# Patient Record
Sex: FEMALE | Race: WHITE | NOT HISPANIC OR LATINO | Employment: OTHER | URBAN - METROPOLITAN AREA
[De-identification: names, ages, dates, MRNs, and addresses within clinical notes are randomized per-mention and may not be internally consistent; named-entity substitution may affect disease eponyms.]

---

## 2018-01-19 ENCOUNTER — OFFICE VISIT CONVERTED (OUTPATIENT)
Dept: SURGERY | Facility: CLINIC | Age: 68
End: 2018-01-19
Attending: UROLOGY

## 2018-02-23 ENCOUNTER — OFFICE VISIT CONVERTED (OUTPATIENT)
Dept: SURGERY | Facility: CLINIC | Age: 68
End: 2018-02-23
Attending: UROLOGY

## 2018-02-23 ENCOUNTER — CONVERSION ENCOUNTER (OUTPATIENT)
Dept: SURGERY | Facility: CLINIC | Age: 68
End: 2018-02-23

## 2018-06-07 ENCOUNTER — CONVERSION ENCOUNTER (OUTPATIENT)
Dept: GASTROENTEROLOGY | Facility: CLINIC | Age: 68
End: 2018-06-07

## 2018-06-07 ENCOUNTER — OFFICE VISIT CONVERTED (OUTPATIENT)
Dept: GASTROENTEROLOGY | Facility: CLINIC | Age: 68
End: 2018-06-07
Attending: NURSE PRACTITIONER

## 2018-08-06 ENCOUNTER — OFFICE VISIT CONVERTED (OUTPATIENT)
Dept: OTHER | Facility: HOSPITAL | Age: 68
End: 2018-08-06
Attending: INTERNAL MEDICINE

## 2018-10-08 ENCOUNTER — OFFICE VISIT CONVERTED (OUTPATIENT)
Dept: GASTROENTEROLOGY | Facility: CLINIC | Age: 68
End: 2018-10-08
Attending: NURSE PRACTITIONER

## 2018-12-11 ENCOUNTER — OFFICE VISIT CONVERTED (OUTPATIENT)
Dept: GASTROENTEROLOGY | Facility: CLINIC | Age: 68
End: 2018-12-11
Attending: NURSE PRACTITIONER

## 2019-03-26 ENCOUNTER — CONVERSION ENCOUNTER (OUTPATIENT)
Dept: SURGERY | Facility: CLINIC | Age: 69
End: 2019-03-26

## 2019-03-26 ENCOUNTER — HOSPITAL ENCOUNTER (OUTPATIENT)
Dept: SURGERY | Facility: CLINIC | Age: 69
Discharge: HOME | End: 2019-03-26

## 2019-03-26 ENCOUNTER — OFFICE VISIT CONVERTED (OUTPATIENT)
Dept: SURGERY | Facility: CLINIC | Age: 69
End: 2019-03-26
Attending: UROLOGY

## 2019-04-05 LAB
COLOR STONE: NORMAL
COMPN STONE: NORMAL
CONV CA OXALATE DIHYDRATE: 20 %
CONV CA OXALATE MONOHYDRATE: 60 %
CONV CALCIUM PHOSPHATE: 20 %
CONV CALCULI COMMENT: NORMAL
CONV CALCULI DISCLAIMER: NORMAL
CONV CALCULI NOTE: NORMAL
NIDUS STONE QL: NORMAL
SIZE STONE: NORMAL MM
SURFACE CRYSTALS: NORMAL
WT STONE: 23 MG

## 2019-04-08 ENCOUNTER — HOSPITAL ENCOUNTER (OUTPATIENT)
Dept: LAB | Facility: HOSPITAL | Age: 69
Discharge: HOME | End: 2019-04-08
Attending: PHYSICIAN ASSISTANT

## 2019-04-08 LAB
ALBUMIN SERPL-MCNC: 4 G/DL (ref 3.5–5)
ALBUMIN/GLOB SERPL: 1.3 {RATIO} (ref 1.4–2.6)
ALP SERPL-CCNC: 105 U/L (ref 43–160)
ALT SERPL-CCNC: 16 U/L (ref 10–40)
ANION GAP SERPL CALC-SCNC: 15 MMOL/L (ref 8–19)
AST SERPL-CCNC: 21 U/L (ref 15–50)
BASOPHILS # BLD AUTO: 0.03 10*3/UL (ref 0–0.2)
BASOPHILS NFR BLD AUTO: 0.6 % (ref 0–3)
BILIRUB SERPL-MCNC: 0.39 MG/DL (ref 0.2–1.3)
BUN SERPL-MCNC: 7 MG/DL (ref 5–25)
BUN/CREAT SERPL: 8 {RATIO} (ref 6–20)
CALCIUM SERPL-MCNC: 9.4 MG/DL (ref 8.7–10.4)
CHLORIDE SERPL-SCNC: 103 MMOL/L (ref 99–111)
CHOLEST SERPL-MCNC: 248 MG/DL (ref 107–200)
CHOLEST/HDLC SERPL: 6.5 {RATIO} (ref 3–6)
CONV ABS IMM GRAN: 0.02 10*3/UL (ref 0–0.2)
CONV CO2: 27 MMOL/L (ref 22–32)
CONV IMMATURE GRAN: 0.4 % (ref 0–1.8)
CONV TOTAL PROTEIN: 7.2 G/DL (ref 6.3–8.2)
CREAT UR-MCNC: 0.93 MG/DL (ref 0.5–0.9)
DEPRECATED RDW RBC AUTO: 50.4 FL (ref 36.4–46.3)
EOSINOPHIL # BLD AUTO: 0.24 10*3/UL (ref 0–0.7)
EOSINOPHIL # BLD AUTO: 4.5 % (ref 0–7)
ERYTHROCYTE [DISTWIDTH] IN BLOOD BY AUTOMATED COUNT: 13.5 % (ref 11.7–14.4)
GFR SERPLBLD BASED ON 1.73 SQ M-ARVRAT: >60 ML/MIN/{1.73_M2}
GLOBULIN UR ELPH-MCNC: 3.2 G/DL (ref 2–3.5)
GLUCOSE SERPL-MCNC: 100 MG/DL (ref 65–99)
HBA1C MFR BLD: 16.1 G/DL (ref 12–16)
HCT VFR BLD AUTO: 47.9 % (ref 37–47)
HDLC SERPL-MCNC: 38 MG/DL (ref 40–60)
LDLC SERPL CALC-MCNC: 161 MG/DL (ref 70–100)
LYMPHOCYTES # BLD AUTO: 1.35 10*3/UL (ref 1–5)
MCH RBC QN AUTO: 33.9 PG (ref 27–31)
MCHC RBC AUTO-ENTMCNC: 33.6 G/DL (ref 33–37)
MCV RBC AUTO: 100.8 FL (ref 81–99)
MONOCYTES # BLD AUTO: 0.32 10*3/UL (ref 0.2–1.2)
MONOCYTES NFR BLD AUTO: 5.9 % (ref 3–10)
NEUTROPHILS # BLD AUTO: 3.42 10*3/UL (ref 2–8)
NEUTROPHILS NFR BLD AUTO: 63.5 % (ref 30–85)
NRBC CBCN: 0 % (ref 0–0.7)
OSMOLALITY SERPL CALC.SUM OF ELEC: 290 MOSM/KG (ref 273–304)
PLATELET # BLD AUTO: 192 10*3/UL (ref 130–400)
PMV BLD AUTO: 10.5 FL (ref 9.4–12.3)
POTASSIUM SERPL-SCNC: 3.9 MMOL/L (ref 3.5–5.3)
RBC # BLD AUTO: 4.75 10*6/UL (ref 4.2–5.4)
SODIUM SERPL-SCNC: 141 MMOL/L (ref 135–147)
T4 FREE SERPL-MCNC: 1.3 NG/DL (ref 0.9–1.8)
TRIGL SERPL-MCNC: 244 MG/DL (ref 40–150)
TSH SERPL-ACNC: 2.36 M[IU]/L (ref 0.27–4.2)
VARIANT LYMPHS NFR BLD MANUAL: 25.1 % (ref 20–45)
VLDLC SERPL-MCNC: 49 MG/DL (ref 5–37)
WBC # BLD AUTO: 5.38 10*3/UL (ref 4.8–10.8)

## 2019-04-09 ENCOUNTER — OFFICE VISIT CONVERTED (OUTPATIENT)
Dept: GASTROENTEROLOGY | Facility: CLINIC | Age: 69
End: 2019-04-09
Attending: NURSE PRACTITIONER

## 2019-04-09 ENCOUNTER — CONVERSION ENCOUNTER (OUTPATIENT)
Dept: GASTROENTEROLOGY | Facility: CLINIC | Age: 69
End: 2019-04-09

## 2019-04-09 LAB — 25(OH)D3 SERPL-MCNC: 55.4 NG/ML (ref 30–100)

## 2019-04-24 ENCOUNTER — HOSPITAL ENCOUNTER (OUTPATIENT)
Dept: CARDIOLOGY | Facility: HOSPITAL | Age: 69
Discharge: HOME | End: 2019-04-24
Attending: PHYSICIAN ASSISTANT

## 2019-07-30 ENCOUNTER — HOSPITAL ENCOUNTER (OUTPATIENT)
Dept: GENERAL RADIOLOGY | Facility: HOSPITAL | Age: 69
Discharge: HOME | End: 2019-07-30
Attending: INTERNAL MEDICINE

## 2019-07-30 ENCOUNTER — HOSPITAL ENCOUNTER (OUTPATIENT)
Dept: OTHER | Facility: HOSPITAL | Age: 69
Discharge: HOME | End: 2019-07-30
Attending: INTERNAL MEDICINE

## 2019-07-30 LAB
BASOPHILS # BLD AUTO: 0.02 10*3/UL (ref 0–0.2)
BASOPHILS NFR BLD AUTO: 0.3 % (ref 0–3)
CONV ABS IMM GRAN: 0.01 10*3/UL (ref 0–0.2)
CONV IMMATURE GRAN: 0.2 % (ref 0–1.8)
DEPRECATED RDW RBC AUTO: 48.4 FL (ref 36.4–46.3)
EOSINOPHIL # BLD AUTO: 0.24 10*3/UL (ref 0–0.7)
EOSINOPHIL # BLD AUTO: 4.2 % (ref 0–7)
ERYTHROCYTE [DISTWIDTH] IN BLOOD BY AUTOMATED COUNT: 12.9 % (ref 11.7–14.4)
HBA1C MFR BLD: 15.4 G/DL (ref 12–16)
HCT VFR BLD AUTO: 45.8 % (ref 37–47)
LYMPHOCYTES # BLD AUTO: 1.37 10*3/UL (ref 1–5)
MCH RBC QN AUTO: 33.9 PG (ref 27–31)
MCHC RBC AUTO-ENTMCNC: 33.6 G/DL (ref 33–37)
MCV RBC AUTO: 100.9 FL (ref 81–99)
MONOCYTES # BLD AUTO: 0.41 10*3/UL (ref 0.2–1.2)
MONOCYTES NFR BLD AUTO: 7.2 % (ref 3–10)
NEUTROPHILS # BLD AUTO: 3.67 10*3/UL (ref 2–8)
NEUTROPHILS NFR BLD AUTO: 64.1 % (ref 30–85)
NRBC CBCN: 0 % (ref 0–0.7)
PLATELET # BLD AUTO: 161 10*3/UL (ref 130–400)
PMV BLD AUTO: 9.8 FL (ref 9.4–12.3)
RBC # BLD AUTO: 4.54 10*6/UL (ref 4.2–5.4)
VARIANT LYMPHS NFR BLD MANUAL: 24 % (ref 20–45)
WBC # BLD AUTO: 5.72 10*3/UL (ref 4.8–10.8)

## 2019-08-05 ENCOUNTER — OFFICE VISIT CONVERTED (OUTPATIENT)
Dept: OTHER | Facility: HOSPITAL | Age: 69
End: 2019-08-05
Attending: INTERNAL MEDICINE

## 2019-10-15 ENCOUNTER — HOSPITAL ENCOUNTER (OUTPATIENT)
Dept: LAB | Facility: HOSPITAL | Age: 69
Discharge: HOME | End: 2019-10-15
Attending: PHYSICIAN ASSISTANT

## 2019-10-15 LAB
ALBUMIN SERPL-MCNC: 4.5 G/DL (ref 3.5–5)
ALBUMIN/GLOB SERPL: 1.3 {RATIO} (ref 1.4–2.6)
ALP SERPL-CCNC: 97 U/L (ref 43–160)
ALT SERPL-CCNC: 18 U/L (ref 10–40)
ANION GAP SERPL CALC-SCNC: 18 MMOL/L (ref 8–19)
AST SERPL-CCNC: 24 U/L (ref 15–50)
BASOPHILS # BLD AUTO: 0.04 10*3/UL (ref 0–0.2)
BASOPHILS NFR BLD AUTO: 0.6 % (ref 0–3)
BILIRUB SERPL-MCNC: 0.4 MG/DL (ref 0.2–1.3)
BUN SERPL-MCNC: 14 MG/DL (ref 5–25)
BUN/CREAT SERPL: 14 {RATIO} (ref 6–20)
CALCIUM SERPL-MCNC: 10.4 MG/DL (ref 8.7–10.4)
CHLORIDE SERPL-SCNC: 99 MMOL/L (ref 99–111)
CHOLEST SERPL-MCNC: 257 MG/DL (ref 107–200)
CHOLEST/HDLC SERPL: 5.7 {RATIO} (ref 3–6)
CONV ABS IMM GRAN: 0.01 10*3/UL (ref 0–0.2)
CONV CO2: 24 MMOL/L (ref 22–32)
CONV IMMATURE GRAN: 0.2 % (ref 0–1.8)
CONV TOTAL PROTEIN: 7.9 G/DL (ref 6.3–8.2)
CREAT UR-MCNC: 0.98 MG/DL (ref 0.5–0.9)
DEPRECATED RDW RBC AUTO: 48.6 FL (ref 36.4–46.3)
EOSINOPHIL # BLD AUTO: 0.24 10*3/UL (ref 0–0.7)
EOSINOPHIL # BLD AUTO: 3.8 % (ref 0–7)
ERYTHROCYTE [DISTWIDTH] IN BLOOD BY AUTOMATED COUNT: 13 % (ref 11.7–14.4)
FOLATE SERPL-MCNC: 11.7 NG/ML (ref 4.8–20)
GFR SERPLBLD BASED ON 1.73 SQ M-ARVRAT: 59 ML/MIN/{1.73_M2}
GLOBULIN UR ELPH-MCNC: 3.4 G/DL (ref 2–3.5)
GLUCOSE SERPL-MCNC: 95 MG/DL (ref 65–99)
HCT VFR BLD AUTO: 47.8 % (ref 37–47)
HDLC SERPL-MCNC: 45 MG/DL (ref 40–60)
HGB BLD-MCNC: 16.3 G/DL (ref 12–16)
LDLC SERPL CALC-MCNC: 182 MG/DL (ref 70–100)
LYMPHOCYTES # BLD AUTO: 1.88 10*3/UL (ref 1–5)
LYMPHOCYTES NFR BLD AUTO: 29.5 % (ref 20–45)
MCH RBC QN AUTO: 34.2 PG (ref 27–31)
MCHC RBC AUTO-ENTMCNC: 34.1 G/DL (ref 33–37)
MCV RBC AUTO: 100.2 FL (ref 81–99)
MONOCYTES # BLD AUTO: 0.41 10*3/UL (ref 0.2–1.2)
MONOCYTES NFR BLD AUTO: 6.4 % (ref 3–10)
NEUTROPHILS # BLD AUTO: 3.79 10*3/UL (ref 2–8)
NEUTROPHILS NFR BLD AUTO: 59.5 % (ref 30–85)
NRBC CBCN: 0 % (ref 0–0.7)
OSMOLALITY SERPL CALC.SUM OF ELEC: 284 MOSM/KG (ref 273–304)
PLATELET # BLD AUTO: 181 10*3/UL (ref 130–400)
PMV BLD AUTO: 10.9 FL (ref 9.4–12.3)
POTASSIUM SERPL-SCNC: 4.3 MMOL/L (ref 3.5–5.3)
RBC # BLD AUTO: 4.77 10*6/UL (ref 4.2–5.4)
SODIUM SERPL-SCNC: 137 MMOL/L (ref 135–147)
T4 FREE SERPL-MCNC: 1.2 NG/DL (ref 0.9–1.8)
TRIGL SERPL-MCNC: 152 MG/DL (ref 40–150)
TSH SERPL-ACNC: 1.97 M[IU]/L (ref 0.27–4.2)
VIT B12 SERPL-MCNC: 1093 PG/ML (ref 211–911)
VLDLC SERPL-MCNC: 30 MG/DL (ref 5–37)
WBC # BLD AUTO: 6.37 10*3/UL (ref 4.8–10.8)

## 2019-10-16 LAB — 25(OH)D3 SERPL-MCNC: 84.7 NG/ML (ref 30–100)

## 2019-11-25 ENCOUNTER — HOSPITAL ENCOUNTER (OUTPATIENT)
Dept: OTHER | Facility: HOSPITAL | Age: 69
Discharge: HOME | End: 2019-11-25
Attending: INTERNAL MEDICINE

## 2019-11-25 LAB
BASOPHILS # BLD AUTO: 0.04 10*3/UL (ref 0–0.2)
BASOPHILS NFR BLD AUTO: 0.7 % (ref 0–3)
CHOLEST SERPL-MCNC: 214 MG/DL (ref 107–200)
CHOLEST/HDLC SERPL: 5.6 {RATIO} (ref 3–6)
CONV ABS IMM GRAN: 0.02 10*3/UL (ref 0–0.2)
CONV IMMATURE GRAN: 0.3 % (ref 0–1.8)
DEPRECATED RDW RBC AUTO: 48.9 FL (ref 36.4–46.3)
EOSINOPHIL # BLD AUTO: 0.47 10*3/UL (ref 0–0.7)
EOSINOPHIL # BLD AUTO: 7.8 % (ref 0–7)
ERYTHROCYTE [DISTWIDTH] IN BLOOD BY AUTOMATED COUNT: 13 % (ref 11.7–14.4)
HCT VFR BLD AUTO: 48 % (ref 37–47)
HCYS SERPL-SCNC: 13.8 UMOL/L (ref 3.7–13.9)
HDLC SERPL-MCNC: 38 MG/DL (ref 40–60)
HGB BLD-MCNC: 16 G/DL (ref 12–16)
IRON SATN MFR SERPL: 26 % (ref 20–55)
IRON SERPL-MCNC: 92 UG/DL (ref 60–170)
LDLC SERPL CALC-MCNC: 124 MG/DL (ref 70–100)
LYMPHOCYTES # BLD AUTO: 1.99 10*3/UL (ref 1–5)
LYMPHOCYTES NFR BLD AUTO: 32.8 % (ref 20–45)
MCH RBC QN AUTO: 33.8 PG (ref 27–31)
MCHC RBC AUTO-ENTMCNC: 33.3 G/DL (ref 33–37)
MCV RBC AUTO: 101.3 FL (ref 81–99)
MONOCYTES # BLD AUTO: 0.6 10*3/UL (ref 0.2–1.2)
MONOCYTES NFR BLD AUTO: 9.9 % (ref 3–10)
NEUTROPHILS # BLD AUTO: 2.94 10*3/UL (ref 2–8)
NEUTROPHILS NFR BLD AUTO: 48.5 % (ref 30–85)
NRBC CBCN: 0 % (ref 0–0.7)
PLATELET # BLD AUTO: 177 10*3/UL (ref 130–400)
PMV BLD AUTO: 10.1 FL (ref 9.4–12.3)
RBC # BLD AUTO: 4.74 10*6/UL (ref 4.2–5.4)
TIBC SERPL-MCNC: 360 UG/DL (ref 245–450)
TRANSFERRIN SERPL-MCNC: 252 MG/DL (ref 250–380)
TRIGL SERPL-MCNC: 262 MG/DL (ref 40–150)
VLDLC SERPL-MCNC: 52 MG/DL (ref 5–37)
WBC # BLD AUTO: 6.06 10*3/UL (ref 4.8–10.8)

## 2019-12-04 ENCOUNTER — OFFICE VISIT CONVERTED (OUTPATIENT)
Dept: OTHER | Facility: HOSPITAL | Age: 69
End: 2019-12-04
Attending: INTERNAL MEDICINE

## 2020-04-14 ENCOUNTER — HOSPITAL ENCOUNTER (OUTPATIENT)
Dept: OTHER | Facility: HOSPITAL | Age: 70
Discharge: HOME | End: 2020-04-14
Attending: INTERNAL MEDICINE

## 2020-04-14 ENCOUNTER — CONVERSION ENCOUNTER (OUTPATIENT)
Dept: OTHER | Facility: HOSPITAL | Age: 70
End: 2020-04-14

## 2020-04-14 ENCOUNTER — OFFICE VISIT CONVERTED (OUTPATIENT)
Dept: OTHER | Facility: HOSPITAL | Age: 70
End: 2020-04-14
Attending: INTERNAL MEDICINE

## 2020-04-14 ENCOUNTER — OFFICE VISIT CONVERTED (OUTPATIENT)
Dept: OTHER | Facility: HOSPITAL | Age: 70
End: 2020-04-14
Attending: PHYSICIAN ASSISTANT

## 2020-04-14 LAB
BASOPHILS # BLD AUTO: 0.03 10*3/UL (ref 0–0.2)
BASOPHILS NFR BLD AUTO: 0.4 % (ref 0–3)
CONV ABS IMM GRAN: 0.02 10*3/UL (ref 0–0.2)
CONV IMMATURE GRAN: 0.3 % (ref 0–1.8)
DEPRECATED RDW RBC AUTO: 50 FL (ref 36.4–46.3)
EOSINOPHIL # BLD AUTO: 0.27 10*3/UL (ref 0–0.7)
EOSINOPHIL # BLD AUTO: 3.6 % (ref 0–7)
ERYTHROCYTE [DISTWIDTH] IN BLOOD BY AUTOMATED COUNT: 13.1 % (ref 11.7–14.4)
HCT VFR BLD AUTO: 50.4 % (ref 37–47)
HGB BLD-MCNC: 16.9 G/DL (ref 12–16)
LYMPHOCYTES # BLD AUTO: 1.78 10*3/UL (ref 1–5)
LYMPHOCYTES NFR BLD AUTO: 23.8 % (ref 20–45)
MCH RBC QN AUTO: 34.3 PG (ref 27–31)
MCHC RBC AUTO-ENTMCNC: 33.5 G/DL (ref 33–37)
MCV RBC AUTO: 102.4 FL (ref 81–99)
MONOCYTES # BLD AUTO: 0.5 10*3/UL (ref 0.2–1.2)
MONOCYTES NFR BLD AUTO: 6.7 % (ref 3–10)
NEUTROPHILS # BLD AUTO: 4.88 10*3/UL (ref 2–8)
NEUTROPHILS NFR BLD AUTO: 65.2 % (ref 30–85)
NRBC CBCN: 0 % (ref 0–0.7)
PLATELET # BLD AUTO: 193 10*3/UL (ref 130–400)
PMV BLD AUTO: 10.4 FL (ref 9.4–12.3)
RBC # BLD AUTO: 4.92 10*6/UL (ref 4.2–5.4)
WBC # BLD AUTO: 7.48 10*3/UL (ref 4.8–10.8)

## 2020-04-15 LAB — SARS-COV-2 RNA SPEC QL NAA+PROBE: NOT DETECTED

## 2020-11-23 ENCOUNTER — HOSPITAL ENCOUNTER (OUTPATIENT)
Dept: LAB | Facility: HOSPITAL | Age: 70
Discharge: HOME | End: 2020-11-23
Attending: PHYSICIAN ASSISTANT

## 2020-11-23 LAB
25(OH)D3 SERPL-MCNC: 84.7 NG/ML (ref 30–100)
ALBUMIN SERPL-MCNC: 4.2 G/DL (ref 3.5–5)
ALBUMIN/GLOB SERPL: 1.3 {RATIO} (ref 1.4–2.6)
ALP SERPL-CCNC: 94 U/L (ref 43–160)
ALT SERPL-CCNC: 12 U/L (ref 10–40)
ANION GAP SERPL CALC-SCNC: 15 MMOL/L (ref 8–19)
AST SERPL-CCNC: 16 U/L (ref 15–50)
BASOPHILS # BLD AUTO: 0.02 10*3/UL (ref 0–0.2)
BASOPHILS NFR BLD AUTO: 0.3 % (ref 0–3)
BILIRUB SERPL-MCNC: 0.33 MG/DL (ref 0.2–1.3)
BUN SERPL-MCNC: 9 MG/DL (ref 5–25)
BUN/CREAT SERPL: 9 {RATIO} (ref 6–20)
CALCIUM SERPL-MCNC: 9.6 MG/DL (ref 8.7–10.4)
CHLORIDE SERPL-SCNC: 103 MMOL/L (ref 99–111)
CHOLEST SERPL-MCNC: 196 MG/DL (ref 107–200)
CHOLEST/HDLC SERPL: 5.6 {RATIO} (ref 3–6)
CONV ABS IMM GRAN: 0.01 10*3/UL (ref 0–0.2)
CONV CO2: 26 MMOL/L (ref 22–32)
CONV IMMATURE GRAN: 0.2 % (ref 0–1.8)
CONV TOTAL PROTEIN: 7.5 G/DL (ref 6.3–8.2)
CREAT UR-MCNC: 0.98 MG/DL (ref 0.5–0.9)
DEPRECATED RDW RBC AUTO: 48.5 FL (ref 36.4–46.3)
EOSINOPHIL # BLD AUTO: 0.22 10*3/UL (ref 0–0.7)
EOSINOPHIL # BLD AUTO: 3.6 % (ref 0–7)
ERYTHROCYTE [DISTWIDTH] IN BLOOD BY AUTOMATED COUNT: 13.2 % (ref 11.7–14.4)
GFR SERPLBLD BASED ON 1.73 SQ M-ARVRAT: 58 ML/MIN/{1.73_M2}
GLOBULIN UR ELPH-MCNC: 3.3 G/DL (ref 2–3.5)
GLUCOSE SERPL-MCNC: 85 MG/DL (ref 65–99)
HCT VFR BLD AUTO: 49.2 % (ref 37–47)
HDLC SERPL-MCNC: 35 MG/DL (ref 40–60)
HGB BLD-MCNC: 16.5 G/DL (ref 12–16)
IRON SERPL-MCNC: 105 UG/DL (ref 60–170)
LDLC SERPL CALC-MCNC: 105 MG/DL (ref 70–100)
LYMPHOCYTES # BLD AUTO: 1.76 10*3/UL (ref 1–5)
LYMPHOCYTES NFR BLD AUTO: 29 % (ref 20–45)
MCH RBC QN AUTO: 33.4 PG (ref 27–31)
MCHC RBC AUTO-ENTMCNC: 33.5 G/DL (ref 33–37)
MCV RBC AUTO: 99.6 FL (ref 81–99)
MONOCYTES # BLD AUTO: 0.37 10*3/UL (ref 0.2–1.2)
MONOCYTES NFR BLD AUTO: 6.1 % (ref 3–10)
NEUTROPHILS # BLD AUTO: 3.69 10*3/UL (ref 2–8)
NEUTROPHILS NFR BLD AUTO: 60.8 % (ref 30–85)
NRBC CBCN: 0 % (ref 0–0.7)
OSMOLALITY SERPL CALC.SUM OF ELEC: 288 MOSM/KG (ref 273–304)
PLATELET # BLD AUTO: 190 10*3/UL (ref 130–400)
PMV BLD AUTO: 10.3 FL (ref 9.4–12.3)
POTASSIUM SERPL-SCNC: 3.7 MMOL/L (ref 3.5–5.3)
RBC # BLD AUTO: 4.94 10*6/UL (ref 4.2–5.4)
SODIUM SERPL-SCNC: 140 MMOL/L (ref 135–147)
T4 FREE SERPL-MCNC: 1.1 NG/DL (ref 0.9–1.8)
TRIGL SERPL-MCNC: 279 MG/DL (ref 40–150)
TSH SERPL-ACNC: 2.97 M[IU]/L (ref 0.27–4.2)
VIT B12 SERPL-MCNC: 1263 PG/ML (ref 211–911)
VLDLC SERPL-MCNC: 56 MG/DL (ref 5–37)
WBC # BLD AUTO: 6.07 10*3/UL (ref 4.8–10.8)

## 2020-11-23 PROCEDURE — 82746 ASSAY OF FOLIC ACID SERUM: CPT

## 2020-11-26 ENCOUNTER — LAB REQUISITION (OUTPATIENT)
Dept: LAB | Facility: HOSPITAL | Age: 70
End: 2020-11-26

## 2020-11-26 DIAGNOSIS — Z00.00 ROUTINE GENERAL MEDICAL EXAMINATION AT A HEALTH CARE FACILITY: ICD-10-CM

## 2020-11-26 LAB — FOLATE SERPL-MCNC: 6.94 NG/ML (ref 4.78–24.2)

## 2020-12-22 ENCOUNTER — OFFICE VISIT CONVERTED (OUTPATIENT)
Dept: ONCOLOGY | Facility: HOSPITAL | Age: 70
End: 2020-12-22
Attending: INTERNAL MEDICINE

## 2020-12-22 ENCOUNTER — HOSPITAL ENCOUNTER (OUTPATIENT)
Dept: OTHER | Facility: HOSPITAL | Age: 70
Discharge: HOME | End: 2020-12-22
Attending: INTERNAL MEDICINE

## 2020-12-28 ENCOUNTER — HOSPITAL ENCOUNTER (OUTPATIENT)
Dept: GENERAL RADIOLOGY | Facility: HOSPITAL | Age: 70
Discharge: HOME | End: 2020-12-28
Attending: PHYSICIAN ASSISTANT

## 2021-02-24 ENCOUNTER — HOSPITAL ENCOUNTER (OUTPATIENT)
Dept: LAB | Facility: HOSPITAL | Age: 71
Discharge: HOME | End: 2021-02-24
Attending: PHYSICIAN ASSISTANT

## 2021-02-24 LAB
ALBUMIN SERPL-MCNC: 4.1 G/DL (ref 3.5–5)
ALBUMIN/GLOB SERPL: 1.3 {RATIO} (ref 1.4–2.6)
ALP SERPL-CCNC: 83 U/L (ref 43–160)
ALT SERPL-CCNC: 19 U/L (ref 10–40)
ANION GAP SERPL CALC-SCNC: 15 MMOL/L (ref 8–19)
AST SERPL-CCNC: 23 U/L (ref 15–50)
BASOPHILS # BLD AUTO: 0.03 10*3/UL (ref 0–0.2)
BASOPHILS NFR BLD AUTO: 0.6 % (ref 0–3)
BILIRUB SERPL-MCNC: 0.3 MG/DL (ref 0.2–1.3)
BUN SERPL-MCNC: 11 MG/DL (ref 5–25)
BUN/CREAT SERPL: 11 {RATIO} (ref 6–20)
CALCIUM SERPL-MCNC: 9.8 MG/DL (ref 8.7–10.4)
CHLORIDE SERPL-SCNC: 105 MMOL/L (ref 99–111)
CHOLEST SERPL-MCNC: 208 MG/DL (ref 107–200)
CHOLEST/HDLC SERPL: 5.2 {RATIO} (ref 3–6)
CONV ABS IMM GRAN: 0.01 10*3/UL (ref 0–0.2)
CONV CO2: 24 MMOL/L (ref 22–32)
CONV IMMATURE GRAN: 0.2 % (ref 0–1.8)
CONV TOTAL PROTEIN: 7.2 G/DL (ref 6.3–8.2)
CREAT UR-MCNC: 0.96 MG/DL (ref 0.5–0.9)
DEPRECATED RDW RBC AUTO: 48.4 FL (ref 36.4–46.3)
EOSINOPHIL # BLD AUTO: 0.32 10*3/UL (ref 0–0.7)
EOSINOPHIL # BLD AUTO: 5.9 % (ref 0–7)
ERYTHROCYTE [DISTWIDTH] IN BLOOD BY AUTOMATED COUNT: 13 % (ref 11.7–14.4)
GFR SERPLBLD BASED ON 1.73 SQ M-ARVRAT: 59 ML/MIN/{1.73_M2}
GLOBULIN UR ELPH-MCNC: 3.1 G/DL (ref 2–3.5)
GLUCOSE SERPL-MCNC: 77 MG/DL (ref 65–99)
HCT VFR BLD AUTO: 46.5 % (ref 37–47)
HDLC SERPL-MCNC: 40 MG/DL (ref 40–60)
HGB BLD-MCNC: 15.8 G/DL (ref 12–16)
LDLC SERPL CALC-MCNC: 133 MG/DL (ref 70–100)
LYMPHOCYTES # BLD AUTO: 1.33 10*3/UL (ref 1–5)
LYMPHOCYTES NFR BLD AUTO: 24.4 % (ref 20–45)
MCH RBC QN AUTO: 34.1 PG (ref 27–31)
MCHC RBC AUTO-ENTMCNC: 34 G/DL (ref 33–37)
MCV RBC AUTO: 100.4 FL (ref 81–99)
MONOCYTES # BLD AUTO: 0.48 10*3/UL (ref 0.2–1.2)
MONOCYTES NFR BLD AUTO: 8.8 % (ref 3–10)
NEUTROPHILS # BLD AUTO: 3.28 10*3/UL (ref 2–8)
NEUTROPHILS NFR BLD AUTO: 60.1 % (ref 30–85)
NRBC CBCN: 0 % (ref 0–0.7)
OSMOLALITY SERPL CALC.SUM OF ELEC: 288 MOSM/KG (ref 273–304)
PLATELET # BLD AUTO: 191 10*3/UL (ref 130–400)
PMV BLD AUTO: 10.6 FL (ref 9.4–12.3)
POTASSIUM SERPL-SCNC: 4 MMOL/L (ref 3.5–5.3)
RBC # BLD AUTO: 4.63 10*6/UL (ref 4.2–5.4)
SODIUM SERPL-SCNC: 140 MMOL/L (ref 135–147)
TRIGL SERPL-MCNC: 175 MG/DL (ref 40–150)
VLDLC SERPL-MCNC: 35 MG/DL (ref 5–37)
WBC # BLD AUTO: 5.45 10*3/UL (ref 4.8–10.8)

## 2021-05-10 NOTE — H&P
History and Physical      Patient Name: Samira Kramer   Patient ID: [id number]   Sex: Female   YOB: 1950    Primary Care Provider: Alysa Atkins PA-C   Referring Provider: Alysa Atkins PA-C    Visit Date: April 14, 2020    Provider: Rose Manzano PA-C   Location[facility]   Location Address: [address] [place] [address]  [id number]   Location Phone: [phone]          Chief Complaint  · Evaluation for Respiratory Virus      History Of Present Illness  Samira Kramer is a 69 year old /White female who presents today to the clinic for evaluation of a respiratory virus.   Date of Onset: 02/24/2020   What Symptoms: cough   Quality of Symptoms: Presents today with a cough since the end of February. Symptoms dry nonproductive sometimes productive with yellow phlegm. She is also with subjective fevers at the beginning of this and has been occurring intermittently throughout. She is afebrile today upon presentation.   Anything make it worse or better: nothing   Severity of Symptoms: mild   Any known Exposure to COVID-19: no known exposure       Past Medical History  Acid reflux; Diarrhea; High cholesterol         Past Surgical History  Appendectomy; Colonoscopy; Tubal ligation         Medication List  garlic 1,000 mg oral capsule; Green Tea oral capsule; levothyroxine 25 mcg oral tablet; potassium 99 mg oral tablet; Probiotic 15 billion cell oral capsule; vitamin B12-folic acid 500-400 mcg oral tablet; Vitamin C 1,000 mg oral tablet; Vitamin D3 1,000 unit oral capsule         Allergy List  Aleve; PENICILLINS       Allergies Reconciled  Family Medical History  *No Known Family History         Social History  Alcohol (Unknown); Tobacco (Current every day)         Review of Systems  · Constitutional  o Denies  o : fatigue, fever, weight gain, weight loss  · Respiratory  o Admits  o : cough  o Denies  o :  asthma  · Gastrointestinal  o Denies  o : nausea, vomiting, diarrhea, constipation, abdominal pain  · Integument  o Denies  o : rash  · Neurologic  o Denies  o : headache      Vitals  Date Time BP Position Site L\R Cuff Size HR RR TEMP (F) WT  HT  BMI kg/m2 BSA m2 O2 Sat HC       04/14/2020 01:05 PM      84 - R  97.7     99 %          Physical Examination  · Constitutional  o Appearance  o : no acute distress, well-nourished  · Head and Face  o Head  o :   § Inspection  § : atraumatic, normocephalic  · Eyes  o Eyes  o : extraocular movements intact, no scleral icterus, no conjunctival injection  · Ears, Nose, Mouth and Throat  o Ears  o :   § External Ears  § : normal  § Otoscopic Examination  § : normal tympanic membrane exam  o Nose  o :   § Intranasal Exam  § : sinuses nontender to palpation  o Oral Cavity  o :   § Oral Mucosa  § : moist mucous membranes  o Throat  o :   § Oropharynx  § : oropharynx inflammation present   · Respiratory  o Respiratory Effort  o : breathing comfortably, symmetric chest rise  o Auscultation of Lungs  o : clear to asculatation bilaterally, no wheezes, rales, or rhonchii  · Cardiovascular  o Heart  o :   § Auscultation of Heart  § : regular rate and rhythm, no murmurs, rubs, or gallops  o Peripheral Vascular System  o :   § Extremities  § : no edema  · Lymphatic  o Neck  o : no lymphadenopathy present  o Supraclavicular Nodes  o : no supraclavicular nodes  · Skin and Subcutaneous Tissue  o General Inspection  o : normal inspection  · Neurologic  o Mental Status Examination  o :   § Orientation  § : grossly oriented to person, place and time  o Gait and Station  o :   § Gait Screening  § : normal gait  · Psychiatric  o General  o : normal mood and affect          Results  · In-Office Procedures  o Lab procedure  § Rapid group A Streptococcus screen (30689)   § Beta Strep Gp A Culture: Negative   § Internal Control Verified?: Yes        Assessment  · Cough     786.2/R05  · Fever     780.60/R50.9  · Sore throat     462/J02.9  · Candidiasis of mouth     112.0/B37.0      Plan  · Orders  o Chest (AP PA and Lateral) 2 views X-Ray [facility] (45165) - 786.2/R05, 780.60/R50.9, 462/J02.9 - 04/14/2020  · Medications  o clindamycin HCl 300 mg oral capsule   SIG: take 1 capsule (300 mg) by oral route 2 times per day for 10 days   DISP: (20) capsules with 0 refills  Prescribed on 04/14/2020     o nystatin 100,000 unit/mL oral suspension   SIG: take 4 milliliters (400,000 unit) by oral route 4 times per day for 10 days   DISP: (160) milliliters with 0 refills  Prescribed on 04/14/2020     o Medications have been Reconciled  o Transition of Care or Provider Policy  · Instructions  o Plan of Care:   o Patient instructed to seek medical attention urgently for new or worsening symptoms.  o Patient was educated on their diagnosis, treatment, and medications today.   o Recommend self monitoring. Instructions given.   o Stay home until without fever for 72 hours without using fever-reducing medications and other symptoms are gone.  o Recommends over the counter medications for symptom management.  o Chest xray done. Covid swab performed. Strep test done as well. Patient will be treated for Strep. Patient will be treated for thrush. Patient will be notified of all labs.   · Disposition  o Call or Return if symptoms worsen or persist.  · Correspondence  o CC this document (Alysa Atkins PA-C, Mia Woods MD) - 04/14/2020            Electronically Signed by: Rose Manzano PA-C -Author on April 14, 2020 02:00:39 PM

## 2021-05-15 VITALS — WEIGHT: 130 LBS | RESPIRATION RATE: 12 BRPM | HEIGHT: 62 IN | BODY MASS INDEX: 23.92 KG/M2

## 2021-05-15 VITALS
WEIGHT: 131.5 LBS | DIASTOLIC BLOOD PRESSURE: 82 MMHG | SYSTOLIC BLOOD PRESSURE: 141 MMHG | BODY MASS INDEX: 24.2 KG/M2 | HEIGHT: 62 IN

## 2021-05-15 VITALS
WEIGHT: 131 LBS | DIASTOLIC BLOOD PRESSURE: 73 MMHG | BODY MASS INDEX: 24.11 KG/M2 | HEIGHT: 62 IN | SYSTOLIC BLOOD PRESSURE: 145 MMHG

## 2021-05-15 VITALS — HEART RATE: 84 BPM | TEMPERATURE: 97.7 F | OXYGEN SATURATION: 99 %

## 2021-05-16 VITALS
BODY MASS INDEX: 23.81 KG/M2 | SYSTOLIC BLOOD PRESSURE: 114 MMHG | HEIGHT: 62 IN | WEIGHT: 129.37 LBS | DIASTOLIC BLOOD PRESSURE: 74 MMHG

## 2021-05-16 VITALS
WEIGHT: 135 LBS | BODY MASS INDEX: 24.84 KG/M2 | HEIGHT: 62 IN | DIASTOLIC BLOOD PRESSURE: 68 MMHG | SYSTOLIC BLOOD PRESSURE: 120 MMHG

## 2021-05-16 VITALS
WEIGHT: 130.12 LBS | SYSTOLIC BLOOD PRESSURE: 122 MMHG | BODY MASS INDEX: 23.94 KG/M2 | HEIGHT: 62 IN | DIASTOLIC BLOOD PRESSURE: 70 MMHG

## 2021-05-16 VITALS — RESPIRATION RATE: 16 BRPM | HEIGHT: 62 IN | BODY MASS INDEX: 24.84 KG/M2 | WEIGHT: 135 LBS

## 2021-05-20 ENCOUNTER — HOSPITAL ENCOUNTER (OUTPATIENT)
Dept: CARDIOLOGY | Facility: HOSPITAL | Age: 71
Discharge: HOME | End: 2021-05-20
Attending: PHYSICIAN ASSISTANT

## 2021-05-28 VITALS
WEIGHT: 131.4 LBS | OXYGEN SATURATION: 95 % | SYSTOLIC BLOOD PRESSURE: 140 MMHG | HEART RATE: 79 BPM | TEMPERATURE: 98.3 F | OXYGEN SATURATION: 100 % | HEIGHT: 62 IN | WEIGHT: 131.4 LBS | RESPIRATION RATE: 20 BRPM | SYSTOLIC BLOOD PRESSURE: 117 MMHG | TEMPERATURE: 98.2 F | HEIGHT: 62 IN | OXYGEN SATURATION: 98 % | DIASTOLIC BLOOD PRESSURE: 78 MMHG | DIASTOLIC BLOOD PRESSURE: 69 MMHG | RESPIRATION RATE: 18 BRPM | BODY MASS INDEX: 23.46 KG/M2 | TEMPERATURE: 97.6 F | WEIGHT: 127.5 LBS | WEIGHT: 129.37 LBS | HEART RATE: 66 BPM | SYSTOLIC BLOOD PRESSURE: 112 MMHG | BODY MASS INDEX: 24.18 KG/M2 | BODY MASS INDEX: 24.18 KG/M2 | SYSTOLIC BLOOD PRESSURE: 143 MMHG | BODY MASS INDEX: 23.81 KG/M2 | RESPIRATION RATE: 12 BRPM | OXYGEN SATURATION: 99 % | DIASTOLIC BLOOD PRESSURE: 70 MMHG | HEART RATE: 72 BPM | HEIGHT: 62 IN | TEMPERATURE: 98.3 F | HEIGHT: 62 IN | HEART RATE: 73 BPM | DIASTOLIC BLOOD PRESSURE: 70 MMHG

## 2021-05-28 VITALS
RESPIRATION RATE: 18 BRPM | TEMPERATURE: 97.9 F | DIASTOLIC BLOOD PRESSURE: 80 MMHG | OXYGEN SATURATION: 99 % | HEART RATE: 83 BPM | SYSTOLIC BLOOD PRESSURE: 108 MMHG

## 2021-05-28 NOTE — PROGRESS NOTES
Patient: YAZMIN FARMER     Acct: [id number]     Report: #[id number]  UNIT #: [id number]     : 1950    Encounter Date:2020  PRIMARY CARE: Alysa Atkins  ***Signed***  --------------------------------------------------------------------------------------------------------------------  NURSE INTAKE      Visit Type      New Patient Visit            Chief Complaint      ELEVATED HGB            Referring Provider/Copies To      Primary Care Provider:  Alysa Atkins      Copies To:   Alysa Atkins            History and Present Illness      HPI - Hematology Interim      Hematology/oncology problem list:      ------------------------------------------------            1.  History of secondary erythrocytosis: Current hemoglobin ranges between     16-16.5            Mrs. Swain will return to the cancer blood center today for her annual follow-    up evaluation to review her CBC.  Her hemoglobin is 16.5 unchanged from her     prior values.  She continues to smoke about 1 pack of cigarettes a day.  She     denies any new symptoms.  She denies any shortness of breath, pruritus, bleeding    or clotting in the interim.            PAST, FAMILY   Past Medical History      Past Medical History:  High Cholesterol, Thyroid Disease      Other PMH:        ELEVATED HGB            Past Surgical History      Appendectomy, Cataract Surgery            Family History      Family History:  No Family History            Social History      Marital Status:        Lives independently:  Yes      Number of Children:  0            Tobacco Use      Tobacco status:  Current every day smoker      Smoking packs/day:  1            Alcohol Use      Alcohol intake:  None            Substance Use      Substance use:  Denies use            REVIEW OF SYSTEMS      General:  Denies: Appetite Change, Fatigue, Fever, Night Sweats, Weight Gain,     Weight Loss      Eye:  Denies Blurred Vision, Denies Corrective Lenses,  Denies Diplopia, Denies     Vision Changes      ENT:  Denies Headache, Denies Hearing Loss, Denies Hoarseness, Denies Sore     Throat      Cardiovascular:  Denies Chest Pain, Denies Palpitations      Respiratory:  Denies: Cough, Coughing Blood, Productive Cough, Shortness of Air,    Wheezing      Gastrointestinal:  Denies Bloody Stools, Denies Constipation, Denies Diarrhea,     Denies Nausea/Vomiting, Denies Problem Swallowing, Denies Unable to Control     Bowels      Genitourinary:  Denies Blood in Urine, Denies Incontinence, Denies Painful     Urination      Musculoskeletal:  Denies Back Pain, Denies Muscle Pain, Denies Painful Joints      Integumentary:  Denies Itching, Denies Lesions, Denies Rash      Neurologic:  Denies Dizziness, Denies Numbness\Tingling, Denies Seizures      Psychiatric:  Denies Anxiety, Denies Depression      Endocrine:  Denies Cold Intolerance, Denies Heat Intolerance      Hematologic/Lymphatic:  Denies Bruising, Denies Bleeding, Denies Enlarged Lymph     Nodes      Reproductive:  Denies: Menopause, Heavy Periods, Pregnant, Still Menstruating            VITAL SIGNS AND SCORES      Vitals      Temperature 97.9 F / 36.61 C - Temporal      Pulse 83      Respirations 18      Blood Pressure 108/80 Sitting, Right Arm      Pulse Oximetry 99%, ROOM AIR            Pain Score      Experiencing any pain?:  No      Pain Scale Used:  Numerical      Pain Intensity:  0            Fatigue Score      Experiencing any fatigue?:  No      Fatigue (0-10 scale):  0 (none)            EXAM      General Appearance:  Positive for: Alert, Oriented x3, Cooperative      Respiratory:  Positive for: CTAB      Abdomen/Gastro:  Positive for: Normal Active Bowel Sounds, Soft      Cardiovascular:  Positive for: RRR      Psychiatric:  Positive for: AAO X 3      Neurologic:  Positive for: Cranial Ner II-XII Intact      Lymphatic:  Negative for: Axillary, Cervical, Epitrochlear, Femoral,     Infraclavicular, Inguinal,  Occipital, Popliteal, Posterior auricular,     Preauricular, Supraclavicular            PREVENTION      Date Influenza Vaccine Given:  Nov 11, 2020      Influenza Vaccine Declined:  No      2 or More Falls in Past Year?:  No      Fall Past Year with Injury?:  No      Hx Pneumococcal Vaccination:  Yes      Encouraged to follow-up with:  PCP regarding preventative exams.      Chart initiated by      DOTTIE ARIZMENDI            ALLERGY/MEDS      Allergies      Coded Allergies:             PENICILLINS (Verified  Allergy, Severe, HIVES, 12/22/20)           NAPROXEN (Verified  Adverse Reaction, Unknown, Mouth sores, 12/22/20)            Medications      Last Reconciled on 4/14/20 13:31 by SEAN TROY MD      Zinc Gluconate (Zinc, Elemental) 30 Mg Tab      2 TAB PO QDAY, TAB         Reported         4/14/20       (Vitamin D)   No Conflict Check      1000 MG PO QDAY         Reported         12/4/19       Acetaminophen Es (Tylenol Extra Strength) 500 Mg Tablet      500 MG PO QID PRN for PAIN OR FEVER, #100 TAB 0 Refills         Reported         8/5/19       Cyanocobalamin (Vitamin B-12*) 1,000 Mcg Tablet.er      1000 MCG PO QDAY, #30 TAB.ER         Reported         8/6/18       Levothyroxine (Levothyroxine) 0.025 Mg Tablet      0.025 MG PO QDAY@07, #30 TAB 0 Refills         Reported         8/6/18       Melatonin (Melatonin) 5 Mg Tablet      5 MG PO HS, TAB         Reported         7/13/17       Ascorbic Acid (Vitamin C*) 1,000 Mg Tablet      1000 MG PO QDAY, TAB         Reported         1/6/17       Garlic (Garlic Oil) 1,000 Mg Capsule      2000 MG PO BID, CAP         Reported         1/6/17      Medications Reviewed:  Changes made to meds            IMPRESSION/PLAN      Impression      1.  History of secondary erythrocytosis: Current hemoglobin ranges between     16-16.5            Diagnosis      Notes      Discontinued Medications      * Azithromycin Z-Landon (Zithromax Z-Landon) 250 MG TABLET: 250 MG PO ASDIR #1          Instructions: Take 500 mg (two tablets) by mouth the first day, then 250 mg        (one tablet) daily until all taken.      * Ezetimibe (Zetia) 10 MG TABLET: 10 MG PO QDAY 30 Days #30            Plan      1.  We will continue to follow-up annually without any further investigation for    now            Patient Education      Patient Education Provided:  Yes            Electronically signed by Behairy,Ahmed S  12/22/2020 14:59       Disclaimer: Converted document may not contain table formatting or lab diagrams. Please see [employer] for the authenticated document.

## 2021-05-28 NOTE — PROGRESS NOTES
Patient: YAZMIN FARMER     Acct: [id number]     Report: #[id number]  UNIT #: [id number]     : 1950    Encounter Date:2020  PRIMARY CARE: Alysa Atkins  ***Signed***  --------------------------------------------------------------------------------------------------------------------  Progress Note      DATE: 20      Primary Care Provider:  Alysa Atkins      Referring Provider:  Juan Melton      Chief Complaint      [facility] PT Requested for cough/headcold            Subjective      69-year-old white female consult requesting to be seen because she could not see    her family care provider.  This is during the time of the pandemic.  Patient     claims that she has had a cough for the past 2 months which she does not attrib    sandra to the coronavirus but to her allergies.  She has sputum productive of     yellow expectoration which cleared up for a month and recurred.  Her cough is     usually at night and in the morning when she wakes up.  Her phlegm apparently is    getting clear.  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  She denies any fever nor shortness of breath.            Patient continues to smoke.  She also has a noncalcified nodule in her lung.            Patient requests refill of her Zetia.  Her doctor's office is closed.            Past Med/Surg History            Past Med/Surg History:   No Hypertension             No Diabetes Mellitus             No Heart Disease             No Cancer             Other (hyperlipidemia, hypothyroidism)            Social History      Social History:  Tobacco Use (34 pack years); No Alcohol Use, No Recreational     Drug use            Allergies      Coded Allergies:             PENICILLINS (Verified  Allergy, Severe, HIVES, 18)           NAPROXEN (Verified  Adverse Reaction, Unknown, Mouth sores, 18)            Medications      Medications    Last Reconciled on 20 13:31 by SEAN TROY MD       Ezetimide (Zetia) 10 Mg Tablet      10 MG PO QDAY for 30 Days, #30 TAB 0 Refills         Prov: VezaMia         4/14/20       Azithromycin Z-Landon (Zithromax Z-Landon) 250 Mg Tablet      250 MG PO ASDIR, #1 PACKET         Prov: Veza,Pinal         4/14/20       Zinc Gluconate (Zinc, Elemental) 30 Mg Tab      2 TAB PO QDAY, TAB         Reported         4/14/20       Ezetimide (Zetia) 10 Mg Tablet      10 MG PO QDAY, #30 TAB 0 Refills         Reported         12/4/19       (Vitamin D)   No Conflict Check      1000 MG PO QDAY         Reported         12/4/19       Acetaminophen Es (Tylenol Extra Strength) 500 Mg Tablet      500 MG PO QID PRN for PAIN OR FEVER, #100 TAB 0 Refills         Reported         8/5/19       Psyllium Seed (With Sugar) (Metamucil) 575 Gm Powder      1 TBSP PO QDAY, #1 BOTTLE         Reported         8/5/19       Cyanocobalamin (Vitamin B-12*) 1,000 Mcg Tablet.er      1000 MCG PO QDAY, #30 TAB.ER         Reported         8/6/18       Halobetasol Propionate 0.05% Oint (Halobetasol Propionate 0.05% Oint) 50 Gm     Oint...g.      1 APL TOPICAL BID PRN for ITCHING, TUBE         Reported         8/6/18       Levothyroxine (Levothyroxine) 0.025 Mg Tablet      0.025 MG PO QDAY@07, #30 TAB 0 Refills         Reported         8/6/18       Melatonin (Melatonin) 5 Mg Tablet      5 MG PO HS, TAB         Reported         7/13/17       Ascorbic Acid (Vitamin C*) 1,000 Mg Tablet      1000 MG PO QDAY, TAB         Reported         1/6/17       Garlic (Garlic Oil) 1,000 Mg Capsule      2000 MG PO BID, CAP         Reported         1/6/17      Current Medications      Current Medications Reviewed 4/14/20            Pain Assessment      Pain Intensity:  0      Description:  None            Review of Systems      General:  Anxiety, Fatigue Scale: (4), Pain Scale: (0)      HEENT:  No Dysphagia      Respiratory:  Cough; No Shortness of Air; Sputum Changes (Yellow/clear),     Wheezing      Cardiovascular:  No Chest Pain       Gastrointestinal:  No Nausea, No Vomiting; Appetite Fair (Fair)      Genitourinary:  No Nocturia      Musculoskeletal:  No Joint Effusions      Endocrine:  No Heat Intolerance      Hematologic:  No Bleeding      Allergic/Immunologic:  No Hives      Psychological:  Anxiety; No Depression      Neurological:  Headaches; No Dizziness      Skin:  No Rash, No Open Wounds      Review of Systems      PT reports having headcold for two months            Vitals      Height 5 ft 2 in / 157.48 cm      Weight 131 lbs 6.4 oz / 59.632764 kg      BSA 1.60 m2      BMI 24.0 kg/m2      Temperature 98.2 F / 36.78 C      Pulse 79      Respirations 12      Blood Pressure 140/78      Pulse Oximetry 98%            Exam      Constitutional:  No acute distress, Conversant, Pleasant      Eyes:  Anicteric sclerae, Palpebral Conjunctivae (Pink), CALDERON      Neck:  Supple, Full Range of Motion      Cardiovascular:  RRR; No Murmurs; Normal PMI; No Peripheral Edema      Lungs:  Clear to Ausculation, Normal Respiratory Effort; No Rhonchi; Crackles     (Bibasilar); No Wheezes, No Tachypnea      Abdomen:  Soft; No Tenderness      Chest:  Other (Symmetrical)      Lymphatic:  No Neck      Extremities:  No digital cyanosis, No digital ischemia, Normal gait, Other (No     deformity)      Neurological:  Cranial Nerve II-XII (Intact); No Focal Sensory deficits      Psychological:  Appropriate affect, Appropriate mood, Intact judgement, Alert      Skin:  Other (No dermatoses)            Impression/Problem List      Cough productive of yellowish expectoration with no accompanying shortness of     breath nor fever      Erythrocytosis, secondary -  patient claims that it is probably worse this time     because she has not been drinking a lot of water like she did last summer      Macrocytosis       Hyperlipidemia      Gastroesophageal reflux disease      Hematuria, history of      History of gout      Nicotine dependence      Right upper lobe nodule last  CAT scan was July 30, 2019      Vitamin D deficiency      Hypothyroidism      Cough present for greater than 3 weeks - R05            Notes      New Medications      * Zinc Gluconate (Zinc, Elemental) 30 MG TAB: 2 TAB PO QDAY         Instructions: 1 TAB      * Azithromycin Z-Landon (Zithromax Z-Landon) 250 MG TABLET: 250 MG PO ASDIR #1         Instructions: Take 500 mg (two tablets) by mouth the first day, then 250 mg        (one tablet) daily until all taken.      * Ezetimide (Zetia) 10 MG TABLET: 10 MG PO QDAY 30 Days #30      New Diagnostics      * CBC With Auto Diff, Stat         Dx: Erythrocytosis - D75.1      Problems:        (1) Cough present for greater than 3 weeks      (2) Erythrocytosis      (3) Hyperlipidemia      Qualifiers:           Qualified Codes:  E78.5 - Hyperlipidemia, unspecified      (4) Hypothyroidism, unspecified      (5) Macrocytosis without anemia            Plan      CBC today      Chest x-ray PA and lateral      Covid testing -patient referred to  [facility]      Z-Landon      Refill Zetia for 1 month only      Follow-up in 6 months for erythrocytosis            Patient Education:        Cough            PREVENTION      Hx Influenza Vaccination:  Yes      Date Influenza Vaccine Given:  Nov 11, 2019      Influenza Vaccine Declined:  No      2 or More Falls Past Year?:  No      Fall Past Year with Injury?:  No      Encouraged to follow-up with:  PCP regarding preventative exams.      Chart initiated by      SU Membreno MA            Electronically signed by Mia Woods  04/14/2020 13:31       Disclaimer: Converted document may not contain table formatting or lab diagrams. Please see [employer] for the authenticated document.

## 2021-05-28 NOTE — PROGRESS NOTES
Patient: YAZMIN FARMER     Acct: [id number]     Report: #[id number]  UNIT #: [id number]     : 1950    Encounter Date:2018  PRIMARY CARE: Alysa Atkins  ***Signed***  --------------------------------------------------------------------------------------------------------------------  Progress Note      DATE: 18      Primary Care Provider:  Alysa Atkins      Referring Provider:  Juan Melton      Chief Complaint      Follow Elevated H and H            Subjective      67-year-old white female has a history of erythrocytosis.  This was felt to be     secondary to her chronic cigarette smoking.  Patient also has a history of lung     nodules that has been followed up with CAT scans.            Patient claims that she started taking levothyroxine about 2-3 months ago.  She     was complaining of prolonged fatigue after torso work.  After starting on the     levothyroxine 25  g per day patient had more energy.            Past Med/Surg History            Past Med/Surg History:   No Hypertension             No Diabetes Mellitus             No Heart Disease             No Cancer             Other (hyperlipidemia)            Social History      Social History:  Tobacco Use (34 pack years), No Alcohol Use, No Recreational     Drug use            Allergies      Coded Allergies:             PENICILLINS (Verified  Allergy, Severe, HIVES, 18)           NAPROXEN (Verified  Adverse Reaction, Unknown, Mouth sores, 18)            Medications      Medications    Last Reconciled on 18 13:39 by SEAN TROY MD      Lactobac Cmb #3/Fos/Pantethine (Probiotic   1 EACH PO, CAP         Reported         18       Dicyclomine Hcl (Dicyclomine*) 10 Mg Capsule      10 MG PO ACHS, CAP         Reported         18       diphenhydrAMINE HCl (Q-Dryl) 25 Mg Cap      25 MG PO HS, CAP         Reported         18       Cyanocobalamin (Vitamin B-12*) 1,000 Mcg Tablet.er      1000 MCG PO QDAY, #30  TAB.ER         Reported         8/6/18       Halobetasol Propionate (Ultravate 0.05%) 50 Gm Oint...g.      1 APL TOPICAL BID, TUBE         Reported         8/6/18       Levothyroxine (Levothyroxine) 0.025 Mg Tablet      0.025 MG PO QDAY@07, #30 TAB 0 Refills         Reported         8/6/18       Colestipol HCl (Colestipol HCl) 1 Gm Tablet      1 GM PO QDAY for 30 Days, #30 TAB         Reported         8/6/18       Loperamide HCl (Imodium) 2 Mg Capsule      4 MG PO ASDIR Y for DIARRHEA, CAP         Reported         2/5/18       Melatonin (Melatonin) 5 Mg Tablet      5 MG PO HS, TAB         Reported         7/13/17       Ascorbic Acid (Vitamin C*) 1,000 Mg Tablet      1000 MG PO QDAY, TAB         Reported         1/6/17       Garlic (Garlic Oil) 1,000 Mg Capsule      2000 MG PO BID, CAP         Reported         1/6/17      Current Medications      Current Medications Reviewed 8/6/18            Pain Assessment      Description:  None            Review of Systems      General:  No Anxiety, No Pain Scale:, No Fever, No Other      HEENT:  No Dysphagia, No Hearing Changes, No Rhinorrhea, No Tinnitus, No Visual     Changes, No Nasal Congestion, No Epistaxis, No Other      Respiratory:  No Cough, No Shortness of Air, No Sputum Changes, No Wheezing, No     Hemoptysis, No Congestion, No Other      Cardiovascular:  No Chest Pain, No Pedal Edema, No Orthopnea, No Palpitations,     No Chest Pressure, No Dizziness, No Other      Gastrointestinal:  No Nausea, No Vomiting, No Dysphagia, Diarrhea, No Appetite     Fair, Appetite Poor, No Early Satiety, No Other      Genitourinary:  No Nocturia, No Dysuria, No Other      Musculoskeletal:  No Joint Effusions, Joint Tenderness, Joint Stiffness, No     Myalgias, Aches, Pains, No Other      Endocrine:  No Heat Intolerance, No Cold Intolerance, No Fatigue, No Blood     Sugar Control, No Other      Hematologic:  Bleeding, Bruising, No Swollen Glands, No Other      Allergic/Immunologic:  No  Hives, No Throat closing off, No Nasal drip, No Itchy     eyes, No Hay fever, No Other      Psychological:  No Anxiety, No Depression, No Other      Neurological:  Headaches, Dizziness, No Weakness, Numbness (hands), No Other      Skin:  Rash, No Open Wounds, No Edema, No Other            Vitals      Height 5 ft 2 in / 157.48 cm      Weight 129 lbs 6 oz / 58.745550 kg      BSA 1.61 m2      BMI 23.7 kg/m2      Temperature 98.3 F / 36.83 C - Oral      Pulse 73      Respirations 18      Blood Pressure 112/70 Sitting, Left Arm      Pulse Oximetry 99%, room air            Exam      Constitutional:  No acute distress, Conversant, Pleasant      Eyes:  Anicteric sclerae, Palpebral Conjunctivae (pink), CALDERON      HENT:  Oropharynx clear, No Erythema, Buccal mucosae (pink)      Neck:  Supple      Cardiovascular:  RRR, No Murmurs, Normal PMI, No Peripheral Edema      Lungs:  Clear to Ausculation, Normal Respiratory Effort, Other (speaking full     sentences)      Abdomen:  Soft, NABS, No Tenderness      Chest:  Other (symmetrical and equal)      Lymphatic:  No Neck      Extremities:  No digital cyanosis, Normal gait, Other (deformity no limitation     range of motion)      Neurological:  Cranial Nerve II-XII, No Focal Sensory deficits      Psychological:  Intact judgement, Alert      Skin:  Normal temperature, Other (no dermatosis)            Lab Results      Laboratory Tests      4/11/18 10:22            7/30/18 13:35            Laboratory Tests            Test        4/11/18      10:22 6/7/18      09:55 7/30/18      13:35 8/2/18      21:57             Sodium Level        144 mmol/L      (135-147)                      Potassium Level        4.3 mmol/L      (3.5-5.3)                      Chloride Level        105 mmol/L      ()                      Carbon Dioxide Level        24 mmol/L      (22-32)                      Anion Gap        19 mmol/L      (8-19)                      Blood Urea Nitrogen        15 mg/dL       (5-25)                      Creatinine        0.90 mg/dL      (0.50-0.90)                      Glomerular Filtration Rate      Calc >60      mL/min/{1.73_m2}                      BUN/Creatinine Ratio        17 {ratio}      (6-20)                      Glucose Level        71 mg/dL      (65-99)                      Serum Osmolality 297 (273-304)                 Calcium Level        10.0 mg/dL      (8.7-10.4)                      Total Bilirubin        0.39 mg/dL      (0.20-1.30)                      Aspartate Amino Transf      (AST/SGOT) 22 U/L (15-50)                      Alanine Aminotransferase      (ALT/SGPT) 19 U/L (10-40)                      Alkaline Phosphatase        94 U/L      ()                      Total Protein        7.1 g/dL      (6.3-8.2)                      Albumin        4.1 g/dL      (3.5-5.0)                      Globulin        3.0 g/dL      (2.0-3.5)                      Albumin/Globulin Ratio        1.4 {ratio}      (1.4-2.6)                      Triglycerides Level        179 mg/dL      ()                      Cholesterol Level        215 mg/dL      (107-200)                      LDL Cholesterol        136 mg/dL      ()                      VLDL Cholesterol        36 mg/dL      (5-37)                      HDL Cholesterol        43 mg/dL      (40-60)                      Cholesterol/HDL Ratio        5.0 NA      (3.0-6.0)                      Vitamin D 25-Hydroxy        59.8 ng/mL      (30.0-100.0)                      Free Thyroxine        1.1 ng/dL      (0.9-1.8)                      Thyroid Stimulating Hormone      (TSH)         2.000 m(iU)/L      (0.270-4.200)                      White Blood Count                6.55 10*3/uL      (4.80-10.80)              Red Blood Count                4.71 10*6/uL      (4.20-5.40)              Hemoglobin                16.30 g/dL      (12.00-16.00)              Hematocrit                46.0 %      (37.0-47.0)              Mean  Corpuscular Volume                97.5 fL      (81.0-99.0)              Mean Corpuscular Hemoglobin                34.7 pg      (27.0-31.0)              Mean Corpuscular Hemoglobin      Concent                 35.6 %      (33.0-37.0)              Red Cell Distribution Width                13.0 %      (11.5-14.5)              Platelet Count                164.00 10*3/uL      (130..00)              Mean Platelet Volume                7.1 fL      (7.4-10.4)              Granulocytes (%)                63.2 %      (30.0-85.0)              Lymphocytes (%) (Auto)                27.80 %      (20.00-45.00)              Monocytes (%) (Auto)                6.54 %      (3.00-10.00)              Eosinophils (%) (Auto)                1.74 %      (0.00-7.00)              Basophils (%) (Auto)                0.79 %      (0.00-3.00)              Granulocytes #                4.14 10*3/uL      (2.00-8.00)              Lymphocytes # (Auto)                1.82 10*3/uL      (1.00-5.00)              Monocytes # (Auto)                0.43 10*3/uL      (0.20-1.20)              Eosinophils # (Auto)                0.11 10*3/uL      (0.00-0.70)              Basophils # (Auto)                0.05 10*3/uL      (0.00-0.20)              Nucleated Red Blood Cells %                0.00 %      (0.00-0.01)              Absolute Reticulocyte Count   101.00 10*3/uL               Percent Reticulocyte Count   2.15 %               Methylmalonic Acid                69 nmol/L      (0-378)              Lab Scanned Report                LAB FINAL      CUMULATIVES -            CAT scan of the abdomen and pelvis done August 2017 showed bilateral     nonobstructive renal calculi, small hiatal hernia, atherosclerotic vascular     calcifications, degenerative changes of the lumbar spine            Impression/Problem List      Erythrocytosis may be secondary to chronic cigarette smoking      Macrocytosis better      Hyperlipidemia      Gastroesophageal reflux  disease      Hematuria, history of      History of gout      Nicotine dependence      Right upper lobe nodule      Vitamin D deficiency      Notes      New Medications      * Colestipol HCl 1 GM TABLET: 1 GM PO QDAY 30 Days #30      * Levothyroxine 0.025 MG TABLET: 0.025 MG PO QDAY@07 #30      * Halobetasol Propionate (Ultravate 0.05%) 50 GM OINT...G.: 1 APL TOPICAL BID      * CYANOCOBALAMIN (Vitamin B-12*) 1,000 MCG TABLET.ER: 1,000 MCG PO QDAY #30      * diphenhydrAMINE HCl (Q-Dryl) 25 MG CAP: 25 MG PO HS      * DICYCLOMINE HCL (Dicyclomine*) 10 MG CAPSULE: 10 MG PO ACHS      * LACTOBAC CMB #3/FOS/PANTETHINE (Probiotic   1 EACH PO            Plan      Explained to patient why she  smoking causes erythrocytosis.      Smoking cessation      CT of the chest to follow-up lung nodule      Return to clinic next year with CBC.            Patient Education:        Nicotine Addiction            PREVENTION      Hx Influenza Vaccination:  Yes (2017)      Influenza Vaccine Declined:  No      Hx Pneumococcal Vaccination:  Yes (2017)      Encouraged to follow-up with:  PCP regarding preventative exams.      Chart initiated by      Ilda Laws MA                 Disclaimer: Converted document may not contain table formatting or lab diagrams. Please see [email or web address] for the authenticated document.

## 2021-05-28 NOTE — PROGRESS NOTES
Patient: YAZMIN FARMER     Acct: [id number]     Report: #[id number]  UNIT #: [id number]     : 1950    Encounter Date:2019  PRIMARY CARE: Alysa Atkins  ***Signed***  --------------------------------------------------------------------------------------------------------------------  Progress Note      DATE: 19      Primary Care Provider:  Alysa Atkins      Referring Provider:  Juan Metlon      Chief Complaint      FU/elevated H        Subjective      68-year-old white female comes in to see me for elevated hemoglobin hematocrit     and microcytosis.  Unfortunately patient continues to smoke.      Patient claims that she feels pretty good however she is a pain on her left hip     especially on sitting prolonged period of time the pain goes away when she     starts moving.  Patient has been seeing a chiropractor who has told her to keep     on moving and exercising.            Past Med/Surg History            Past Med/Surg History:   No Hypertension             No Diabetes Mellitus             No Heart Disease             No Cancer             Other (hyperlipidemia, hypothyroidism)            Social History      Social History:  Tobacco Use (34 pack years); No Alcohol Use, No Recreational     Drug use            Allergies      Coded Allergies:             PENICILLINS (Verified  Allergy, Severe, HIVES, 18)           NAPROXEN (Verified  Adverse Reaction, Unknown, Mouth sores, 18)            Medications      Medications    Last Reconciled on 19 11:24 by SEAN TROY MD      Ezetimide (Zetia) 10 Mg Tablet      10 MG PO QDAY, #30 TAB 0 Refills         Reported         19       (Vitamin D)   No Conflict Check      1000 MG PO QDAY         Reported         19       Acetaminophen Es (Tylenol Extra Strength) 500 Mg Tablet      500 MG PO QID PRN for PAIN OR FEVER, #100 TAB 0 Refills         Reported         19       Psyllium Seed (With Sugar) (Metamucil) 577  Gm Powder      1 TBSP PO QDAY, #1 BOTTLE         Reported         8/5/19       Cyanocobalamin (Vitamin B-12*) 1,000 Mcg Tablet.er      1000 MCG PO QDAY, #30 TAB.ER         Reported         8/6/18       Halobetasol Propionate 0.05% Oint (Halobetasol Propionate 0.05% Oint) 50 Gm     Oint...g.      1 APL TOPICAL BID PRN for ITCHING, TUBE         Reported         8/6/18       Levothyroxine (Levothyroxine) 0.025 Mg Tablet      0.025 MG PO QDAY@07, #30 TAB 0 Refills         Reported         8/6/18       Melatonin (Melatonin) 5 Mg Tablet      5 MG PO HS, TAB         Reported         7/13/17       Ascorbic Acid (Vitamin C*) 1,000 Mg Tablet      1000 MG PO QDAY, TAB         Reported         1/6/17       Garlic (Garlic Oil) 1,000 Mg Capsule      2000 MG PO BID, CAP         Reported         1/6/17      Current Medications      Current Medications Reviewed 12/4/19            Pain Assessment      Pain Intensity:  0      Description:  None            Review of Systems      General:  Fatigue Scale: (0), Pain Scale: (0)      HEENT:  No Hearing Changes, No Tinnitus, No Visual Changes      Respiratory:  No Shortness of Air, No Sputum Changes, No Wheezing      Cardiovascular:  No Pedal Edema, No Palpitations, No Chest Pressure      Gastrointestinal:  Nausea; No Vomiting, No Constipation; Appetite Fair      Genitourinary:  Nocturia (X 1); No Dysuria      Musculoskeletal:  No Joint Tenderness, No Joint Stiffness      Endocrine:  No Cold Intolerance, No Fatigue      Hematologic:  No Bruising      Allergic/Immunologic:  No Throat closing off, No Nasal drip      Psychological:  No Depression      Neurological:  Headaches; No Dizziness, No Weakness            Vitals      Height 5 ft 2 in / 157.48 cm      Weight 131 lbs 6.4 oz / 59.233341 kg      BSA 1.60 m2      BMI 24.0 kg/m2      Temperature 97.6 F / 36.44 C      Pulse 72      Blood Pressure 117/70      Pulse Oximetry 95%            Exam      Constitutional:  No acute distress,  Conversant, Pleasant      Eyes:  Anicteric sclerae, Palpebral Conjunctivae (Pink), CALDERON      HENT:  Oropharynx clear; No Erythema; Buccal mucosae (Pink)      Neck:  Supple, Full Range of Motion      Cardiovascular:  RRR; No Murmurs; Normal PMI; No Peripheral Edema      Lungs:  Clear to Ausculation, Normal Respiratory Effort, Other (Diminished     breath sounds)      Abdomen:  Soft, NABS; No Masses, No Tenderness      Chest:  Other (Symmetrical and equal)      Extremities:  No digital cyanosis, No digital ischemia, Normal gait, Other (No     deformity)      Neurological:  Cranial Nerve II-XII (Intact); No Focal Sensory deficits      Psychological:  Appropriate affect, Appropriate mood, Intact judgement, Alert      Skin:  Other (No dermatosis)            Lab Results      Laboratory Tests      10/15/19 13:27 11/25/19 10:29            Laboratory Tests            Test       10/15/19      13:27 11/25/19      10:29 11/27/19      21:57             Sodium Level       137 mmol/L      (135-147)                           Potassium Level       4.3 mmol/L      (3.5-5.3)                           Chloride Level       99 mmol/L      ()                           Carbon Dioxide Level       24 mmol/L      (22-32)                           Anion Gap       18 mmol/L      (8-19)                           Blood Urea Nitrogen       14 mg/dL      (5-25)                           Creatinine       0.98 mg/dL      (0.50-0.90)                           Glomerular Filtration Rate      Calc 59      mL/min/{1.73_m2}                           BUN/Creatinine Ratio       14 {ratio}      (6-20)                           Glucose Level       95 mg/dL      (65-99)                           Serum Osmolality 284 (273-304)                Calcium Level       10.4 mg/dL      (8.7-10.4)                           Total Bilirubin       0.40 mg/dL      (0.20-1.30)                           Aspartate Amino Transf      (AST/SGOT) 24 U/L (15-50)                            Alanine Aminotransferase      (ALT/SGPT) 18 U/L (10-40)                           Alkaline Phosphatase       97 U/L      ()                           Total Protein       7.9 g/dL      (6.3-8.2)                           Albumin       4.5 g/dL      (3.5-5.0)                           Globulin       3.4 g/dL      (2.0-3.5)                           Albumin/Globulin Ratio       1.3 {ratio}      (1.4-2.6)                           Vitamin B12 Level       1093 pg/mL      (211-911)                           Vitamin D 25-Hydroxy       84.7 ng/mL      (30.0-100.0)                           Folate       11.7 ng/mL      (4.8-20.0)                           Thyroid Stimulating Hormone      (TSH) 1.970 m(iU)/L      (0.270-4.200)                           Free Thyroxine       1.2 ng/dL      (0.9-1.8)                           White Blood Count              6.06 10*3/uL      (4.80-10.80)                    Red Blood Count              4.74 10*6/uL      (4.20-5.40)                    Hemoglobin              16.0 g/dL      (12.0-16.0)                    Hematocrit              48.0 %      (37.0-47.0)                    Mean Corpuscular Volume              101.3 fL      (81.0-99.0)                    Mean Corpuscular Hemoglobin              33.8 pg      (27.0-31.0)                    Mean Corpuscular Hemoglobin      Concent        33.3      (33.0-37.0)                    Red Cell Distribution Width              13.0 %      (11.7-14.4)                    RDW Standard Deviation              48.9 fL      (36.4-46.3)                    Platelet Count              177 10*3/uL      (130-400)                    Mean Platelet Volume              10.1 fL      (9.4-12.3)                    Granulocytes (%)              48.5 %      (30.0-85.0)                    Granulocytes #              2.94 10*3/uL      (2.00-8.00)                    Lymphocytes # (Auto)              1.99 10*3/uL      (1.00-5.00)                     Monocytes # (Auto)              0.60 10*3/uL      (0.20-1.20)                    Eosinophils # (Auto)              0.47 10*3/uL      (0.00-0.70)                    Basophils # (Auto)              0.04 10*3/uL      (0.00-0.20)                    Immature Granulocytes %              0.3 %      (0.0-1.8)                    Lymphocytes %              32.8 %      (20.0-45.0)                    Monocytes %              9.9 %      (3.0-10.0)                    Eosinophils %              7.8 %      (0.0-7.0)                    Basophils %              0.7 %      (0.0-3.0)                    Nucleated Red Blood Cells %              0.00 %      (0.00-0.70)                    Immature Granulocytes #              0.02 10*3/uL      (0.00-0.20)                    Iron Level              92 ug/dL      ()                    Total Iron Binding Capacity              360 ug/dL      (245-450)                    Percent Iron Saturation  26 % (20-55)               Transferrin              252.00 mg/dL      (250..00)                    Triglycerides Level              262 mg/dL      ()                    Cholesterol Level              214 mg/dL      (107-200)                    LDL Cholesterol              124 mg/dL      ()                    VLDL Cholesterol              52 mg/dL      (5-37)                    HDL Cholesterol              38 mg/dL      (40-60)                    Cholesterol/HDL Ratio              5.6 NA      (3.0-6.0)                    Homocysteine              13.8 umol/L      (3.7-13.9)                    Lab Scanned Report              LAB FINAL      CUMULATIVES -            Impression/Problem List      Erythrocytosis, secondary -  patient claims that it is probably worse this time     because she has not been drinking a lot of water like she did last summer      Macrocytosis       Hyperlipidemia      Gastroesophageal reflux disease      Hematuria, history of      History of gout       Nicotine dependence      Right upper lobe nodule last CAT scan was July 30, 2019      Vitamin D deficiency      Hypothyroidism      Macrocytosis without anemia - D75.89            Erythrocytosis - D75.1            Hyperlipidemia - E78.5            Hypothyroidism, unspecified - E03.9            Notes      New Medications      * (VITAMIN D): 1,000 MG PO QDAY      * Ezetimide (Zetia) 10 MG TABLET: 10 MG PO QDAY #30      Problems:        (1) Hypothyroidism, unspecified      (2) Hyperlipidemia      Qualifiers:           Qualified Codes:  E78.5 - Hyperlipidemia, unspecified      (3) Erythrocytosis      (4) Macrocytosis without anemia            Plan      Patient needs a CT scan of the lung without contrast on July 30, 2020      Return to clinic in 6 months with a CBC      Smoking cessation            PREVENTION      Hx Influenza Vaccination:  Yes      Date Influenza Vaccine Given:  Nov 11, 2019      Influenza Vaccine Declined:  No      2 or More Falls Past Year?:  No      Fall Past Year with Injury?:  No      Encouraged to follow-up with:  PCP regarding preventative exams.      Chart initiated by      ROSAURA SAN.            Electronically signed by Mia Woods  12/04/2019 11:24       Disclaimer: Converted document may not contain table formatting or lab diagrams. Please see [email or web address] for the authenticated document.

## 2021-05-28 NOTE — PROGRESS NOTES
Patient: YAZMIN FARMER     Acct: [id number]     Report: #[id number]  UNIT #: [id number]     : 1950    Encounter Date:2019  PRIMARY CARE: Alysa Atkins  ***Signed***  --------------------------------------------------------------------------------------------------------------------  Progress Note      DATE: 19      Primary Care Provider:  Alysa Atkins      Referring Provider:  Juan Melton      Chief Complaint      Elevated H        Subjective      68-year-old white female comes into the office yearly for follow-up of her     erythrocytosis and macrocytosis.            Patient claims that she has started drinking green tea and this has resolved her    diarrhea.      She also started taking Malbar spinach and her cholesterol gone down.            Patient offers no complaints.            Past Med/Surg History            Past Med/Surg History:   No Hypertension             No Diabetes Mellitus             No Heart Disease             No Cancer             Other (hyperlipidemia)            Social History      Social History:  Tobacco Use (34 pack years); No Alcohol Use, No Recreational     Drug use            Allergies      Coded Allergies:             PENICILLINS (Verified  Allergy, Severe, HIVES, 18)           NAPROXEN (Verified  Adverse Reaction, Unknown, Mouth sores, 18)            Medications      Medications    Last Reconciled on 19 11:28 by SEAN TROY MD      Acetaminophen Es (Tylenol Extra Strength) 500 Mg Tablet      500 MG PO QID PRN for PAIN OR FEVER, #100 TAB 0 Refills         Reported         19       Psyllium Seed (With Sugar) (Metamucil) 575 Gm Powder      1 TBSP PO QDAY, #1 BOTTLE         Reported         19       Cyanocobalamin (Vitamin B-12*) 1,000 Mcg Tablet.er      1000 MCG PO QDAY, #30 TAB.ER         Reported         18       Halobetasol Propionate (Ultravate 0.05%) 50 Gm Oint...g.      1 APL TOPICAL BID PRN for ITCHING, TUBE          Reported         8/6/18       Levothyroxine (Levothyroxine) 0.025 Mg Tablet      0.025 MG PO QDAY@07, #30 TAB 0 Refills         Reported         8/6/18       Melatonin (Melatonin) 5 Mg Tablet      5 MG PO HS, TAB         Reported         7/13/17       Ascorbic Acid (Vitamin C*) 1,000 Mg Tablet      1000 MG PO QDAY, TAB         Reported         1/6/17       Garlic (Garlic Oil) 1,000 Mg Capsule      2000 MG PO BID, CAP         Reported         1/6/17      Current Medications      Current Medications Reviewed 8/5/19            Pain Assessment      Pain Intensity:  0      Description:  None            Review of Systems      General:  No Fatigue Scale:, No Pain Scale:      HEENT:  No Dysphagia, No Hearing Changes, No Visual Changes      Respiratory:  No Cough, No Shortness of Air, No Wheezing      Cardiovascular:  No Chest Pain, No Palpitations      Gastrointestinal:  No Nausea, No Vomiting, No Constipation; Diarrhea, Appetite     Fair      Genitourinary:  No Nocturia, No Dysuria; Other (Kidney Stones)      Musculoskeletal:  No Aches, No Pains      Endocrine:  No Heat Intolerance, No Fatigue      Hematologic:  No Bleeding, No Swollen Glands      Allergic/Immunologic:  No Hives, No Nasal drip      Psychological:  No Anxiety, No Depression      Neurological:  No Headaches, No Dizziness      Skin:  No Rash, No Edema            Vitals      Height 5 ft 2 in / 157.48 cm      Weight 127 lbs 8 oz / 57.855499 kg      BSA 1.58 m2      BMI 23.3 kg/m2      Temperature 98.3 F / 36.83 C      Pulse 66      Respirations 20      Blood Pressure 143/69      Pulse Oximetry 100%            Exam      Constitutional:  No acute distress, Conversant, Pleasant      Eyes:  Anicteric sclerae, Palpebral Conjunctivae (Pink), CALDERON      HENT:  Oropharynx clear; No Erythema; Buccal mucosae (Pink)      Neck:  Full Range of Motion      Cardiovascular:  RRR; No Murmurs; Normal PMI; No Peripheral Edema      Lungs:  Clear to Ausculation, Normal Respiratory  Effort      Abdomen:  Soft, NABS; No Masses, No Tenderness      Chest:  Other (Symmetrical and equal)      Lymphatic:  No Neck      Extremities:  No digital cyanosis, No digital ischemia, Normal gait, Other (No     deformity)      Neurological:  Cranial Nerve II-XII (Intact); No Focal Sensory deficits      Psychological:  Appropriate affect, Appropriate mood, Intact judgement, Alert      Skin:  Other (No dermatoses)            Lab Results      Laboratory Tests      4/8/19 09:15            7/30/19 09:04            Laboratory Tests            Test       4/8/19      09:15 7/30/19      09:04 8/1/19      21:58             Sodium Level       141 mmol/L      (135-147)                           Potassium Level       3.9 mmol/L      (3.5-5.3)                           Chloride Level       103 mmol/L      ()                           Carbon Dioxide Level       27 mmol/L      (22-32)                           Anion Gap       15 mmol/L      (8-19)                           Blood Urea Nitrogen 7 mg/dL (5-25)                Creatinine       0.93 mg/dL      (0.50-0.90)                           Glomerular Filtration Rate      Calc >60      mL/min/{1.73_m2}                           BUN/Creatinine Ratio       8 {ratio}      (6-20)                           Glucose Level       100 mg/dL      (65-99)                           Serum Osmolality 290 (273-304)                Calcium Level       9.4 mg/dL      (8.7-10.4)                           Total Bilirubin       0.39 mg/dL      (0.20-1.30)                           Aspartate Amino Transf      (AST/SGOT) 21 U/L (15-50)                           Alanine Aminotransferase      (ALT/SGPT) 16 U/L (10-40)                           Alkaline Phosphatase       105 U/L      ()                           Total Protein       7.2 g/dL      (6.3-8.2)                           Albumin       4.0 g/dL      (3.5-5.0)                           Globulin       3.2 g/dL      (2.0-3.5)                            Albumin/Globulin Ratio       1.3 {ratio}      (1.4-2.6)                           Triglycerides Level       244 mg/dL      ()                           Cholesterol Level       248 mg/dL      (107-200)                           LDL Cholesterol       161 mg/dL      ()                           VLDL Cholesterol       49 mg/dL      (5-37)                           HDL Cholesterol       38 mg/dL      (40-60)                           Cholesterol/HDL Ratio       6.5 NA      (3.0-6.0)                           Vitamin D 25-Hydroxy       55.4 ng/mL      (30.0-100.0)                           Thyroid Stimulating Hormone      (TSH) 2.360 m(iU)/L      (0.270-4.200)                           Free Thyroxine       1.3 ng/dL      (0.9-1.8)                           White Blood Count              5.72 10*3/uL      (4.80-10.80)                    Red Blood Count              4.54 10*6/uL      (4.20-5.40)                    Hemoglobin              15.40 g/dL      (12.00-16.00)                    Hematocrit              45.8 %      (37.0-47.0)                    Mean Corpuscular Volume              100.9 fL      (81.0-99.0)                    Mean Corpuscular Hemoglobin              33.9 pg      (27.0-31.0)                    Mean Corpuscular Hemoglobin      Concent        33.6      (33.0-37.0)                    Red Cell Distribution Width              12.9 %      (11.7-14.4)                    RDW Standard Deviation              48.4 fL      (36.4-46.3)                    Platelet Count              161.00 10*3/uL      (130..00)                    Mean Platelet Volume              9.8 fL      (9.4-12.3)                    Granulocytes (%)              64.1 %      (30.0-85.0)                    Lymphocytes (%) (Auto)              24.00 %      (20.00-45.00)                    Monocytes (%) (Auto)              7.20 %      (3.00-10.00)                    Eosinophils (%) (Auto)              4.20 %       (0.00-7.00)                    Basophils (%) (Auto)              0.30 %      (0.00-3.00)                    Granulocytes #              3.67 10*3/uL      (2.00-8.00)                    Lymphocytes # (Auto)              1.37 10*3/uL      (1.00-5.00)                    Monocytes # (Auto)              0.41 10*3/uL      (0.20-1.20)                    Eosinophils # (Auto)              0.24 10*3/uL      (0.00-0.70)                    Basophils # (Auto)              0.02 10*3/uL      (0.00-0.20)                    Immature Granulocytes %              0.2 %      (0.0-1.8)                    Nucleated Red Blood Cells %              0.00 %      (0.00-0.70)                    Immature Granulocytes #              0.01 10*3/uL      (0.00-0.20)                    Lab Scanned Report              LAB FINAL      CUMULATIVES -            Radiology Impressions      CT scan of her chest without contrast done on July 30, 2019 showed 5 mm     noncalcified nodule in the anterior right upper lobe, stable.   If the patient     is high risk consider low-dose screening CAT scan of the chest in a year.            Impression/Problem List      Erythrocytosis, secondary      Macrocytosis       Hyperlipidemia      Gastroesophageal reflux disease      Hematuria, history of      History of gout      Nicotine dependence      Right upper lobe nodule      Vitamin D deficiency      Hypothyroidism      Notes      New Medications      * PSYLLIUM SEED (WITH SUGAR) (Metamucil) 575 GM POWDER: 1 TBSP PO QDAY #1         Replaced PSYLLIUM HUSK (Metamucil) 0.52 GM CAPSULE:         1 CAP PO QDAY #30      * ACETAMINOPHEN ES (Tylenol Extra Strength) 500 MG TABLET: 500 MG PO QID PRN       PAIN OR FEVER #100      Changed Medications      * Halobetasol Propionate (Ultravate 0.05%) 50 GM OINT...G.: 1 APL TOPICAL BID       PRN ITCHING            Plan      Patient was given a list of iron rich foods that she can avoid.      Patient will come back in December, fasting,  with the following tests: Homocysti    ne, iron profile, CBC, lipid profile per request, MARGA, ferritin, rheumatoid     arthritis factor.      CT of the chest to follow-up lung nodule 1 year            Patient Education:        How to Quit Smoking            PREVENTION      Hx Influenza Vaccination:  Yes      Influenza Vaccine Declined:  No      2 or More Falls Past Year?:  No      Fall Past Year with Injury?:  No      Encouraged to follow-up with:  PCP regarding preventative exams.      Chart initiated by      JOSE MIGUEL Bell RN                 Disclaimer: Converted document may not contain table formatting or lab diagrams. Please see Departing System for the authenticated document.

## 2021-09-07 ENCOUNTER — LAB (OUTPATIENT)
Dept: LAB | Facility: HOSPITAL | Age: 71
End: 2021-09-07

## 2021-09-07 ENCOUNTER — TELEPHONE (OUTPATIENT)
Dept: INTERNAL MEDICINE | Facility: CLINIC | Age: 71
End: 2021-09-07

## 2021-09-07 DIAGNOSIS — R53.83 OTHER FATIGUE: ICD-10-CM

## 2021-09-07 DIAGNOSIS — E78.5 HYPERLIPIDEMIA, UNSPECIFIED HYPERLIPIDEMIA TYPE: Primary | ICD-10-CM

## 2021-09-07 DIAGNOSIS — E78.5 HYPERLIPIDEMIA, UNSPECIFIED HYPERLIPIDEMIA TYPE: ICD-10-CM

## 2021-09-07 LAB
ALBUMIN SERPL-MCNC: 4.3 G/DL (ref 3.5–5.2)
ALBUMIN/GLOB SERPL: 1.3 G/DL
ALP SERPL-CCNC: 134 U/L (ref 39–117)
ALT SERPL W P-5'-P-CCNC: 16 U/L (ref 1–33)
ANION GAP SERPL CALCULATED.3IONS-SCNC: 9.3 MMOL/L (ref 5–15)
AST SERPL-CCNC: 20 U/L (ref 1–32)
BILIRUB SERPL-MCNC: 0.3 MG/DL (ref 0–1.2)
BUN SERPL-MCNC: 10 MG/DL (ref 8–23)
BUN/CREAT SERPL: 10.3 (ref 7–25)
CALCIUM SPEC-SCNC: 9.7 MG/DL (ref 8.6–10.5)
CHLORIDE SERPL-SCNC: 105 MMOL/L (ref 98–107)
CHOLEST SERPL-MCNC: 145 MG/DL (ref 0–200)
CO2 SERPL-SCNC: 25.7 MMOL/L (ref 22–29)
CREAT SERPL-MCNC: 0.97 MG/DL (ref 0.57–1)
GFR SERPL CREATININE-BSD FRML MDRD: 57 ML/MIN/1.73
GLOBULIN UR ELPH-MCNC: 3.4 GM/DL
GLUCOSE SERPL-MCNC: 88 MG/DL (ref 65–99)
HDLC SERPL-MCNC: 36 MG/DL (ref 40–60)
LDLC SERPL CALC-MCNC: 77 MG/DL (ref 0–100)
LDLC/HDLC SERPL: 1.99 {RATIO}
POTASSIUM SERPL-SCNC: 4.3 MMOL/L (ref 3.5–5.2)
PROT SERPL-MCNC: 7.7 G/DL (ref 6–8.5)
SODIUM SERPL-SCNC: 140 MMOL/L (ref 136–145)
TRIGL SERPL-MCNC: 187 MG/DL (ref 0–150)
VLDLC SERPL-MCNC: 32 MG/DL (ref 5–40)

## 2021-09-07 PROCEDURE — 36415 COLL VENOUS BLD VENIPUNCTURE: CPT

## 2021-09-07 PROCEDURE — 80053 COMPREHEN METABOLIC PANEL: CPT

## 2021-09-07 PROCEDURE — 80061 LIPID PANEL: CPT

## 2021-09-16 ENCOUNTER — TELEPHONE (OUTPATIENT)
Dept: INTERNAL MEDICINE | Facility: CLINIC | Age: 71
End: 2021-09-16

## 2021-09-16 NOTE — TELEPHONE ENCOUNTER
Patient called and stated she just received a letter for jury duty. Her new patient appointment was supposed to be this week but had to be rescheduled because it was too early for her to get here. The appointment is now on Monday. She states that she has chronic diarrhea x 4 years and the cause is unknown. Could she please get an excuse for jury duty?  : 1950  Phone #: [phone]

## 2021-09-17 NOTE — TELEPHONE ENCOUNTER
I have let pt know that Callie can't do this without seeing her. She did see Dr. Barnes for awhile but they released her.

## 2021-09-17 NOTE — TELEPHONE ENCOUNTER
No since I have not seen her I can not give this. If she has chronic diarrhea then why? Has she seen a specialist (GI)? If so ask them. If not she will need one but I can not even give medical advise to someone I have not seen. Please note that this will ALWAYS be the case. Thank you

## 2021-09-20 ENCOUNTER — OFFICE VISIT (OUTPATIENT)
Dept: INTERNAL MEDICINE | Facility: CLINIC | Age: 71
End: 2021-09-20

## 2021-09-20 VITALS
WEIGHT: 119.6 LBS | DIASTOLIC BLOOD PRESSURE: 68 MMHG | SYSTOLIC BLOOD PRESSURE: 114 MMHG | TEMPERATURE: 97.2 F | OXYGEN SATURATION: 98 % | HEIGHT: 63 IN | HEART RATE: 76 BPM | BODY MASS INDEX: 21.19 KG/M2

## 2021-09-20 DIAGNOSIS — E78.2 MODERATE MIXED HYPERLIPIDEMIA NOT REQUIRING STATIN THERAPY: Primary | Chronic | ICD-10-CM

## 2021-09-20 DIAGNOSIS — E03.9 HYPOTHYROIDISM, UNSPECIFIED TYPE: Chronic | ICD-10-CM

## 2021-09-20 DIAGNOSIS — F17.200 CURRENT SMOKER: ICD-10-CM

## 2021-09-20 DIAGNOSIS — E55.9 VITAMIN D DEFICIENCY: ICD-10-CM

## 2021-09-20 PROBLEM — I69.391 DYSPHAGIA FOLLOWING CEREBRAL INFARCTION: Status: ACTIVE | Noted: 2021-09-20

## 2021-09-20 PROCEDURE — 99214 OFFICE O/P EST MOD 30 MIN: CPT | Performed by: NURSE PRACTITIONER

## 2021-09-20 RX ORDER — LOPERAMIDE HYDROCHLORIDE 2 MG/1
TABLET ORAL
COMMUNITY
End: 2022-08-23

## 2021-09-20 RX ORDER — MONTELUKAST SODIUM 4 MG/1
1 TABLET, CHEWABLE ORAL 2 TIMES DAILY
COMMUNITY
Start: 2021-08-18 | End: 2021-10-12 | Stop reason: SDUPTHER

## 2021-09-20 RX ORDER — UBIDECARENONE 100 MG
100 CAPSULE ORAL DAILY
COMMUNITY

## 2021-09-20 RX ORDER — DESONIDE 0.5 MG/G
CREAM TOPICAL
COMMUNITY

## 2021-09-20 RX ORDER — ATORVASTATIN CALCIUM 10 MG/1
10 TABLET, FILM COATED ORAL DAILY
COMMUNITY
Start: 2021-08-05 | End: 2021-12-06 | Stop reason: SDUPTHER

## 2021-09-20 RX ORDER — LEVOTHYROXINE SODIUM 0.03 MG/1
TABLET ORAL
COMMUNITY
End: 2021-09-20 | Stop reason: SDUPTHER

## 2021-09-20 RX ORDER — DIPHENHYDRAMINE HCL 25 MG
CAPSULE ORAL
COMMUNITY
End: 2021-09-20

## 2021-09-20 RX ORDER — RANITIDINE 150 MG/1
CAPSULE ORAL
COMMUNITY
End: 2021-09-20

## 2021-09-20 RX ORDER — LEVOTHYROXINE SODIUM 0.03 MG/1
25 TABLET ORAL
Qty: 90 TABLET | Refills: 1 | Status: SHIPPED | OUTPATIENT
Start: 2021-09-20 | End: 2021-12-20

## 2021-09-20 RX ORDER — CALCIUM CARBONATE 260MG(650)
TABLET,CHEWABLE ORAL
COMMUNITY

## 2021-09-20 RX ORDER — UBIDECARENONE 75 MG
CAPSULE ORAL
COMMUNITY

## 2021-09-20 RX ORDER — CHLORAL HYDRATE 500 MG
CAPSULE ORAL
COMMUNITY
End: 2021-09-20

## 2021-09-20 NOTE — PROGRESS NOTES
Chief Complaint  Follow-up (pt wants to discuss quite a few things )    Subjective    History of Present Illness:  Samira Kramer presents to [facility]  for follow-up chronic disease management.  The patient was started on Lipitor 10 mg daily at last visit. She was started on Lipitor 3 months ago and started having muscle aches all over. She is taking the co-Q enzyme 10 and is tolerating it.  She is also taking Colestipol 1 gm twice daily for diarrhea.  Recent labs were reviewed. Denies chest pain, abdominal pain, or change in bowel habits. She has had diarrhea for 4 years and follows with Dr. Barnes. She has shortness of air but it is not any worse. Coughs in the morning but never bloody.  She is a smoker and has a pulmonary nodule and is due for a CT 12/28/2021. She wants to wait on getting the CT. Smoking history is +30 pack years at 1 pack/day.    Also Dr. Barnes (elevated liver enzymes), and urology.  Last colonoscopy 7/2017 per Dr. Henrandez and due in 2027.  She was seen by Dr. Luna and then the cancer care center in the past for erythrocytosis elevated hematocrit.  Pneumovax 7/2015, Prevnar-13 2016, Zostavax 7/2017, and Covid vaccine 2021.      Component      Latest Ref Rng & Units 4/8/2019 10/15/2019 11/25/2019 11/23/2020   Total Cholesterol      0 - 200 mg/dL 248 (H) 257 (H) 214 (H) 196   Triglycerides      0 - 150 mg/dL 244 (H) 152 (H) 262 (H) 279 (H)   HDL Cholesterol      40 - 60 mg/dL 38 (L) 45 38 (L) 35 (L)   LDL Cholesterol      0 - 100 mg/dL 161 (H) 182 (H) 124 (H) 105 (H)   VLDL Cholesterol      5 - 40 mg/dL 49 (H) 30 52 (H) 56 (H)   LDL/HDL Ratio               Component      Latest Ref Rng & Units 2/24/2021 6/2/2021 9/7/2021   Total Cholesterol      0 - 200 mg/dL 208 (H) 211 (H) 145   Triglycerides      0 - 150 mg/dL 175 (H) 211 (H) 187 (H)   HDL Cholesterol      40 - 60 mg/dL 40 38 (L) 36 (L)   LDL Cholesterol      0 - 100 mg/dL 133 (H) 131 (H) 77  "  VLDL Cholesterol      5 - 40 mg/dL 35 42 (H) 32   LDL/HDL Ratio         1.99       Objective   Vital Signs:   /68 (BP Location: Right arm, Patient Position: Sitting, Cuff Size: Adult)   Pulse 76   Temp 97.2 °F (36.2 °C) (Temporal)   Ht 160 cm (63\")   Wt 54.3 kg (119 lb 9.6 oz)   SpO2 98%   BMI 21.19 kg/m²       Physical Exam :  General: Well nourished, well hydrated, in no acute distress  HEENT: Conjunctiva clear, no exudate bilateral  Neck: Supple, non-tender, no adenopathy, no bruit  Skin: Warm and dry  Cardiovascular: S1 S2, regular rate and rhythm, no murmur  Respiratory: Diminished to auscultation bilateral, no wheezes, rhonchi, or rales  Chest: Normal shape, no deformity, normal work of breathing  Extremities: No lower extremity edema  Neurological: Alert, conversing normally  Psychological: Good eye contact, mood good, and judgment intact        Assessment and Plan:  Diagnoses and all orders for this visit:    1. Moderate mixed hyperlipidemia not requiring statin therapy (Primary)  Comments:  Improved. Started Lipitor 3 months ago. We will continue current treatment plan.  Orders:  -     Comprehensive Metabolic Panel; Future  -     CBC & Differential; Future  -     Lipid Panel; Future    2. Hypothyroidism, unspecified type  Comments:  Stable to continue current medication and will monitor.  Orders:  -     T4, Free; Future  -     TSH; Future  -     levothyroxine (SYNTHROID, LEVOTHROID) 25 MCG tablet; Take 1 tablet by mouth Every Morning for 90 days.  Dispense: 90 tablet; Refill: 1    3. Vitamin D deficiency  Comments:  Will monitor.  Orders:  -     Vitamin D 25 Hydroxy; Future    4. Current smoker    Other orders  -     Cancel:  CT Chest Low Dose Cancer Screening WO; Future      Follow Up:  Patient was given instructions and counseling regarding condition. Return in about 6 months (around 3/20/2022).   "

## 2021-09-20 NOTE — PATIENT INSTRUCTIONS
Co-Enzyme Q10 oral dosage forms  What is this medicine?  CO-ENZYME Q10 (koh EN zahym Q10) is an herbal or dietary supplement. It is promoted to help many disorders including congestive heart failure, certain mitochondrial diseases, migraine, and high blood pressure. The FDA has not approved this supplement for any medical use.  This supplement may be used for other purposes; ask your health care provider or pharmacist if you have questions.  This medicine may be used for other purposes; ask your health care provider or pharmacist if you have questions.  COMMON BRAND NAME(S): Co-Enzyme Q10, Q-Sorb Co Q-10, QuinZyme  What should I tell my health care provider before I take this medicine?  They need to know if you have any of these conditions:  · an unusual or allergic reaction to co-enzyme Q10, other herbs, plants, medicines, foods, dyes, or preservatives  · pregnant or trying to get pregnant  · breast-feeding  How should I use this medicine?  Take this medicine by mouth with a glass of water. Follow the directions on the package labeling, or take as directed by your health care professional. You should take this medicine with a high-fat meal. Do not take this medicine more often than directed.  Contact your pediatrician regarding the use of this medicine in children. Special care may be needed.  Overdosage: If you think you have taken too much of this medicine contact a poison control center or emergency room at once.  NOTE: This medicine is only for you. Do not share this medicine with others.  What if I miss a dose?  If you miss a dose, take it as soon as you can. If it is almost time for your next dose, take only that dose. Do not take double or extra doses.  What may interact with this medicine?  · medicines for blood pressure  · warfarin  This list may not describe all possible interactions. Give your health care provider a list of all the medicines, herbs, non-prescription drugs, or dietary supplements you use.  Also tell them if you smoke, drink alcohol, or use illegal drugs. Some items may interact with your medicine.  What should I watch for while using this medicine?  See your doctor if your symptoms do not get better or if they get worse.  Herbal or dietary supplements are not regulated like medicines. Rigid  standards are not required for dietary supplements. The purity and strength of these products can vary. The safety and effect of this dietary supplement for a certain disease or illness is not well known. This product is not intended to diagnose, treat, cure or prevent any disease.  The Food and Drug Administration suggests the following to help consumers protect themselves:  · Always read product labels and follow directions.  · Natural does not mean a product is safe for humans to take.  · Look for products that include USP after the ingredient name. This means that the  followed the standards of the [place] Pharmacopoeia.  · Supplements made or sold by a nationally known food or drug company are more likely to be made under tight controls. You can write to the company for more information about how the product was made.  What side effects may I notice from receiving this medicine?  Side effects that you should report to your doctor or health care professional as soon as possible:  · allergic reactions like skin rash, itching or hives, swelling of the face, lips, or tongue  Side effects that usually do not require medical attention (report to your doctor or health care professional if they continue or are bothersome):  · diarrhea  · loss of appetite  · nausea  · trouble sleeping  · upset stomach  This list may not describe all possible side effects. Call your doctor for medical advice about side effects. You may report side effects to FDA at 6-649-RPO-4263.  Where should I keep my medicine?  Keep out of the reach of children.  Store at room temperature or as directed on the package label.  Throw away any unused medicine after the expiration date.  NOTE: This sheet is a summary. It may not cover all possible information. If you have questions about this medicine, talk to your doctor, pharmacist, or health care provider.  © 2021 [employer]/Gold Standard (2017-01-18 16:28:05)

## 2021-10-13 RX ORDER — MONTELUKAST SODIUM 4 MG/1
1 TABLET, CHEWABLE ORAL 2 TIMES DAILY
Qty: 90 TABLET | Refills: 0 | Status: SHIPPED | OUTPATIENT
Start: 2021-10-13 | End: 2022-01-24

## 2021-10-13 NOTE — TELEPHONE ENCOUNTER
The pt doesn't see GI dr anymore. She hasnt for awhile. And the GI problems had stopped. She said they just flaired back up and she thought it would be easier to get a refill from you than the GI .

## 2021-12-06 RX ORDER — ATORVASTATIN CALCIUM 10 MG/1
10 TABLET, FILM COATED ORAL DAILY
Qty: 30 TABLET | Refills: 0 | Status: SHIPPED | OUTPATIENT
Start: 2021-12-06 | End: 2021-12-20 | Stop reason: SDUPTHER

## 2021-12-10 ENCOUNTER — LAB (OUTPATIENT)
Dept: LAB | Facility: HOSPITAL | Age: 71
End: 2021-12-10

## 2021-12-10 DIAGNOSIS — E03.9 HYPOTHYROIDISM, UNSPECIFIED TYPE: Chronic | ICD-10-CM

## 2021-12-10 DIAGNOSIS — E55.9 VITAMIN D DEFICIENCY: ICD-10-CM

## 2021-12-10 DIAGNOSIS — E78.2 MODERATE MIXED HYPERLIPIDEMIA NOT REQUIRING STATIN THERAPY: ICD-10-CM

## 2021-12-10 LAB
25(OH)D3 SERPL-MCNC: 68.4 NG/ML
ALBUMIN SERPL-MCNC: 4.3 G/DL (ref 3.5–5.2)
ALBUMIN/GLOB SERPL: 1.3 G/DL
ALP SERPL-CCNC: 123 U/L (ref 39–117)
ALT SERPL W P-5'-P-CCNC: 11 U/L (ref 1–33)
ANION GAP SERPL CALCULATED.3IONS-SCNC: 7.5 MMOL/L (ref 5–15)
AST SERPL-CCNC: 18 U/L (ref 1–32)
BASOPHILS # BLD AUTO: 0.03 10*3/MM3 (ref 0–0.2)
BASOPHILS NFR BLD AUTO: 0.4 % (ref 0–1.5)
BILIRUB SERPL-MCNC: 0.4 MG/DL (ref 0–1.2)
BUN SERPL-MCNC: 14 MG/DL (ref 8–23)
BUN/CREAT SERPL: 16.1 (ref 7–25)
CALCIUM SPEC-SCNC: 9.9 MG/DL (ref 8.6–10.5)
CHLORIDE SERPL-SCNC: 103 MMOL/L (ref 98–107)
CHOLEST SERPL-MCNC: 138 MG/DL (ref 0–200)
CO2 SERPL-SCNC: 27.5 MMOL/L (ref 22–29)
CREAT SERPL-MCNC: 0.87 MG/DL (ref 0.57–1)
DEPRECATED RDW RBC AUTO: 48.3 FL (ref 37–54)
EOSINOPHIL # BLD AUTO: 0.22 10*3/MM3 (ref 0–0.4)
EOSINOPHIL NFR BLD AUTO: 3.3 % (ref 0.3–6.2)
ERYTHROCYTE [DISTWIDTH] IN BLOOD BY AUTOMATED COUNT: 13 % (ref 12.3–15.4)
GFR SERPL CREATININE-BSD FRML MDRD: 64 ML/MIN/1.73
GLOBULIN UR ELPH-MCNC: 3.4 GM/DL
GLUCOSE SERPL-MCNC: 83 MG/DL (ref 65–99)
HCT VFR BLD AUTO: 48.4 % (ref 34–46.6)
HDLC SERPL-MCNC: 36 MG/DL (ref 40–60)
HGB BLD-MCNC: 16.4 G/DL (ref 12–15.9)
IMM GRANULOCYTES # BLD AUTO: 0.02 10*3/MM3 (ref 0–0.05)
IMM GRANULOCYTES NFR BLD AUTO: 0.3 % (ref 0–0.5)
LDLC SERPL CALC-MCNC: 74 MG/DL (ref 0–100)
LDLC/HDLC SERPL: 1.93 {RATIO}
LYMPHOCYTES # BLD AUTO: 1.63 10*3/MM3 (ref 0.7–3.1)
LYMPHOCYTES NFR BLD AUTO: 24.1 % (ref 19.6–45.3)
MCH RBC QN AUTO: 33.9 PG (ref 26.6–33)
MCHC RBC AUTO-ENTMCNC: 33.9 G/DL (ref 31.5–35.7)
MCV RBC AUTO: 100 FL (ref 79–97)
MONOCYTES # BLD AUTO: 0.45 10*3/MM3 (ref 0.1–0.9)
MONOCYTES NFR BLD AUTO: 6.7 % (ref 5–12)
NEUTROPHILS NFR BLD AUTO: 4.4 10*3/MM3 (ref 1.7–7)
NEUTROPHILS NFR BLD AUTO: 65.2 % (ref 42.7–76)
NRBC BLD AUTO-RTO: 0 /100 WBC (ref 0–0.2)
PLATELET # BLD AUTO: 208 10*3/MM3 (ref 140–450)
PMV BLD AUTO: 10.7 FL (ref 6–12)
POTASSIUM SERPL-SCNC: 4.1 MMOL/L (ref 3.5–5.2)
PROT SERPL-MCNC: 7.7 G/DL (ref 6–8.5)
RBC # BLD AUTO: 4.84 10*6/MM3 (ref 3.77–5.28)
SODIUM SERPL-SCNC: 138 MMOL/L (ref 136–145)
T4 FREE SERPL-MCNC: 1.25 NG/DL (ref 0.93–1.7)
TRIGL SERPL-MCNC: 163 MG/DL (ref 0–150)
TSH SERPL DL<=0.05 MIU/L-ACNC: 2.42 UIU/ML (ref 0.27–4.2)
VLDLC SERPL-MCNC: 28 MG/DL (ref 5–40)
WBC NRBC COR # BLD: 6.75 10*3/MM3 (ref 3.4–10.8)

## 2021-12-10 PROCEDURE — 84443 ASSAY THYROID STIM HORMONE: CPT

## 2021-12-10 PROCEDURE — 36415 COLL VENOUS BLD VENIPUNCTURE: CPT

## 2021-12-10 PROCEDURE — 80061 LIPID PANEL: CPT

## 2021-12-10 PROCEDURE — 84439 ASSAY OF FREE THYROXINE: CPT

## 2021-12-10 PROCEDURE — 82306 VITAMIN D 25 HYDROXY: CPT

## 2021-12-10 PROCEDURE — 85025 COMPLETE CBC W/AUTO DIFF WBC: CPT

## 2021-12-10 PROCEDURE — 80053 COMPREHEN METABOLIC PANEL: CPT

## 2021-12-15 ENCOUNTER — TELEPHONE (OUTPATIENT)
Dept: ONCOLOGY | Facility: HOSPITAL | Age: 71
End: 2021-12-15

## 2021-12-15 NOTE — TELEPHONE ENCOUNTER
CALLER: YAZMIN FARMER    CALL REGARDING: WANTED TO MAKE SURE APPT 12/22 WAS CANCELLED    VERIFIED APPT WITH [facility] CANCELLED FOR 12/22

## 2021-12-20 ENCOUNTER — OFFICE VISIT (OUTPATIENT)
Dept: INTERNAL MEDICINE | Facility: CLINIC | Age: 71
End: 2021-12-20

## 2021-12-20 VITALS
TEMPERATURE: 99 F | WEIGHT: 119.2 LBS | SYSTOLIC BLOOD PRESSURE: 119 MMHG | HEIGHT: 63 IN | OXYGEN SATURATION: 98 % | BODY MASS INDEX: 21.12 KG/M2 | DIASTOLIC BLOOD PRESSURE: 71 MMHG | HEART RATE: 90 BPM

## 2021-12-20 DIAGNOSIS — Z11.59 NEED FOR HEPATITIS C SCREENING TEST: ICD-10-CM

## 2021-12-20 DIAGNOSIS — R10.9 ABDOMINAL SPASMS: ICD-10-CM

## 2021-12-20 DIAGNOSIS — E03.9 HYPOTHYROIDISM, UNSPECIFIED TYPE: Primary | ICD-10-CM

## 2021-12-20 DIAGNOSIS — E78.2 MODERATE MIXED HYPERLIPIDEMIA NOT REQUIRING STATIN THERAPY: ICD-10-CM

## 2021-12-20 DIAGNOSIS — E55.9 VITAMIN D DEFICIENCY: ICD-10-CM

## 2021-12-20 PROBLEM — F17.200 CURRENT SMOKER: Status: ACTIVE | Noted: 2021-12-20

## 2021-12-20 PROBLEM — R19.7 DIARRHEA: Status: ACTIVE | Noted: 2021-12-20

## 2021-12-20 PROCEDURE — 99214 OFFICE O/P EST MOD 30 MIN: CPT | Performed by: NURSE PRACTITIONER

## 2021-12-20 RX ORDER — LEVOTHYROXINE SODIUM 0.03 MG/1
25 TABLET ORAL
Qty: 90 TABLET | Refills: 1 | Status: SHIPPED | OUTPATIENT
Start: 2021-12-20 | End: 2022-03-24

## 2021-12-20 RX ORDER — MULTIPLE VITAMINS W/ MINERALS TAB 9MG-400MCG
2 TAB ORAL DAILY
COMMUNITY

## 2021-12-20 RX ORDER — MELATONIN
1000 DAILY
COMMUNITY

## 2021-12-20 RX ORDER — ATORVASTATIN CALCIUM 10 MG/1
10 TABLET, FILM COATED ORAL DAILY
Qty: 90 TABLET | Refills: 1 | Status: SHIPPED | OUTPATIENT
Start: 2021-12-20 | End: 2021-12-29

## 2021-12-20 NOTE — PROGRESS NOTES
"Chief Complaint  Follow-up (labs done. ) and Hip Pain (pt complains of hip pain in both hips. )  Subjective        History of Present Illness  Samira Kramer is a 71 y.o. female who presents to [facility] for follow-up of hyperlipidemia and thyroid disease.  The patient is not having any medication side effects. Recent labs were reviewed. Eye specialist put her on a vitamin for macular degeneration.  Denies chest pain, shortness of air, abdominal pain, or change in bowel habits. She is complaining of bilateral hip pain for 2 weeks. The pain is located above the hip bones and radiates upward and into the back. Denies injury or fall. The pain is described as intermittent and sharp. She takes tylenol without relief. She takes a \"stomach pill\" it goes away.  She has history of IBS, diarrhea, and abdominal spasms.  She is followed by Dr. Barnes for chronic diarrhea.    Past Medical History:   Diagnosis Date   • Dysphagia following cerebral infarction    • Dysphagia, unspecified    • Gastro-esophageal reflux disease without esophagitis    • Hyperlipidemia, unspecified    • Hypothyroidism, unspecified    • Macular degeneration 2021   • Tick-borne relapsing fever    • Vitamin D deficiency, unspecified         Past Surgical History:   Procedure Laterality Date   • COLONOSCOPY  07/2017, 12/2018    per Dr. Hernandez, normal, due in 2027   • URETEROSCOPY  02/2018    SCOPE-BLADDER AND KIDNEYS        Allergies   Allergen Reactions   • Aleve [Naproxen] Unknown - High Severity   • Penicillins Unknown - High Severity   • Zetia [Ezetimibe] Rash        Current Outpatient Medications:   •  Ascorbic Acid (Vitamin C) 500 MG/5ML liquid, Daily., Disp: , Rfl:   •  atorvastatin (LIPITOR) 10 MG tablet, Take 1 tablet by mouth Daily. FOR 30 DAYS, Disp: 90 tablet, Rfl: 1  •  cholecalciferol (VITAMIN D3) 25 MCG (1000 UT) tablet, Take 1,000 Units by mouth Daily., Disp: , Rfl:   •  coenzyme Q10 100 MG " "capsule, Take 100 mg by mouth Daily., Disp: , Rfl:   •  colestipol (COLESTID) 1 g tablet, Take 1 tablet by mouth 2 (Two) Times a Day., Disp: 90 tablet, Rfl: 0  •  desonide (DESOWEN) 0.05 % cream, desonide 0.05 % topical cream apply sparingly and rub gently into the affected area(s) by topical route 2 times per day   Suspended, Disp: , Rfl:   •  GARLIC PO, garlic 1,000 mg oral capsule take 1 capsule by oral route daily for 30 days   Active, Disp: , Rfl:   •  levothyroxine (SYNTHROID, LEVOTHROID) 25 MCG tablet, Take 1 tablet by mouth Every Morning for 90 days., Disp: 90 tablet, Rfl: 1  •  loperamide (IMODIUM A-D) 2 MG tablet, , Disp: , Rfl:   •  multivitamin with minerals tablet tablet, Take 2 tablets by mouth Daily. For eyes/macular degeneration, Disp: , Rfl:   •  vitamin B-12 (CYANOCOBALAMIN) 100 MCG tablet, vitamin B12-folic acid 500-400 mcg oral tablet take 1 tablet by oral route daily   Active, Disp: , Rfl:   •  Zinc 10 MG lozenge, , Disp: , Rfl:     Objective   /71 (BP Location: Right arm, Patient Position: Sitting, Cuff Size: Adult)   Pulse 90   Temp 99 °F (37.2 °C) (Temporal)   Ht 160 cm (63\")   Wt 54.1 kg (119 lb 3.2 oz)   SpO2 98%   BMI 21.12 kg/m²    Estimated body mass index is 21.12 kg/m² as calculated from the following:    Height as of this encounter: 160 cm (63\").    Weight as of this encounter: 54.1 kg (119 lb 3.2 oz).   Physical Exam  Vitals reviewed.   Constitutional:       General: She is not in acute distress.     Appearance: Normal appearance.   HENT:      Head: Normocephalic and atraumatic.   Eyes:      Conjunctiva/sclera: Conjunctivae normal.   Neck:      Comments: No thyroid enlargement  Cardiovascular:      Rate and Rhythm: Normal rate and regular rhythm.      Heart sounds: Normal heart sounds.   Pulmonary:      Effort: Pulmonary effort is normal.      Breath sounds: Normal breath sounds. No wheezing, rhonchi or rales.   Abdominal:      General: Abdomen is flat.      Palpations: " Abdomen is soft. There is no mass.      Tenderness: There is no abdominal tenderness.   Musculoskeletal:      Cervical back: Neck supple.      Right lower leg: No edema.      Left lower leg: No edema.   Lymphadenopathy:      Cervical: No cervical adenopathy.   Skin:     General: Skin is warm and dry.   Neurological:      General: No focal deficit present.      Mental Status: She is alert.   Psychiatric:         Mood and Affect: Mood normal.         Behavior: Behavior normal.         Thought Content: Thought content normal.         Judgment: Judgment normal.        Result Review :   The following data was reviewed by: MICHELLE Aguayo on 12/20/2021:  Common labs    Common Labsle 6/2/21 6/2/21 6/2/21 9/7/21 9/7/21 12/10/21 12/10/21 12/10/21    1457 1457 1457 1206 1206 1311 1311 1311   Glucose  83  88  83     BUN  8  10  14     Creatinine  0.86  0.97  0.87     eGFR Non  Am    57 (A)  64     Sodium  136  140  138     Potassium  4.1  4.3  4.1     Chloride  102  105  103     Calcium  9.8  9.7  9.9     Albumin  4.2  4.30  4.30     Total Bilirubin  0.36  0.3  0.4     Alkaline Phosphatase  90  134 (A)  123 (A)     AST (SGOT)  22  20  18     ALT (SGPT)  13  16  11     WBC 6.84       6.75   Hemoglobin 16.2 (A)       16.4 (A)   Hematocrit 48.5 (A)       48.4 (A)   Platelets 213       208   Total Cholesterol     145  138    Total Cholesterol   211 (A)        Triglycerides   211 (A)  187 (A)  163 (A)    HDL Cholesterol   38 (A)  36 (A)  36 (A)    LDL Cholesterol    131 (A)  77  74    (A) Abnormal value       Comments are available for some flowsheets but are not being displayed.         Vitamin D 25 Hydroxy (12/10/2021 13:11)       Assessment and Plan    Diagnoses and all orders for this visit:    1. Hypothyroidism, unspecified type (Primary)  Comments:  Stable and to continue current medication. Recheck labs next visit.  Orders:  -     levothyroxine (SYNTHROID, LEVOTHROID) 25 MCG tablet; Take 1 tablet by mouth Every  Morning for 90 days.  Dispense: 90 tablet; Refill: 1  -     Comprehensive Metabolic Panel; Future  -     T4, Free; Future  -     TSH; Future    2. Moderate mixed hyperlipidemia not requiring statin therapy  Comments:  Improved and to continue current medication and treatment plan.  Orders:  -     atorvastatin (LIPITOR) 10 MG tablet; Take 1 tablet by mouth Daily. FOR 30 DAYS  Dispense: 90 tablet; Refill: 1  -     Comprehensive Metabolic Panel; Future  -     Lipid Panel; Future    3. Abdominal spasms  Comments:  Continue taking medication for abdominal spasms as directed.  If worsening recommend following up with GI.    4. Vitamin D deficiency  Comments:  Stable and to continue current treatment plan with supplement and monitor.  Orders:  -     Vitamin D 25 Hydroxy; Future    5. Need for hepatitis C screening test  -     Cancel: Hepatitis C Antibody; Future  -     Hepatitis C Antibody; Future      Follow Up     Patient was given instructions and counseling regarding her condition or for health maintenance advice. Please see specific information pulled into the AVS if appropriate.   Return in about 10 months (around 11/1/2022) for Recheck.    MICHELLE Aguayo

## 2021-12-29 DIAGNOSIS — E78.2 MODERATE MIXED HYPERLIPIDEMIA NOT REQUIRING STATIN THERAPY: ICD-10-CM

## 2021-12-29 RX ORDER — ATORVASTATIN CALCIUM 10 MG/1
TABLET, FILM COATED ORAL
Qty: 30 TABLET | Refills: 5 | Status: SHIPPED | OUTPATIENT
Start: 2021-12-29 | End: 2022-05-02

## 2022-01-24 RX ORDER — MONTELUKAST SODIUM 4 MG/1
TABLET, CHEWABLE ORAL
Qty: 180 TABLET | Refills: 0 | Status: SHIPPED | OUTPATIENT
Start: 2022-01-24 | End: 2022-05-04

## 2022-01-27 ENCOUNTER — TELEPHONE (OUTPATIENT)
Dept: INTERNAL MEDICINE | Facility: CLINIC | Age: 72
End: 2022-01-27

## 2022-01-27 NOTE — TELEPHONE ENCOUNTER
Caller: Samira Kramer    Relationship to patient: Self    Best call back number: [phone]     Patient is needing: PATIENT WOULD LIKE TO HAVE A REFERRAL TO [facility] CHIR[facility]RACTIC. SHE SAID IN ORDER FOR INSURANCE TO PAY SHE NEEDS A REFERRAL. PLEASE CALL PATIENT AND ADVISE.

## 2022-01-31 NOTE — TELEPHONE ENCOUNTER
Called and LM with the pt . He voiced understanding. And stated the pt is going to go to her chiropractor she's been going to.

## 2022-02-24 NOTE — TELEPHONE ENCOUNTER
[facility]    OPERATIVE REPORT    NAME: Edgardo Naylor    AGE: 45 y.o. YOB: 1970    MEDICAL RECORD NUMBER: [id number]    DATE OF SURGERY: 2/24/2022    PREOPERATIVE DIAGNOSIS: Abnormal bleeding with endometrial lesion    POSTOPERATIVE DIAGNOSIS: Abnormal bleeding with endometrial lesion    OPERATIVE PROCEDURE:   1. Hysteroscopy  2. Dilitation and curettage  3. Myosure excision of endometrial lesion  4. Novasure endometrial ablation    SURGEON: Margarita Steinberg III, MD    ASSISTANT:  staff    ANESTHESIA: General    IMPLANTS:  None    COMPLICATIONS: None     ESTIMATED BLOOD LOSS: minimal intraoperatively    INDICATION FOR PROCEDURE: This is a 45 y.o. female with a history of abnormal bleeding. Ultrasound showed a possible endometrial lesion. She presents for endometrial  evaluation with hysteroscopy and D&C with excision of lesion with Myosure with possible endometrial ablation. Informed consent was obtained and the patient stated she has no other questions. PROCEDURE:  The patient was prepped and draped in the dorsal lithotomy position after institution of general anesthesia. The uterus was anteverted without flexion and not very mobile, so the cervix was angled toward the rectum. The cervix was grasped with two Allis clamps, it was easily sounded to 10.5 cm. The endocervix was 4 cm. The cervix was dilated and then the Myosure hysteroscope was introduced. The endometrial cavity was inspected. There was thick, lush endometrium throughout the cavity. The endometrium was treated with the Myosure excision device. A large amount of tissue was removed with no sign of complication. The endometrial cavity was fully viewed and all the quadrants were clear, there were no other lesions, and there was no sign of perforation. Attention was turned to the ablation. The Arlet device was deployed at 6.5 cm.   Because of the angle it was difficult to get a good seal with Pt called and informed.    two syringes of air filling the endocervical balloon. It continued to not seal so the cervix was manipulated with Allis clamps. Somewhere in the process the balloon was damaged and would not hold air and seal.  This was the last one in the hospital so a Novasure device was then deployed. The Novasure device was set at 6.5 cm depth and inserted into the uterine cavity. The width achieved was 2.7 cm. This was consistent with the view of the cavity after myosure tissue removal.  Cavity integrity passed on the second attempt. The device was activated. Treatment cycle was 67 seconds. There were no problems. The device was removed from the endometrial cavity and the scope re-inserted. All quadrants were fully treated and there was no bleeding from any surface. The scope was removed. The procedure was terminated. Total blood loss was negligible. Fluid retained was approximately 450 cc. The patient was awakened and taken to the recovery room in good condition.     Signed By:  Julianne Dotson MD     February 24, 2022

## 2022-03-24 DIAGNOSIS — E03.9 HYPOTHYROIDISM, UNSPECIFIED TYPE: ICD-10-CM

## 2022-03-24 RX ORDER — LEVOTHYROXINE SODIUM 25 UG/1
TABLET ORAL
Qty: 90 TABLET | Refills: 1 | Status: SHIPPED | OUTPATIENT
Start: 2022-03-24 | End: 2022-08-23 | Stop reason: SDUPTHER

## 2022-04-28 ENCOUNTER — TELEPHONE (OUTPATIENT)
Dept: INTERNAL MEDICINE | Facility: CLINIC | Age: 72
End: 2022-04-28

## 2022-05-02 NOTE — TELEPHONE ENCOUNTER
Called pt and she said that she cant take anything with fish oil because it makes her diarrhea way worse than it already. Pt stated shes taking the colestipol and that that's helping. I also updated her med list

## 2022-05-04 RX ORDER — MONTELUKAST SODIUM 4 MG/1
TABLET, CHEWABLE ORAL
Qty: 90 TABLET | Refills: 0 | Status: SHIPPED | OUTPATIENT
Start: 2022-05-04 | End: 2022-06-16 | Stop reason: SDUPTHER

## 2022-06-16 RX ORDER — MONTELUKAST SODIUM 4 MG/1
1 TABLET, CHEWABLE ORAL 2 TIMES DAILY
Qty: 90 TABLET | Refills: 0 | Status: SHIPPED | OUTPATIENT
Start: 2022-06-16 | End: 2022-08-23 | Stop reason: SDUPTHER

## 2022-06-16 NOTE — TELEPHONE ENCOUNTER
Caller: Samira Kramer    Relationship: Self    Best call back number: [phone]    Requested Prescriptions:   Requested Prescriptions     Pending Prescriptions Disp Refills   • colestipol (COLESTID) 1 g tablet 90 tablet 0     Sig: Take 1 tablet by mouth 2 (Two) Times a Day.        Pharmacy where request should be sent: [facility] [address] [place] [address] [phone] [facility] [phone]      Additional details provided by patient: PATIENT WOULD LIKE THIS PRESCRIPTION TO INCLUDE REFILLS WHEN SENT    Does the patient have less than a 3 day supply:  [x] Yes  [] No    Yi Brand Rep   06/16/22 11:13 EDT

## 2022-08-23 ENCOUNTER — OFFICE VISIT (OUTPATIENT)
Dept: FAMILY MEDICINE CLINIC | Facility: CLINIC | Age: 72
End: 2022-08-23

## 2022-08-23 VITALS
OXYGEN SATURATION: 100 % | TEMPERATURE: 97.8 F | DIASTOLIC BLOOD PRESSURE: 72 MMHG | HEART RATE: 67 BPM | WEIGHT: 112 LBS | SYSTOLIC BLOOD PRESSURE: 120 MMHG | BODY MASS INDEX: 19.84 KG/M2 | HEIGHT: 63 IN

## 2022-08-23 DIAGNOSIS — K21.9 GASTRO-ESOPHAGEAL REFLUX DISEASE WITHOUT ESOPHAGITIS: ICD-10-CM

## 2022-08-23 DIAGNOSIS — Z11.59 NEED FOR HEPATITIS C SCREENING TEST: ICD-10-CM

## 2022-08-23 DIAGNOSIS — Z87.891 HISTORY OF SMOKING 25-50 PACK YEARS: ICD-10-CM

## 2022-08-23 DIAGNOSIS — F17.200 CURRENT SMOKER: ICD-10-CM

## 2022-08-23 DIAGNOSIS — H61.91 LESION OF SKIN OF RIGHT EAR: ICD-10-CM

## 2022-08-23 DIAGNOSIS — E03.9 ACQUIRED HYPOTHYROIDISM: Primary | ICD-10-CM

## 2022-08-23 DIAGNOSIS — M99.03 SEGMENTAL AND SOMATIC DYSFUNCTION OF LUMBAR REGION: ICD-10-CM

## 2022-08-23 DIAGNOSIS — R19.7 DIARRHEA, UNSPECIFIED TYPE: ICD-10-CM

## 2022-08-23 DIAGNOSIS — E78.2 MIXED HYPERLIPIDEMIA: ICD-10-CM

## 2022-08-23 PROBLEM — Z78.9 STATIN INTOLERANCE: Status: ACTIVE | Noted: 2022-08-23

## 2022-08-23 LAB
ALBUMIN SERPL-MCNC: 4.4 G/DL (ref 3.5–5.2)
ALBUMIN/GLOB SERPL: 1.4 G/DL
ALP SERPL-CCNC: 105 U/L (ref 39–117)
ALT SERPL W P-5'-P-CCNC: 12 U/L (ref 1–33)
ANION GAP SERPL CALCULATED.3IONS-SCNC: 10.4 MMOL/L (ref 5–15)
AST SERPL-CCNC: 24 U/L (ref 1–32)
BASOPHILS # BLD AUTO: 0.02 10*3/MM3 (ref 0–0.2)
BASOPHILS NFR BLD AUTO: 0.3 % (ref 0–1.5)
BILIRUB SERPL-MCNC: 0.4 MG/DL (ref 0–1.2)
BUN SERPL-MCNC: 11 MG/DL (ref 8–23)
BUN/CREAT SERPL: 11.1 (ref 7–25)
CALCIUM SPEC-SCNC: 9.8 MG/DL (ref 8.6–10.5)
CHLORIDE SERPL-SCNC: 104 MMOL/L (ref 98–107)
CHOLEST SERPL-MCNC: 214 MG/DL (ref 0–200)
CO2 SERPL-SCNC: 26.6 MMOL/L (ref 22–29)
CREAT SERPL-MCNC: 0.99 MG/DL (ref 0.57–1)
DEPRECATED RDW RBC AUTO: 46.2 FL (ref 37–54)
EGFRCR SERPLBLD CKD-EPI 2021: 60.7 ML/MIN/1.73
EOSINOPHIL # BLD AUTO: 0.21 10*3/MM3 (ref 0–0.4)
EOSINOPHIL NFR BLD AUTO: 3.3 % (ref 0.3–6.2)
ERYTHROCYTE [DISTWIDTH] IN BLOOD BY AUTOMATED COUNT: 12.9 % (ref 12.3–15.4)
GLOBULIN UR ELPH-MCNC: 3.1 GM/DL
GLUCOSE SERPL-MCNC: 81 MG/DL (ref 65–99)
HCT VFR BLD AUTO: 44.7 % (ref 34–46.6)
HCV AB SER DONR QL: NORMAL
HDLC SERPL-MCNC: 37 MG/DL (ref 40–60)
HGB BLD-MCNC: 15.6 G/DL (ref 12–15.9)
IMM GRANULOCYTES # BLD AUTO: 0.02 10*3/MM3 (ref 0–0.05)
IMM GRANULOCYTES NFR BLD AUTO: 0.3 % (ref 0–0.5)
LDLC SERPL CALC-MCNC: 136 MG/DL (ref 0–100)
LDLC/HDLC SERPL: 3.55 {RATIO}
LYMPHOCYTES # BLD AUTO: 1.66 10*3/MM3 (ref 0.7–3.1)
LYMPHOCYTES NFR BLD AUTO: 25.9 % (ref 19.6–45.3)
MCH RBC QN AUTO: 34.2 PG (ref 26.6–33)
MCHC RBC AUTO-ENTMCNC: 34.9 G/DL (ref 31.5–35.7)
MCV RBC AUTO: 98 FL (ref 79–97)
MONOCYTES # BLD AUTO: 0.42 10*3/MM3 (ref 0.1–0.9)
MONOCYTES NFR BLD AUTO: 6.6 % (ref 5–12)
NEUTROPHILS NFR BLD AUTO: 4.08 10*3/MM3 (ref 1.7–7)
NEUTROPHILS NFR BLD AUTO: 63.6 % (ref 42.7–76)
NRBC BLD AUTO-RTO: 0 /100 WBC (ref 0–0.2)
PLATELET # BLD AUTO: 203 10*3/MM3 (ref 140–450)
PMV BLD AUTO: 10.2 FL (ref 6–12)
POTASSIUM SERPL-SCNC: 4.1 MMOL/L (ref 3.5–5.2)
PROT SERPL-MCNC: 7.5 G/DL (ref 6–8.5)
RBC # BLD AUTO: 4.56 10*6/MM3 (ref 3.77–5.28)
SODIUM SERPL-SCNC: 141 MMOL/L (ref 136–145)
T3FREE SERPL-MCNC: 2.79 PG/ML (ref 2–4.4)
T4 FREE SERPL-MCNC: 1.13 NG/DL (ref 0.93–1.7)
TRIGL SERPL-MCNC: 229 MG/DL (ref 0–150)
TSH SERPL DL<=0.05 MIU/L-ACNC: 3.59 UIU/ML (ref 0.27–4.2)
VLDLC SERPL-MCNC: 41 MG/DL (ref 5–40)
WBC NRBC COR # BLD: 6.41 10*3/MM3 (ref 3.4–10.8)

## 2022-08-23 PROCEDURE — 84439 ASSAY OF FREE THYROXINE: CPT | Performed by: NURSE PRACTITIONER

## 2022-08-23 PROCEDURE — 86803 HEPATITIS C AB TEST: CPT | Performed by: NURSE PRACTITIONER

## 2022-08-23 PROCEDURE — 36415 COLL VENOUS BLD VENIPUNCTURE: CPT | Performed by: NURSE PRACTITIONER

## 2022-08-23 PROCEDURE — 84481 FREE ASSAY (FT-3): CPT | Performed by: NURSE PRACTITIONER

## 2022-08-23 PROCEDURE — 84443 ASSAY THYROID STIM HORMONE: CPT | Performed by: NURSE PRACTITIONER

## 2022-08-23 PROCEDURE — 85025 COMPLETE CBC W/AUTO DIFF WBC: CPT | Performed by: NURSE PRACTITIONER

## 2022-08-23 PROCEDURE — 99214 OFFICE O/P EST MOD 30 MIN: CPT | Performed by: NURSE PRACTITIONER

## 2022-08-23 PROCEDURE — 80053 COMPREHEN METABOLIC PANEL: CPT | Performed by: NURSE PRACTITIONER

## 2022-08-23 PROCEDURE — 80061 LIPID PANEL: CPT | Performed by: NURSE PRACTITIONER

## 2022-08-23 RX ORDER — LEVOTHYROXINE SODIUM 0.03 MG/1
25 TABLET ORAL
Qty: 90 TABLET | Refills: 1 | Status: SHIPPED | OUTPATIENT
Start: 2022-08-23 | End: 2022-09-29 | Stop reason: SDUPTHER

## 2022-08-23 RX ORDER — MONTELUKAST SODIUM 4 MG/1
1 TABLET, CHEWABLE ORAL 2 TIMES DAILY
Qty: 180 TABLET | Refills: 1 | Status: SHIPPED | OUTPATIENT
Start: 2022-08-23 | End: 2023-01-06 | Stop reason: SDUPTHER

## 2022-08-23 NOTE — PROGRESS NOTES
MARINO ANNE [[id number]]     New Patient Office Visit      Patient Name: Samira Kramer  : 1950   MRN: [id number]     Chief Complaint:    Chief Complaint   Patient presents with   • Establish Care   • Hyperlipidemia   • Hypothyroidism       History of Present Illness: Samira Kramer is a 71 y.o. female who is here today to establish care.      Last pcp- Callie Bentley- dissatisfied with care  Dr woods hem/oc     Med hx- hyperlipidemia, hypothyroidism, Vit D deficiency, GERD, chronic diarrhea, renal stones, chronic low back pain   Pt reports statins cause muscle pain   Sx hx- Ureteroscopy-   fam med hx- mother- cancer, father- cancer    Chiropractor dr yusef bazan     Mammogram - none pt declines   dexa- none pt declines   Labs- 2021    Colonoscopy-- Dr. Barnes 2018 colonoscopy  Ct low dose chest     C/o Patient has a growth on top of her right ear she noticed 6 months ago.  Requests referral to dermatology previously seen dermatology who just provided her cream the cream is not taking care of this growth She says they come and go and she can usually pick it off, but not this time.       Patient use to get yearly low dose chest CT's by Dr. Woods she has left practice   Smoking since age 19 1ppd declined smoking cessation    Subjective      Review of Systems:   Review of Systems   Constitutional: Negative for fever.   HENT: Negative for ear pain, sinus pain and sore throat.    Respiratory: Negative for shortness of breath.    Cardiovascular: Negative for chest pain.   Gastrointestinal: Negative for abdominal pain, diarrhea, nausea and vomiting.   Genitourinary: Negative for dysuria.   Musculoskeletal: Negative for myalgias.   Skin:        Skin lesion  On ear   Neurological: Negative for headaches.        Past Medical History:   Past Medical History:   Diagnosis Date   • Dysphagia following cerebral infarction    • Dysphagia, unspecified    • Gastro-esophageal reflux  disease without esophagitis    • Hyperlipidemia, unspecified    • Hypothyroidism, unspecified    • Macular degeneration 2021   • Tick-borne relapsing fever    • Vitamin D deficiency, unspecified        Past Surgical History:   Past Surgical History:   Procedure Laterality Date   • COLONOSCOPY  07/2017, 12/2018    per constantin Dobson, due in 2027   • URETEROSCOPY  02/2018    SCOPE-BLADDER AND KIDNEYS       Family History:   Family History   Problem Relation Age of Onset   • Cancer Mother         TYPE NOT SPECIFIED   • Cancer Father         TYPE NOT SPECIFIED       Social History:   Social History     Socioeconomic History   • Marital status:    Tobacco Use   • Smoking status: Former Smoker   • Smokeless tobacco: Never Used   Vaping Use   • Vaping Use: Never used   Substance and Sexual Activity   • Alcohol use: Never   • Drug use: Never   • Sexual activity: Defer       Medications:     Current Outpatient Medications:   •  Ascorbic Acid (Vitamin C) 500 MG/5ML liquid, Daily., Disp: , Rfl:   •  Calcium Polycarbophil (FIBER-CAPS PO), Take  by mouth., Disp: , Rfl:   •  cholecalciferol (VITAMIN D3) 25 MCG (1000 UT) tablet, Take 1,000 Units by mouth Daily., Disp: , Rfl:   •  coenzyme Q10 100 MG capsule, Take 100 mg by mouth Daily., Disp: , Rfl:   •  colestipol (COLESTID) 1 g tablet, Take 1 tablet by mouth 2 (Two) Times a Day., Disp: 180 tablet, Rfl: 1  •  GARLIC PO, garlic 1,000 mg oral capsule take 1 capsule by oral route daily for 30 days   Active, Disp: , Rfl:   •  levothyroxine (Euthyrox) 25 MCG tablet, Take 1 tablet by mouth Every Morning., Disp: 90 tablet, Rfl: 1  •  vitamin B-12 (CYANOCOBALAMIN) 100 MCG tablet, vitamin B12-folic acid 500-400 mcg oral tablet take 1 tablet by oral route daily   Active, Disp: , Rfl:   •  Zinc 10 MG lozenge, , Disp: , Rfl:   •  desonide (DESOWEN) 0.05 % cream, desonide 0.05 % topical cream apply sparingly and rub gently into the affected area(s) by topical route 2 times per day   " Suspended, Disp: , Rfl:   •  multivitamin with minerals tablet tablet, Take 2 tablets by mouth Daily. For eyes/macular degeneration, Disp: , Rfl:     Allergies:   Allergies   Allergen Reactions   • Aleve [Naproxen] Unknown - High Severity   • Atorvastatin Unknown - High Severity   • Omega 3-6-9 Fatty Acids Diarrhea   • Penicillins Unknown - High Severity   • Zetia [Ezetimibe] Rash       Objective     Physical Exam:  Vital Signs:   Vitals:    08/23/22 0829   BP: 120/72   Pulse: 67   Temp: 97.8 °F (36.6 °C)   SpO2: 100%   Weight: 50.8 kg (112 lb)   Height: 160 cm (63\")     Body mass index is 19.84 kg/m².     Physical Exam  HENT:      Right Ear: Tympanic membrane normal.      Left Ear: Tympanic membrane normal.      Nose: Nose normal.      Mouth/Throat:      Mouth: Mucous membranes are moist.   Eyes:      Conjunctiva/sclera: Conjunctivae normal.   Neck:      Vascular: No carotid bruit.   Cardiovascular:      Rate and Rhythm: Normal rate and regular rhythm.      Pulses: Normal pulses.      Heart sounds: Normal heart sounds.   Pulmonary:      Effort: Pulmonary effort is normal.      Breath sounds: Normal breath sounds.   Abdominal:      General: Bowel sounds are normal.      Palpations: Abdomen is soft.   Musculoskeletal:      Right lower leg: No edema.      Left lower leg: No edema.   Skin:     General: Skin is warm and dry.      Comments: Growth right ear helix,  non tender, no erythema, tan, scaly, with horse shoe growth extending out above growth    Neurological:      Mental Status: She is alert.   Psychiatric:         Mood and Affect: Mood normal.         Behavior: Behavior normal.             Assessment / Plan      Assessment/Plan:   Diagnoses and all orders for this visit:    1. Acquired hypothyroidism (Primary)  -     levothyroxine (Euthyrox) 25 MCG tablet; Take 1 tablet by mouth Every Morning.  Dispense: 90 tablet; Refill: 1  -     TSH  -     T3, Free  -     T4, Free    2. Mixed hyperlipidemia  -     " "Comprehensive Metabolic Panel  -     Lipid Panel    3. Diarrhea, unspecified type    4. Gastro-esophageal reflux disease without esophagitis    5. Current smoker  -     CBC Auto Differential  -     CT Chest Low Dose Wo; Future    6. History of smoking 25-50 pack years  -     CT Chest Low Dose Wo; Future    7. Segmental and somatic dysfunction of lumbar region  -     Ambulatory Referral to Chiropractic    8. Lesion of skin of right ear  -     Ambulatory Referral to Dermatology    9. Need for hepatitis C screening test  -     Hepatitis C Antibody    Other orders  -     colestipol (COLESTID) 1 g tablet; Take 1 tablet by mouth 2 (Two) Times a Day.  Dispense: 180 tablet; Refill: 1       Hypothyroidism will obtain labs to monitor current levothyroxine dose 25 mcg daily denies palpitations  Hyperlipidemia we will obtain lipid panel to monitor colestipol dose patient reports statin cause muscle pain  Diarrhea chronic currently on Colestid well managed at this time  Reflux controlled with diet at this time  History of smoking greater than 25 pack years tobacco abuse patient declines smoking cessation we will obtain low-dose CT of the chest  Dysfunction of lumbar region currently receiving chiropractic care requests referral with insurance states that will help a revisits  Lesion of right ear will refer to dermatology per patient request        Follow Up:   Return in about 1 month (around 9/26/2022).    Meera Dunlap, MICHELLE      \"Please note that portions of this note were completed with a voice recognition program.\"    "

## 2022-08-24 PROBLEM — Z87.891 HISTORY OF SMOKING 25-50 PACK YEARS: Status: ACTIVE | Noted: 2021-12-20

## 2022-08-24 PROBLEM — H61.91 LESION OF SKIN OF RIGHT EAR: Status: ACTIVE | Noted: 2022-08-24

## 2022-08-26 DIAGNOSIS — E78.2 MIXED HYPERLIPIDEMIA: Primary | ICD-10-CM

## 2022-08-29 ENCOUNTER — TELEPHONE (OUTPATIENT)
Dept: FAMILY MEDICINE CLINIC | Facility: CLINIC | Age: 72
End: 2022-08-29

## 2022-08-29 NOTE — TELEPHONE ENCOUNTER
Caller: Samira Kramer    Relationship: Self    Best call back number: [phone]    What is the best time to reach you: ANYTIME     Who are you requesting to speak with (clinical staff, provider,  specific staff member):  CLINICAL         What was the call regarding:    THE PATIENT SAID THAT THE PHARMACY ADVISED HER THAT HER  CHOLESTEROL  MEDICATION IS GOING TO COST HER $1000 A MONTH - USING [facility]. SHE SAID TO FORGET THAT MEDICATION, SHE IS NOT ABLE TO AFFORD IT. SHE SAID IT WAS WELL OVER A $1000 WITHOUT IT. SHE SAID  THE VERY FIRST MEDICATION SHE TOOK , SHE SAID IT WAS SEVERAL YEARS AGO. SHE IS NOT SURE OF THE NAMES OF THESE MEDICATIONS.        Do you require a callback: YES

## 2022-08-29 NOTE — TELEPHONE ENCOUNTER
----- Message from MICHELLE Albrecht sent at 8/26/2022  1:20 PM EDT -----  Cholesterol elevated need to decrease fried foods red meat pork products cheese increase fruits vegetables whole grains all other labs normal    The 10-year ASCVD risk score (Bishop LAURENT Jr., et al., 2013) is: 11%    Values used to calculate the score:      Age: 71 years      Sex: Female      Is Non- : No      Diabetic: No      Tobacco smoker: No      Systolic Blood Pressure: 120 mmHg      Is BP treated: No      HDL Cholesterol: 37 mg/dL      Total Cholesterol: 214 mg/dL  Would recommend starting nexlatol  Check CMP and lipid panel in 30 days follow-up appointment

## 2022-09-02 ENCOUNTER — HOSPITAL ENCOUNTER (OUTPATIENT)
Dept: CT IMAGING | Facility: HOSPITAL | Age: 72
Discharge: HOME | End: 2022-09-02
Admitting: NURSE PRACTITIONER

## 2022-09-02 DIAGNOSIS — F17.200 CURRENT SMOKER: ICD-10-CM

## 2022-09-02 DIAGNOSIS — Z87.891 HISTORY OF SMOKING 25-50 PACK YEARS: ICD-10-CM

## 2022-09-02 PROCEDURE — 71271 CT THORAX LUNG CANCER SCR C-: CPT

## 2022-09-06 ENCOUNTER — TELEPHONE (OUTPATIENT)
Dept: FAMILY MEDICINE CLINIC | Facility: CLINIC | Age: 72
End: 2022-09-06

## 2022-09-06 NOTE — TELEPHONE ENCOUNTER
Pt was approved for Nexletol but insurance would only cover $30 leaving pt with $600 to cover. I tried to enroll for savings card and pt care assistance program but they will not accept pt due to insurance. Could we try different medication?

## 2022-09-29 ENCOUNTER — OFFICE VISIT (OUTPATIENT)
Dept: FAMILY MEDICINE CLINIC | Facility: CLINIC | Age: 72
End: 2022-09-29

## 2022-09-29 VITALS
DIASTOLIC BLOOD PRESSURE: 57 MMHG | HEIGHT: 63 IN | WEIGHT: 115 LBS | TEMPERATURE: 96.6 F | HEART RATE: 69 BPM | BODY MASS INDEX: 20.38 KG/M2 | SYSTOLIC BLOOD PRESSURE: 114 MMHG | OXYGEN SATURATION: 100 %

## 2022-09-29 DIAGNOSIS — H61.23 BILATERAL IMPACTED CERUMEN: ICD-10-CM

## 2022-09-29 DIAGNOSIS — E78.2 MIXED HYPERLIPIDEMIA: ICD-10-CM

## 2022-09-29 DIAGNOSIS — E03.9 ACQUIRED HYPOTHYROIDISM: ICD-10-CM

## 2022-09-29 DIAGNOSIS — Z78.9 STATIN INTOLERANCE: ICD-10-CM

## 2022-09-29 DIAGNOSIS — R19.7 DIARRHEA, UNSPECIFIED TYPE: ICD-10-CM

## 2022-09-29 DIAGNOSIS — Z87.891 HISTORY OF SMOKING 25-50 PACK YEARS: ICD-10-CM

## 2022-09-29 DIAGNOSIS — Z72.0 TOBACCO ABUSE: ICD-10-CM

## 2022-09-29 DIAGNOSIS — K21.9 GASTRO-ESOPHAGEAL REFLUX DISEASE WITHOUT ESOPHAGITIS: ICD-10-CM

## 2022-09-29 PROCEDURE — 99214 OFFICE O/P EST MOD 30 MIN: CPT | Performed by: NURSE PRACTITIONER

## 2022-09-29 RX ORDER — LEVOTHYROXINE SODIUM 0.03 MG/1
25 TABLET ORAL
Qty: 90 TABLET | Refills: 1 | Status: SHIPPED | OUTPATIENT
Start: 2022-09-29

## 2022-09-29 RX ORDER — LIDOCAINE 50 MG/G
3 PATCH TOPICAL EVERY 24 HOURS
Qty: 90 EACH | Refills: 5 | Status: SHIPPED | OUTPATIENT
Start: 2022-09-29

## 2022-09-29 NOTE — PROGRESS NOTES
Follow Up Office Visit      Patient Name: Samira Kramer  : 1950   MRN: [id number]     Chief Complaint:    Chief Complaint   Patient presents with   • Hyperlipidemia   • Hypothyroidism   • Back Pain   • Diarrhea       History of Present Illness: Samira Kramer is a 71 y.o. female who is here today to follow up for hyperlipidemia, hypothyroidism, chronic low back pain, chronic diarrhea   Review lab results and CT of the chest results    mammo- declines  dexa- declines  Colonoscopy-   Ct low dose chest Lung rads category 2.  Annual low-dose screening CT recommended.      C/o Patient is stating her back started hurting yesterday afternoon. Patient denies any injury. Patient states she has chronic back pain.  Currently seeing chiropractor   Pt reports standing for long periods of time this week   Denies radiation pain   Denies loss of sensory or motor function     Also c/o muffled hearing, previous wax impacted in her ears     Subjective      Review of Systems:   Review of Systems   Constitutional: Negative for fatigue.   HENT: Negative for ear pain and sore throat.    Eyes: Negative for itching.   Respiratory: Negative for cough.    Cardiovascular: Negative for chest pain.   Gastrointestinal: Negative for abdominal pain, diarrhea, nausea and vomiting.   Genitourinary: Negative for dysuria.   Musculoskeletal: Positive for back pain.   Neurological: Negative for numbness.        Past Medical History:   Past Medical History:   Diagnosis Date   • Dysphagia following cerebral infarction    • Dysphagia, unspecified    • Gastro-esophageal reflux disease without esophagitis    • Hyperlipidemia, unspecified    • Hypothyroidism, unspecified    • Macular degeneration    • Tick-borne relapsing fever    • Vitamin D deficiency, unspecified        Past Surgical History:   Past Surgical History:   Procedure Laterality Date   • COLONOSCOPY  2017, 2018    per Dr. Hernandez, normal, due in     • URETEROSCOPY  02/2018    SCOPE-BLADDER AND KIDNEYS       Family History:   Family History   Problem Relation Age of Onset   • Cancer Mother         TYPE NOT SPECIFIED   • Cancer Father         TYPE NOT SPECIFIED       Social History:   Social History     Socioeconomic History   • Marital status:    Tobacco Use   • Smoking status: Former Smoker   • Smokeless tobacco: Never Used   Vaping Use   • Vaping Use: Never used   Substance and Sexual Activity   • Alcohol use: Never   • Drug use: Never   • Sexual activity: Defer       Medications:     Current Outpatient Medications:   •  Ascorbic Acid (Vitamin C) 500 MG/5ML liquid, Daily., Disp: , Rfl:   •  Bempedoic Acid 180 MG tablet, Take 1 tablet by mouth Daily., Disp: 90 tablet, Rfl: 1  •  Calcium Polycarbophil (FIBER-CAPS PO), Take  by mouth., Disp: , Rfl:   •  cholecalciferol (VITAMIN D3) 25 MCG (1000 UT) tablet, Take 1,000 Units by mouth Daily., Disp: , Rfl:   •  coenzyme Q10 100 MG capsule, Take 100 mg by mouth Daily., Disp: , Rfl:   •  colestipol (COLESTID) 1 g tablet, Take 1 tablet by mouth 2 (Two) Times a Day., Disp: 180 tablet, Rfl: 1  •  GARLIC PO, garlic 1,000 mg oral capsule take 1 capsule by oral route daily for 30 days   Active, Disp: , Rfl:   •  levothyroxine (Euthyrox) 25 MCG tablet, Take 1 tablet by mouth Every Morning., Disp: 90 tablet, Rfl: 1  •  multivitamin with minerals tablet tablet, Take 2 tablets by mouth Daily. For eyes/macular degeneration, Disp: , Rfl:   •  vitamin B-12 (CYANOCOBALAMIN) 100 MCG tablet, vitamin B12-folic acid 500-400 mcg oral tablet take 1 tablet by oral route daily   Active, Disp: , Rfl:   •  Zinc 10 MG lozenge, , Disp: , Rfl:   •  desonide (DESOWEN) 0.05 % cream, desonide 0.05 % topical cream apply sparingly and rub gently into the affected area(s) by topical route 2 times per day   Suspended, Disp: , Rfl:   •  lidocaine (LIDODERM) 5 %, Place 3 patches on the skin as directed by provider Daily. Remove & Discard patch  "within 12 hours or as directed by MD, Disp: 90 each, Rfl: 5    Allergies:   Allergies   Allergen Reactions   • Aleve [Naproxen] Unknown - High Severity   • Atorvastatin Unknown - High Severity   • Fenofibrate Hives   • Omega 3-6-9 Fatty Acids Diarrhea   • Penicillins Unknown - High Severity   • Zetia [Ezetimibe] Rash           Objective     Physical Exam:  Vital Signs:   Vitals:    09/29/22 1030   BP: 114/57   Pulse: 69   Temp: 96.6 °F (35.9 °C)   SpO2: 100%   Weight: 52.2 kg (115 lb)   Height: 160 cm (63\")     Body mass index is 20.37 kg/m².     Physical Exam  HENT:      Right Ear: There is impacted cerumen.      Left Ear: There is impacted cerumen.      Nose: Nose normal.      Mouth/Throat:      Mouth: Mucous membranes are moist.   Eyes:      Conjunctiva/sclera: Conjunctivae normal.   Neck:      Vascular: No carotid bruit.   Cardiovascular:      Rate and Rhythm: Normal rate and regular rhythm.      Heart sounds: Normal heart sounds. No murmur heard.  Pulmonary:      Effort: Pulmonary effort is normal.      Breath sounds: Normal breath sounds.   Abdominal:      General: Bowel sounds are normal.      Palpations: Abdomen is soft.   Musculoskeletal:      Lumbar back: Tenderness present. No swelling or edema. Normal range of motion. Positive left straight leg raise test. Negative right straight leg raise test.        Back:       Right lower leg: No edema.      Left lower leg: No edema.   Skin:     General: Skin is warm and dry.   Neurological:      Mental Status: She is alert.      Gait: Gait normal.   Psychiatric:         Mood and Affect: Mood normal.         Behavior: Behavior normal.             Assessment / Plan      Assessment/Plan:   Diagnoses and all orders for this visit:    1. Acquired hypothyroidism  -     levothyroxine (Euthyrox) 25 MCG tablet; Take 1 tablet by mouth Every Morning.  Dispense: 90 tablet; Refill: 1    2. Mixed hyperlipidemia    3. Statin intolerance    4. Gastro-esophageal reflux disease " "without esophagitis    5. Diarrhea, unspecified type    6. Bilateral impacted cerumen    7. History of smoking 25-50 pack years    8. Tobacco abuse    Other orders  -     lidocaine (LIDODERM) 5 %; Place 3 patches on the skin as directed by provider Daily. Remove & Discard patch within 12 hours or as directed by MD  Dispense: 90 each; Refill: 5       Hypothyroidism TSH within goal range stable on current levothyroxine dose 25 mcg daily provide refill  Hyperlipidemia patient has statin intolerance allergic to fenofibrate allergic to Zetia insurance patient is not able to pay co-pay with insurance for Livalo or Nexletol will attempt patient assistance avoid fried foods red meat pork products cheese increase fruits vegetables whole grains  Reflux stable at this time with diet  Diarrhea controlled with Colestid  Bilateral impaction cerumen cleared with irrigation  History of smoking greater than 25 pack years with tobacco abuse CT low-dose chest up-to-date reviewed with patient we will screen annually          Follow Up:   Return in about 6 months (around 3/29/2023).    Meera Dunlap, MICHELLE    \"Please note that portions of this note were completed with a voice recognition program.\"    "

## 2022-09-30 RX ORDER — GEMFIBROZIL 600 MG/1
600 TABLET, FILM COATED ORAL
Qty: 180 TABLET | Refills: 0 | Status: CANCELLED | OUTPATIENT
Start: 2022-09-30

## 2022-09-30 RX ORDER — GEMFIBROZIL 600 MG/1
600 TABLET, FILM COATED ORAL
COMMUNITY
End: 2023-01-19

## 2022-10-05 DIAGNOSIS — E78.2 MIXED HYPERLIPIDEMIA: Primary | ICD-10-CM

## 2022-12-29 ENCOUNTER — PATIENT OUTREACH (OUTPATIENT)
Dept: CASE MANAGEMENT | Facility: OTHER | Age: 72
End: 2022-12-29

## 2022-12-29 ENCOUNTER — TELEPHONE (OUTPATIENT)
Dept: CASE MANAGEMENT | Facility: OTHER | Age: 72
End: 2022-12-29

## 2022-12-29 DIAGNOSIS — E78.2 MIXED HYPERLIPIDEMIA: Primary | ICD-10-CM

## 2022-12-29 DIAGNOSIS — R19.7 DIARRHEA, UNSPECIFIED TYPE: ICD-10-CM

## 2022-12-29 NOTE — OUTREACH NOTE
AMBULATORY CASE MANAGEMENT NOTE    Name and Relationship of Patient/Support Person:  -     See telephone message. Appt made for 1/6/23. Will provide dietary information during that visit.     Education Documentation  No documentation found.        LAUREL PUENTES  Ambulatory Case Management    12/29/2022, 14:55 EST

## 2022-12-29 NOTE — TELEPHONE ENCOUNTER
Contacted by  Crispin () and he reports Ms Kramer had expressed desire for Dietary referral. I called Ms Kramer and she reports asking for this referral because she is having continued diarrhea and high cholesterol. She has had colonoscopy 11/14/18--dx IBS-D and Collagenous colitis. Last f/u was 4/9/19. She reports 3 diarrhea stools each am and sometimes after she eats. Taking Colestid one tablet bid. Do you want her to make f/u with you or GI to discuss this? Maybe adjust Colestid?     She also wanted the referral because of her high cholesterol. Reports cant take statins. I will send her high cholesterol diet plan for her to review.

## 2023-01-06 ENCOUNTER — OFFICE VISIT (OUTPATIENT)
Dept: FAMILY MEDICINE CLINIC | Facility: CLINIC | Age: 73
End: 2023-01-06
Payer: MEDICARE

## 2023-01-06 VITALS
WEIGHT: 118 LBS | HEART RATE: 83 BPM | OXYGEN SATURATION: 99 % | DIASTOLIC BLOOD PRESSURE: 80 MMHG | TEMPERATURE: 96.7 F | HEIGHT: 63 IN | BODY MASS INDEX: 20.91 KG/M2 | SYSTOLIC BLOOD PRESSURE: 138 MMHG

## 2023-01-06 DIAGNOSIS — K21.9 GASTRO-ESOPHAGEAL REFLUX DISEASE WITHOUT ESOPHAGITIS: ICD-10-CM

## 2023-01-06 DIAGNOSIS — Z72.0 TOBACCO ABUSE: ICD-10-CM

## 2023-01-06 DIAGNOSIS — R03.0 ELEVATED BLOOD PRESSURE READING: ICD-10-CM

## 2023-01-06 DIAGNOSIS — Z78.9 STATIN INTOLERANCE: ICD-10-CM

## 2023-01-06 DIAGNOSIS — E03.9 ACQUIRED HYPOTHYROIDISM: ICD-10-CM

## 2023-01-06 DIAGNOSIS — R19.7 DIARRHEA, UNSPECIFIED TYPE: ICD-10-CM

## 2023-01-06 DIAGNOSIS — E78.2 MIXED HYPERLIPIDEMIA: Primary | ICD-10-CM

## 2023-01-06 DIAGNOSIS — R35.0 URINARY FREQUENCY: ICD-10-CM

## 2023-01-06 DIAGNOSIS — Z87.891 HISTORY OF SMOKING 25-50 PACK YEARS: ICD-10-CM

## 2023-01-06 PROBLEM — A68.1: Status: ACTIVE | Noted: 2023-01-06

## 2023-01-06 LAB
BACTERIA UR QL AUTO: ABNORMAL /HPF
BILIRUB UR QL STRIP: NEGATIVE
CLARITY UR: CLEAR
COLOR UR: ABNORMAL
GLUCOSE UR STRIP-MCNC: NEGATIVE MG/DL
HGB UR QL STRIP.AUTO: NEGATIVE
HYALINE CASTS UR QL AUTO: ABNORMAL /LPF
KETONES UR QL STRIP: NEGATIVE
LEUKOCYTE ESTERASE UR QL STRIP.AUTO: ABNORMAL
NITRITE UR QL STRIP: NEGATIVE
PH UR STRIP.AUTO: 5.5 [PH] (ref 5–8)
PROT UR QL STRIP: NEGATIVE
RBC # UR STRIP: ABNORMAL /HPF
REF LAB TEST METHOD: ABNORMAL
SP GR UR STRIP: 1.02 (ref 1–1.03)
SQUAMOUS #/AREA URNS HPF: ABNORMAL /HPF
TRANS CELLS #/AREA URNS HPF: ABNORMAL /HPF
UROBILINOGEN UR QL STRIP: ABNORMAL
WBC # UR STRIP: ABNORMAL /HPF

## 2023-01-06 PROCEDURE — 99214 OFFICE O/P EST MOD 30 MIN: CPT | Performed by: NURSE PRACTITIONER

## 2023-01-06 PROCEDURE — 81001 URINALYSIS AUTO W/SCOPE: CPT | Performed by: NURSE PRACTITIONER

## 2023-01-06 PROCEDURE — 87086 URINE CULTURE/COLONY COUNT: CPT | Performed by: NURSE PRACTITIONER

## 2023-01-06 RX ORDER — MONTELUKAST SODIUM 4 MG/1
2 TABLET, CHEWABLE ORAL 2 TIMES DAILY
Qty: 360 TABLET | Refills: 1 | Status: SHIPPED | OUTPATIENT
Start: 2023-01-06

## 2023-01-06 NOTE — PROGRESS NOTES
Follow Up Office Visit      Patient Name: Samira Kramer  : 1950   MRN: [id number]     Chief Complaint:    Chief Complaint   Patient presents with   • Hyperlipidemia   • Hypothyroidism   • Heartburn   • Vitamin D Deficiency       History of Present Illness: Samira Kramer is a 72 y.o. female who is here today to follow up for hypothyroidism, hyperlipidemia, GERD, and diarrhea     mammogram- refuses  dexa- refuses  Pap- refuses  Colonoscopy- 2018  Ct low dose chest    Lung rads category 2.  Annual low-dose screening CT recommended.     Smoking since age 19 1ppd declined smoking cessation     Patient states is not able to tolerate statins    Also c/o diarrhea 3 loose stools each day and when after she eats evening meal at times, pt reports she is taking colestid one tablet 2x per day with no improvement      Subjective      Review of Systems:   Review of Systems   Constitutional: Negative for fever.   HENT: Negative for ear pain and sore throat.    Respiratory: Negative for cough.    Cardiovascular: Negative for chest pain.   Gastrointestinal: Positive for diarrhea. Negative for abdominal pain, blood in stool, nausea and vomiting.   Genitourinary: Negative for dysuria.        Past Medical History:   Past Medical History:   Diagnosis Date   • Dysphagia following cerebral infarction    • Dysphagia, unspecified    • Gastro-esophageal reflux disease without esophagitis    • Hyperlipidemia, unspecified    • Hypothyroidism, unspecified    • Macular degeneration    • Tick-borne relapsing fever    • Vitamin D deficiency, unspecified        Past Surgical History:   Past Surgical History:   Procedure Laterality Date   • COLONOSCOPY  2017, 2018    per Dr. Hernandez, normal, due in    • URETEROSCOPY  2018    SCOPE-BLADDER AND KIDNEYS       Family History:   Family History   Problem Relation Age of Onset   • Cancer Mother         TYPE NOT SPECIFIED   • Cancer Father         TYPE  NOT SPECIFIED       Social History:   Social History     Socioeconomic History   • Marital status:    Tobacco Use   • Smoking status: Former   • Smokeless tobacco: Never   Vaping Use   • Vaping Use: Never used   Substance and Sexual Activity   • Alcohol use: Never   • Drug use: Never   • Sexual activity: Defer       Medications:     Current Outpatient Medications:   •  Ascorbic Acid (Vitamin C) 500 MG/5ML liquid, Daily., Disp: , Rfl:   •  Bempedoic Acid 180 MG tablet, Take 1 tablet by mouth Daily., Disp: 90 tablet, Rfl: 1  •  Calcium Polycarbophil (FIBER-CAPS PO), Take  by mouth., Disp: , Rfl:   •  cholecalciferol (VITAMIN D3) 25 MCG (1000 UT) tablet, Take 1,000 Units by mouth Daily., Disp: , Rfl:   •  coenzyme Q10 100 MG capsule, Take 100 mg by mouth Daily., Disp: , Rfl:   •  colestipol (COLESTID) 1 g tablet, Take 2 tablets by mouth 2 (Two) Times a Day., Disp: 360 tablet, Rfl: 1  •  desonide (DESOWEN) 0.05 % cream, desonide 0.05 % topical cream apply sparingly and rub gently into the affected area(s) by topical route 2 times per day   Suspended, Disp: , Rfl:   •  GARLIC PO, garlic 1,000 mg oral capsule take 1 capsule by oral route daily for 30 days   Active, Disp: , Rfl:   •  gemfibrozil (LOPID) 600 MG tablet, Take 600 mg by mouth 2 (Two) Times a Day Before Meals., Disp: , Rfl:   •  levothyroxine (Euthyrox) 25 MCG tablet, Take 1 tablet by mouth Every Morning., Disp: 90 tablet, Rfl: 1  •  lidocaine (LIDODERM) 5 %, Place 3 patches on the skin as directed by provider Daily. Remove & Discard patch within 12 hours or as directed by MD, Disp: 90 each, Rfl: 5  •  multivitamin with minerals tablet tablet, Take 2 tablets by mouth Daily. For eyes/macular degeneration, Disp: , Rfl:   •  vitamin B-12 (CYANOCOBALAMIN) 100 MCG tablet, vitamin B12-folic acid 500-400 mcg oral tablet take 1 tablet by oral route daily   Active, Disp: , Rfl:   •  Zinc 10 MG lozenge, , Disp: , Rfl:     Allergies:   Allergies   Allergen  "Reactions   • Aleve [Naproxen] Unknown - High Severity   • Atorvastatin Unknown - High Severity   • Fenofibrate Hives   • Omega 3-6-9 Fatty Acids Diarrhea   • Penicillins Unknown - High Severity   • Zetia [Ezetimibe] Rash           Objective     Physical Exam:  Vital Signs:   Vitals:    01/06/23 1415 01/06/23 1450   BP: 143/84 138/80   Pulse: 83    Temp: 96.7 °F (35.9 °C)    SpO2: 99%    Weight: 53.5 kg (118 lb)    Height: 160 cm (63\")      Body mass index is 20.9 kg/m².     Physical Exam  HENT:      Right Ear: Tympanic membrane normal.      Left Ear: Tympanic membrane normal.      Nose: Nose normal.      Mouth/Throat:      Mouth: Mucous membranes are moist.   Eyes:      Conjunctiva/sclera: Conjunctivae normal.   Neck:      Vascular: No carotid bruit.   Cardiovascular:      Rate and Rhythm: Normal rate and regular rhythm.      Heart sounds: Normal heart sounds. No murmur heard.  Pulmonary:      Effort: Pulmonary effort is normal.      Breath sounds: Normal breath sounds.   Abdominal:      General: Bowel sounds are normal.      Palpations: Abdomen is soft.   Musculoskeletal:      Right lower leg: No edema.      Left lower leg: No edema.   Skin:     General: Skin is warm and dry.   Neurological:      Mental Status: She is alert.   Psychiatric:         Mood and Affect: Mood normal.         Behavior: Behavior normal.             Assessment / Plan      Assessment/Plan:   Diagnoses and all orders for this visit:    1. Mixed hyperlipidemia (Primary)  -     Comprehensive Metabolic Panel  -     Lipid Panel  -     Cancel: Lipid Panel  -     Cancel: Urinalysis With Culture If Indicated -    2. Acquired hypothyroidism  -     TSH  -     T4, Free  -     Cancel: Urinalysis With Culture If Indicated -    3. Statin intolerance  -     Cancel: Urinalysis With Culture If Indicated -    4. Gastro-esophageal reflux disease without esophagitis  -     Ambulatory Referral to Gastroenterology  -     Helicobacter Pylori, IgA IgG IgM  -     " Cancel: Urinalysis With Culture If Indicated -    5. Diarrhea, unspecified type  -     CBC Auto Differential  -     Cancel: Urinalysis With Culture If Indicated - Urine, Clean Catch  -     Clostridioides difficile Toxin - Stool, Per Rectum  -     Amylase  -     Enteric Bacterial Panel - Stool, Per Rectum; Future  -     Enteric Parasite Panel - Stool, Per Rectum; Future  -     Occult Blood X 1, Stool - Stool,; Future  -     Ambulatory Referral to Gastroenterology  -     Occult Blood X 1, Stool - Stool,  -     Enteric Parasite Panel - Stool, Per Rectum  -     Enteric Bacterial Panel - Stool, Per Rectum  -     Cancel: Urinalysis With Culture If Indicated -    6. History of smoking 25-50 pack years  -     Cancel: Urinalysis With Culture If Indicated -    7. Tobacco abuse  -     Cancel: Urinalysis With Culture If Indicated -    8. Elevated blood pressure reading  -     Cancel: Urinalysis With Culture If Indicated -    9. Urinary frequency  -     Urinalysis With Culture If Indicated -; Future  -     Urinalysis With Culture If Indicated - Urine, Clean Catch; Future  -     Urinalysis With Culture If Indicated - Urine, Clean Catch  -     Urinalysis, Microscopic Only - Urine, Clean Catch  -     Urine Culture - Urine, Urine, Clean Catch    Other orders  -     colestipol (COLESTID) 1 g tablet; Take 2 tablets by mouth 2 (Two) Times a Day.  Dispense: 360 tablet; Refill: 1       Hyperlipidemia we will obtain lipid panel to monitor recommend reduce fried foods red meat pork products cheese increase fruits vegetables whole grains and exercise 30 minutes daily  Hypothyroidism we will obtain TSH to monitor current levothyroxine dose 25 mcg daily denies palpitations  Reflux will obtain labs for H. pylori provide Pepcid as needed  Diarrhea will increase Colestid obtain stool samples and labs refer to gastroenterology this is a persistent problem even with treatment  History of smoking greater than 25 pack years declined smoking  "cessation low-dose CT of the chest up-to-date          Follow Up:   Return in about 6 months (around 7/6/2023).    Meera Dunlap, APRN    \"Please note that portions of this note were completed with a voice recognition program.\"    "

## 2023-01-07 LAB — BACTERIA SPEC AEROBE CULT: NORMAL

## 2023-01-09 LAB
027 TOXIN: NORMAL
C DIFF TOX GENS STL QL NAA+PROBE: NEGATIVE

## 2023-01-09 PROCEDURE — 87493 C DIFF AMPLIFIED PROBE: CPT | Performed by: NURSE PRACTITIONER

## 2023-01-09 PROCEDURE — 87506 IADNA-DNA/RNA PROBE TQ 6-11: CPT | Performed by: NURSE PRACTITIONER

## 2023-01-10 LAB
C COLI+JEJ+UPSA DNA STL QL NAA+NON-PROBE: NOT DETECTED
CRYPTOSP DNA STL QL NAA+NON-PROBE: NOT DETECTED
E HISTOLYT DNA STL QL NAA+NON-PROBE: NOT DETECTED
EC STX1+STX2 GENES STL QL NAA+NON-PROBE: NOT DETECTED
G LAMBLIA DNA STL QL NAA+NON-PROBE: NOT DETECTED
S ENT+BONG DNA STL QL NAA+NON-PROBE: NOT DETECTED
SHIGELLA SP+EIEC IPAH ST NAA+NON-PROBE: NOT DETECTED

## 2023-01-16 ENCOUNTER — CLINICAL SUPPORT (OUTPATIENT)
Dept: FAMILY MEDICINE CLINIC | Facility: CLINIC | Age: 73
End: 2023-01-16
Payer: MEDICARE

## 2023-01-16 DIAGNOSIS — E78.2 MIXED HYPERLIPIDEMIA: ICD-10-CM

## 2023-01-16 LAB
BASOPHILS # BLD AUTO: 0.04 10*3/MM3 (ref 0–0.2)
BASOPHILS NFR BLD AUTO: 0.5 % (ref 0–1.5)
DEPRECATED RDW RBC AUTO: 46.5 FL (ref 37–54)
EOSINOPHIL # BLD AUTO: 0.14 10*3/MM3 (ref 0–0.4)
EOSINOPHIL NFR BLD AUTO: 1.9 % (ref 0.3–6.2)
ERYTHROCYTE [DISTWIDTH] IN BLOOD BY AUTOMATED COUNT: 12.8 % (ref 12.3–15.4)
HCT VFR BLD AUTO: 48 % (ref 34–46.6)
HGB BLD-MCNC: 16.3 G/DL (ref 12–15.9)
IMM GRANULOCYTES # BLD AUTO: 0.02 10*3/MM3 (ref 0–0.05)
IMM GRANULOCYTES NFR BLD AUTO: 0.3 % (ref 0–0.5)
LYMPHOCYTES # BLD AUTO: 1.7 10*3/MM3 (ref 0.7–3.1)
LYMPHOCYTES NFR BLD AUTO: 22.8 % (ref 19.6–45.3)
MCH RBC QN AUTO: 33.7 PG (ref 26.6–33)
MCHC RBC AUTO-ENTMCNC: 34 G/DL (ref 31.5–35.7)
MCV RBC AUTO: 99.4 FL (ref 79–97)
MONOCYTES # BLD AUTO: 0.56 10*3/MM3 (ref 0.1–0.9)
MONOCYTES NFR BLD AUTO: 7.5 % (ref 5–12)
NEUTROPHILS NFR BLD AUTO: 5.01 10*3/MM3 (ref 1.7–7)
NEUTROPHILS NFR BLD AUTO: 67 % (ref 42.7–76)
NRBC BLD AUTO-RTO: 0 /100 WBC (ref 0–0.2)
PLATELET # BLD AUTO: 211 10*3/MM3 (ref 140–450)
PMV BLD AUTO: 10.8 FL (ref 6–12)
RBC # BLD AUTO: 4.83 10*6/MM3 (ref 3.77–5.28)
WBC NRBC COR # BLD: 7.47 10*3/MM3 (ref 3.4–10.8)

## 2023-01-16 PROCEDURE — 82150 ASSAY OF AMYLASE: CPT | Performed by: NURSE PRACTITIONER

## 2023-01-16 PROCEDURE — 85025 COMPLETE CBC W/AUTO DIFF WBC: CPT | Performed by: NURSE PRACTITIONER

## 2023-01-16 PROCEDURE — 36415 COLL VENOUS BLD VENIPUNCTURE: CPT | Performed by: NURSE PRACTITIONER

## 2023-01-16 PROCEDURE — 80053 COMPREHEN METABOLIC PANEL: CPT | Performed by: NURSE PRACTITIONER

## 2023-01-16 PROCEDURE — 84443 ASSAY THYROID STIM HORMONE: CPT | Performed by: NURSE PRACTITIONER

## 2023-01-16 PROCEDURE — 86677 HELICOBACTER PYLORI ANTIBODY: CPT | Performed by: NURSE PRACTITIONER

## 2023-01-16 PROCEDURE — 84439 ASSAY OF FREE THYROXINE: CPT | Performed by: NURSE PRACTITIONER

## 2023-01-16 PROCEDURE — 80061 LIPID PANEL: CPT | Performed by: NURSE PRACTITIONER

## 2023-01-17 LAB
ALBUMIN SERPL-MCNC: 4 G/DL (ref 3.5–5.2)
ALBUMIN/GLOB SERPL: 1.1 G/DL
ALP SERPL-CCNC: 100 U/L (ref 39–117)
ALT SERPL W P-5'-P-CCNC: 15 U/L (ref 1–33)
AMYLASE SERPL-CCNC: 113 U/L (ref 28–100)
ANION GAP SERPL CALCULATED.3IONS-SCNC: 9.8 MMOL/L (ref 5–15)
AST SERPL-CCNC: 20 U/L (ref 1–32)
BILIRUB SERPL-MCNC: 0.3 MG/DL (ref 0–1.2)
BUN SERPL-MCNC: 14 MG/DL (ref 8–23)
BUN/CREAT SERPL: 16.5 (ref 7–25)
CALCIUM SPEC-SCNC: 10.1 MG/DL (ref 8.6–10.5)
CHLORIDE SERPL-SCNC: 104 MMOL/L (ref 98–107)
CHOLEST SERPL-MCNC: 235 MG/DL (ref 0–200)
CO2 SERPL-SCNC: 26.2 MMOL/L (ref 22–29)
CREAT SERPL-MCNC: 0.85 MG/DL (ref 0.57–1)
EGFRCR SERPLBLD CKD-EPI 2021: 72.4 ML/MIN/1.73
GLOBULIN UR ELPH-MCNC: 3.7 GM/DL
GLUCOSE SERPL-MCNC: 88 MG/DL (ref 65–99)
HDLC SERPL-MCNC: 38 MG/DL (ref 40–60)
LDLC SERPL CALC-MCNC: 141 MG/DL (ref 0–100)
LDLC/HDLC SERPL: 3.57 {RATIO}
POTASSIUM SERPL-SCNC: 4.3 MMOL/L (ref 3.5–5.2)
PROT SERPL-MCNC: 7.7 G/DL (ref 6–8.5)
SODIUM SERPL-SCNC: 140 MMOL/L (ref 136–145)
T4 FREE SERPL-MCNC: 1.29 NG/DL (ref 0.93–1.7)
TRIGL SERPL-MCNC: 307 MG/DL (ref 0–150)
TSH SERPL DL<=0.05 MIU/L-ACNC: 3.36 UIU/ML (ref 0.27–4.2)
VLDLC SERPL-MCNC: 56 MG/DL (ref 5–40)

## 2023-01-20 ENCOUNTER — TELEPHONE (OUTPATIENT)
Dept: CASE MANAGEMENT | Facility: OTHER | Age: 73
End: 2023-01-20
Payer: MEDICARE

## 2023-01-20 NOTE — TELEPHONE ENCOUNTER
Samira reports that Livalo will cost her $150 for one month supply with her insurance. Using [employer] and [employer] it is $300 per month. She does not qualify for patient assistance. Any other alternative?

## 2023-01-25 NOTE — TELEPHONE ENCOUNTER
DYANA---Samira is not interested in Rapatha. She is going to watch her diet better. I will send educational materials on low cholesterol diet.

## 2023-01-28 LAB
H PYLORI IGA SER-ACNC: <9 UNITS (ref 0–8.9)
H PYLORI IGG SER IA-ACNC: 0.09 INDEX VALUE (ref 0–0.79)
H PYLORI IGM SER-ACNC: <9 UNITS (ref 0–8.9)

## 2023-02-20 ENCOUNTER — PATIENT OUTREACH (OUTPATIENT)
Dept: CASE MANAGEMENT | Facility: OTHER | Age: 73
End: 2023-02-20
Payer: MEDICARE

## 2023-02-20 DIAGNOSIS — R19.7 DIARRHEA, UNSPECIFIED TYPE: ICD-10-CM

## 2023-02-20 DIAGNOSIS — E78.2 MIXED HYPERLIPIDEMIA: Primary | ICD-10-CM

## 2023-02-20 NOTE — OUTREACH NOTE
AMBULATORY CASE MANAGEMENT NOTE    Name and Relationship of Patient/Support Person:  -     Ms Kramer declines GI referral. Reports diarrhea off and on. Thinks it has to do with something she is eating. Instructed to call office if she wants to proceed with GI appt.     Education Documentation  No documentation found.        Pat PUENTES  Ambulatory Case Management    2/20/2023, 11:40 EST

## 2023-03-20 ENCOUNTER — PATIENT OUTREACH (OUTPATIENT)
Dept: CASE MANAGEMENT | Facility: OTHER | Age: 73
End: 2023-03-20
Payer: MEDICARE

## 2023-03-20 DIAGNOSIS — E78.2 MIXED HYPERLIPIDEMIA: Primary | ICD-10-CM

## 2023-03-20 NOTE — OUTREACH NOTE
AMBULATORY CASE MANAGEMENT NOTE    Name and Relationship of Patient/Support Person:  -     Chart review done. No recent office visits or phone calls to office. Consider discharge if no needs.     Education Documentation  No documentation found.        Pat PUENTES  Ambulatory Case Management    3/20/2023, 15:12 EDT

## 2023-04-14 ENCOUNTER — TELEPHONE (OUTPATIENT)
Dept: FAMILY MEDICINE CLINIC | Facility: CLINIC | Age: 73
End: 2023-04-14

## 2023-04-14 ENCOUNTER — OFFICE VISIT (OUTPATIENT)
Dept: FAMILY MEDICINE CLINIC | Facility: CLINIC | Age: 73
End: 2023-04-14
Payer: MEDICARE

## 2023-04-14 VITALS
DIASTOLIC BLOOD PRESSURE: 71 MMHG | WEIGHT: 113.6 LBS | OXYGEN SATURATION: 97 % | TEMPERATURE: 97.6 F | BODY MASS INDEX: 20.91 KG/M2 | SYSTOLIC BLOOD PRESSURE: 124 MMHG | HEART RATE: 76 BPM | HEIGHT: 62 IN

## 2023-04-14 DIAGNOSIS — Z79.899 MEDICATION MANAGEMENT: ICD-10-CM

## 2023-04-14 DIAGNOSIS — S42.294D OTHER CLOSED NONDISPLACED FRACTURE OF PROXIMAL END OF RIGHT HUMERUS WITH ROUTINE HEALING, SUBSEQUENT ENCOUNTER: ICD-10-CM

## 2023-04-14 PROBLEM — S42.201A CLOSED FRACTURE OF PROXIMAL END OF RIGHT HUMERUS: Status: ACTIVE | Noted: 2023-04-14

## 2023-04-14 RX ORDER — HYDROCODONE BITARTRATE AND ACETAMINOPHEN 5; 325 MG/1; MG/1
1 TABLET ORAL EVERY 4 HOURS PRN
Qty: 20 TABLET | Refills: 0 | Status: SHIPPED | OUTPATIENT
Start: 2023-04-14 | End: 2023-04-18 | Stop reason: HOSPADM

## 2023-04-14 NOTE — PROGRESS NOTES
ACUTE VISIT     Patient Name: Samira Kramer  : 1950   MRN: [id number]     Chief Complaint:    Chief Complaint   Patient presents with   • Follow-up     Right fractured arm, pt requesting pain medication        History of Present Illness: Samira Kramer is a 72 y.o. female who is here today for follow up urgent care for right arm fracture      Patient reports she has received a Toradol injection at urgent care that only lasted 5 to 6 hours  Is taking Tylenol and aspirin 3 a few times a day without relieving her pain  Consult with orthopedic surgery scheduled     PROCEDURE:  XR SHOULDER 2+ VW RIGHT     COMPARISON: None     INDICATIONS:  Fall off ladder at home today x90 minutes ago, right anterior shoulder pain, no neck   pain.  Unable to abduct right shoulder, pain with flexing lower right forea     FINDINGS:          There is a comminuted and impacted fracture of the proximal humerus.  The glenoid is intact.  The   AC joint is congruent.     IMPRESSION:               Comminuted and impacted proximal right humeral fracture.         Subjective      Review of Systems:   Review of Systems   Constitutional: Negative for fever.   Respiratory: Negative for cough.    Cardiovascular: Negative for chest pain.   Musculoskeletal: Positive for arthralgias and joint swelling.        Past Medical History:   Past Medical History:   Diagnosis Date   • Dysphagia following cerebral infarction    • Dysphagia, unspecified    • Gastro-esophageal reflux disease without esophagitis    • Hyperlipidemia, unspecified    • Hypothyroidism, unspecified    • Macular degeneration    • Tick-borne relapsing fever    • Vitamin D deficiency, unspecified        Past Surgical History:   Past Surgical History:   Procedure Laterality Date   • COLONOSCOPY  2017, 2018    per Dr. Hernandez, normal, due in    • URETEROSCOPY  2018    SCOPE-BLADDER AND KIDNEYS       Family History:   Family History   Problem  Relation Age of Onset   • Cancer Mother         TYPE NOT SPECIFIED   • Cancer Father         TYPE NOT SPECIFIED       Social History:   Social History     Socioeconomic History   • Marital status:    Tobacco Use   • Smoking status: Every Day     Packs/day: 1.00     Types: Cigarettes   • Smokeless tobacco: Never   Vaping Use   • Vaping Use: Never used   Substance and Sexual Activity   • Alcohol use: Never   • Drug use: Never   • Sexual activity: Defer       Medications:     Current Outpatient Medications:   •  acetaminophen (TYLENOL) 500 MG tablet, Take 1 tablet by mouth Every 6 (Six) Hours As Needed for Mild Pain or Moderate Pain for up to 14 days., Disp: 56 tablet, Rfl: 0  •  Ascorbic Acid (Vitamin C) 500 MG/5ML liquid, Daily., Disp: , Rfl:   •  Calcium Polycarbophil (FIBER-CAPS PO), Take  by mouth., Disp: , Rfl:   •  cholecalciferol (VITAMIN D3) 25 MCG (1000 UT) tablet, Take 1 tablet by mouth Daily., Disp: , Rfl:   •  coenzyme Q10 100 MG capsule, Take 1 capsule by mouth Daily., Disp: , Rfl:   •  colestipol (COLESTID) 1 g tablet, Take 2 tablets by mouth 2 (Two) Times a Day., Disp: 360 tablet, Rfl: 1  •  desonide (DESOWEN) 0.05 % cream, desonide 0.05 % topical cream apply sparingly and rub gently into the affected area(s) by topical route 2 times per day   Suspended, Disp: , Rfl:   •  levothyroxine (Euthyrox) 25 MCG tablet, Take 1 tablet by mouth Every Morning., Disp: 90 tablet, Rfl: 1  •  lidocaine (LIDODERM) 5 %, Place 3 patches on the skin as directed by provider Daily. Remove & Discard patch within 12 hours or as directed by MD, Disp: 90 each, Rfl: 5  •  multivitamin with minerals tablet tablet, Take 2 tablets by mouth Daily. For eyes/macular degeneration, Disp: , Rfl:   •  vitamin B-12 (CYANOCOBALAMIN) 100 MCG tablet, vitamin B12-folic acid 500-400 mcg oral tablet take 1 tablet by oral route daily   Active, Disp: , Rfl:   •  Zinc 10 MG lozenge, , Disp: , Rfl:   •  HYDROcodone-acetaminophen (NORCO) 5-325  "MG per tablet, Take 1 tablet by mouth Every 4 (Four) Hours As Needed for Moderate Pain or Severe Pain., Disp: 20 tablet, Rfl: 0  No current facility-administered medications for this visit.    Allergies:   Allergies   Allergen Reactions   • Aleve [Naproxen] Unknown - High Severity   • Atorvastatin Unknown - High Severity   • Fenofibrate Hives   • Omega 3-6-9 Fatty Acids Diarrhea   • Penicillins Unknown - High Severity   • Zetia [Ezetimibe] Rash         Objective     Physical Exam:  Vital Signs:   Vitals:    04/14/23 1119   BP: 124/71   BP Location: Left arm   Patient Position: Sitting   Cuff Size: Adult   Pulse: 76   Temp: 97.6 °F (36.4 °C)   TempSrc: Infrared   SpO2: 97%   Weight: 51.5 kg (113 lb 9.6 oz)   Height: 157.5 cm (62\")   PainSc: 10-Worst pain ever   PainLoc: Comment: right arm     Body mass index is 20.78 kg/m².     Physical Exam  Constitutional:       General: She is not in acute distress.  Cardiovascular:      Rate and Rhythm: Normal rate and regular rhythm.      Heart sounds: Normal heart sounds. No murmur heard.  Pulmonary:      Breath sounds: Normal breath sounds.   Musculoskeletal:      Comments: Right anterior shoulder and bony tenderness Humeral head right present. mild swelling, no deformity     Decreased range of motion Difficulty with active abduction, external rotation   Skin:     General: Skin is warm and dry.      Capillary Refill: Capillary refill takes less than 2 seconds.   Neurological:      Mental Status: She is alert.             Assessment / Plan      Assessment/Plan:   Diagnoses and all orders for this visit:    1. Other closed nondisplaced fracture of proximal end of right humerus with routine healing, subsequent encounter  -     HYDROcodone-acetaminophen (NORCO) 5-325 MG per tablet; Take 1 tablet by mouth Every 4 (Four) Hours As Needed for Moderate Pain or Severe Pain.  Dispense: 20 tablet; Refill: 0    2. Medication management  -     POC Urine Drug Screen [facility]     "     Closed nondisplaced fracture proximal right humerus we will provide hydrocodone until seen by orthopedic surgery sling in place as advised by urgent care provider urine drug screen obtained [facility] reviewed      Follow Up:   Return if symptoms worsen or fail to improve.    Meera Dunlap, APRN      Please note that portions of this note were completed with a voice recognition program.

## 2023-04-14 NOTE — TELEPHONE ENCOUNTER
Caller: Samira Kramer    Relationship: Self    Best call back number: [phone]    What medication are you requesting: PAIN MEDICATION    What are your current symptoms: PATIENT BROKE ARM YESTERDAY 4.13.2023    How long have you been experiencing symptoms: TWO DAYS    Have you had these symptoms before:    [] Yes  [x] No    Have you been treated for these symptoms before:   [] Yes  [x] No    If a prescription is needed, what is your preferred pharmacy and phone number: [facility] [address] [place] [address] - [address] [facility] [phone] [facility] [phone]      Additional notes: PATIENT CANNOT GET IN WITH A DOCTOR UNTIL Monday 4.17.2023 AND WOULD LIKE PAIN MEDICATION CALLED IN TO HELP HER UNTIL THEN. PLEASE CALL AND ADVISE.

## 2023-04-17 ENCOUNTER — OFFICE VISIT (OUTPATIENT)
Dept: ORTHOPEDIC SURGERY | Facility: CLINIC | Age: 73
End: 2023-04-17
Payer: MEDICARE

## 2023-04-17 ENCOUNTER — PREP FOR SURGERY (OUTPATIENT)
Dept: OTHER | Facility: HOSPITAL | Age: 73
End: 2023-04-17
Payer: MEDICARE

## 2023-04-17 VITALS — BODY MASS INDEX: 20.98 KG/M2 | OXYGEN SATURATION: 98 % | WEIGHT: 114 LBS | HEIGHT: 62 IN | HEART RATE: 74 BPM

## 2023-04-17 DIAGNOSIS — S42.291A CLOSED 3-PART FRACTURE OF PROXIMAL END OF RIGHT HUMERUS, INITIAL ENCOUNTER: Primary | ICD-10-CM

## 2023-04-17 PROBLEM — S42.201A CLOSED FRACTURE OF RIGHT PROXIMAL HUMERUS: Status: ACTIVE | Noted: 2023-04-17

## 2023-04-17 RX ORDER — CEFAZOLIN SODIUM IN 0.9 % NACL 3 G/100 ML
3 INTRAVENOUS SOLUTION, PIGGYBACK (ML) INTRAVENOUS ONCE
Status: CANCELLED | OUTPATIENT
Start: 2023-04-17 | End: 2023-04-17

## 2023-04-17 RX ORDER — CEFAZOLIN SODIUM 2 G/100ML
2 INJECTION, SOLUTION INTRAVENOUS ONCE
Status: CANCELLED | OUTPATIENT
Start: 2023-04-17 | End: 2023-04-17

## 2023-04-17 NOTE — PRE-PROCEDURE INSTRUCTIONS
PRE-OP INSTRUCTIONS REVIEWED WITH PATIENT: FASTING/BATHING/ARRIVAL PROCEDURES.  INSTRUCTED TO TAKE A.M. DAY OF SURGERY: NORCO PRN, COLESTIPOLE, LEVOTHYROXINE  UNDERSTANDING VERBALIZED.

## 2023-04-18 ENCOUNTER — APPOINTMENT (OUTPATIENT)
Dept: GENERAL RADIOLOGY | Facility: HOSPITAL | Age: 73
End: 2023-04-18
Payer: MEDICARE

## 2023-04-18 ENCOUNTER — ANESTHESIA (OUTPATIENT)
Dept: PERIOP | Facility: HOSPITAL | Age: 73
End: 2023-04-18
Payer: MEDICARE

## 2023-04-18 ENCOUNTER — ANESTHESIA EVENT (OUTPATIENT)
Dept: PERIOP | Facility: HOSPITAL | Age: 73
End: 2023-04-18
Payer: MEDICARE

## 2023-04-18 ENCOUNTER — TELEPHONE (OUTPATIENT)
Dept: ORTHOPEDIC SURGERY | Facility: CLINIC | Age: 73
End: 2023-04-18

## 2023-04-18 ENCOUNTER — HOSPITAL ENCOUNTER (OUTPATIENT)
Facility: HOSPITAL | Age: 73
Discharge: HOME | End: 2023-04-18
Attending: ORTHOPAEDIC SURGERY | Admitting: ORTHOPAEDIC SURGERY
Payer: MEDICARE

## 2023-04-18 VITALS
RESPIRATION RATE: 16 BRPM | OXYGEN SATURATION: 95 % | SYSTOLIC BLOOD PRESSURE: 118 MMHG | TEMPERATURE: 97 F | DIASTOLIC BLOOD PRESSURE: 54 MMHG | HEART RATE: 73 BPM | HEIGHT: 62 IN | BODY MASS INDEX: 22.15 KG/M2 | WEIGHT: 120.37 LBS

## 2023-04-18 DIAGNOSIS — S42.291D CLOSED 3-PART FRACTURE OF PROXIMAL END OF RIGHT HUMERUS WITH ROUTINE HEALING, SUBSEQUENT ENCOUNTER: Primary | ICD-10-CM

## 2023-04-18 DIAGNOSIS — S42.291A CLOSED 3-PART FRACTURE OF PROXIMAL END OF RIGHT HUMERUS, INITIAL ENCOUNTER: ICD-10-CM

## 2023-04-18 LAB — QT INTERVAL: 376 MS

## 2023-04-18 PROCEDURE — 25010000002 MIDAZOLAM PER 1MG: Performed by: ANESTHESIOLOGY

## 2023-04-18 PROCEDURE — A9270 NON-COVERED ITEM OR SERVICE: HCPCS | Performed by: ANESTHESIOLOGY

## 2023-04-18 PROCEDURE — C1713 ANCHOR/SCREW BN/BN,TIS/BN: HCPCS | Performed by: ORTHOPAEDIC SURGERY

## 2023-04-18 PROCEDURE — 63710000001 ACETAMINOPHEN 500 MG TABLET: Performed by: ANESTHESIOLOGY

## 2023-04-18 PROCEDURE — 73020 X-RAY EXAM OF SHOULDER: CPT

## 2023-04-18 PROCEDURE — A9270 NON-COVERED ITEM OR SERVICE: HCPCS | Performed by: NURSE ANESTHETIST, CERTIFIED REGISTERED

## 2023-04-18 PROCEDURE — 93005 ELECTROCARDIOGRAM TRACING: CPT | Performed by: ORTHOPAEDIC SURGERY

## 2023-04-18 PROCEDURE — 25010000002 CEFAZOLIN IN DEXTROSE 2-4 GM/100ML-% SOLUTION: Performed by: ORTHOPAEDIC SURGERY

## 2023-04-18 PROCEDURE — 25010000002 PROPOFOL 10 MG/ML EMULSION: Performed by: NURSE ANESTHETIST, CERTIFIED REGISTERED

## 2023-04-18 PROCEDURE — 25010000002 FENTANYL CITRATE (PF) 50 MCG/ML SOLUTION: Performed by: NURSE ANESTHETIST, CERTIFIED REGISTERED

## 2023-04-18 PROCEDURE — 25010000002 DEXAMETHASONE PER 1 MG: Performed by: NURSE ANESTHETIST, CERTIFIED REGISTERED

## 2023-04-18 PROCEDURE — 25010000002 ONDANSETRON PER 1 MG: Performed by: NURSE ANESTHETIST, CERTIFIED REGISTERED

## 2023-04-18 PROCEDURE — 63710000001 OXYCODONE 5 MG TABLET: Performed by: NURSE ANESTHETIST, CERTIFIED REGISTERED

## 2023-04-18 PROCEDURE — 76000 FLUOROSCOPY <1 HR PHYS/QHP: CPT

## 2023-04-18 DEVICE — IMPLANTABLE DEVICE: Type: IMPLANTABLE DEVICE | Site: HUMERUS | Status: FUNCTIONAL

## 2023-04-18 DEVICE — SCRW LP T15 NL 3.5X24MM: Type: IMPLANTABLE DEVICE | Site: HUMERUS | Status: FUNCTIONAL

## 2023-04-18 DEVICE — SUT FW #2 W/TPR NDL 1/2 CIR 38IN 97CM 26.5MM BLU: Type: IMPLANTABLE DEVICE | Site: HUMERUS | Status: FUNCTIONAL

## 2023-04-18 RX ORDER — ONDANSETRON 2 MG/ML
4 INJECTION INTRAMUSCULAR; INTRAVENOUS ONCE AS NEEDED
Status: DISCONTINUED | OUTPATIENT
Start: 2023-04-18 | End: 2023-04-18 | Stop reason: HOSPADM

## 2023-04-18 RX ORDER — PHENYLEPHRINE HCL IN 0.9% NACL 1 MG/10 ML
SYRINGE (ML) INTRAVENOUS AS NEEDED
Status: DISCONTINUED | OUTPATIENT
Start: 2023-04-18 | End: 2023-04-18 | Stop reason: SURG

## 2023-04-18 RX ORDER — HYDROCODONE BITARTRATE AND ACETAMINOPHEN 7.5; 325 MG/1; MG/1
1 TABLET ORAL EVERY 4 HOURS PRN
Qty: 45 TABLET | Refills: 0 | Status: SHIPPED | OUTPATIENT
Start: 2023-04-18

## 2023-04-18 RX ORDER — LIDOCAINE HYDROCHLORIDE 20 MG/ML
INJECTION, SOLUTION EPIDURAL; INFILTRATION; INTRACAUDAL; PERINEURAL AS NEEDED
Status: DISCONTINUED | OUTPATIENT
Start: 2023-04-18 | End: 2023-04-18 | Stop reason: SURG

## 2023-04-18 RX ORDER — ONDANSETRON 2 MG/ML
INJECTION INTRAMUSCULAR; INTRAVENOUS AS NEEDED
Status: DISCONTINUED | OUTPATIENT
Start: 2023-04-18 | End: 2023-04-18 | Stop reason: SURG

## 2023-04-18 RX ORDER — MEPERIDINE HYDROCHLORIDE 25 MG/ML
12.5 INJECTION INTRAMUSCULAR; INTRAVENOUS; SUBCUTANEOUS
Status: DISCONTINUED | OUTPATIENT
Start: 2023-04-18 | End: 2023-04-18 | Stop reason: HOSPADM

## 2023-04-18 RX ORDER — CEFAZOLIN SODIUM 2 G/100ML
2 INJECTION, SOLUTION INTRAVENOUS ONCE
Status: COMPLETED | OUTPATIENT
Start: 2023-04-18 | End: 2023-04-18

## 2023-04-18 RX ORDER — MAGNESIUM HYDROXIDE 1200 MG/15ML
LIQUID ORAL AS NEEDED
Status: DISCONTINUED | OUTPATIENT
Start: 2023-04-18 | End: 2023-04-18 | Stop reason: HOSPADM

## 2023-04-18 RX ORDER — MIDAZOLAM HYDROCHLORIDE 2 MG/2ML
2 INJECTION, SOLUTION INTRAMUSCULAR; INTRAVENOUS ONCE
Status: COMPLETED | OUTPATIENT
Start: 2023-04-18 | End: 2023-04-18

## 2023-04-18 RX ORDER — EPHEDRINE SULFATE 50 MG/ML
INJECTION, SOLUTION INTRAVENOUS AS NEEDED
Status: DISCONTINUED | OUTPATIENT
Start: 2023-04-18 | End: 2023-04-18 | Stop reason: SURG

## 2023-04-18 RX ORDER — PROPOFOL 10 MG/ML
VIAL (ML) INTRAVENOUS AS NEEDED
Status: DISCONTINUED | OUTPATIENT
Start: 2023-04-18 | End: 2023-04-18 | Stop reason: SURG

## 2023-04-18 RX ORDER — BUPIVACAINE HYDROCHLORIDE AND EPINEPHRINE 5; 5 MG/ML; UG/ML
INJECTION, SOLUTION EPIDURAL; INTRACAUDAL; PERINEURAL
Status: COMPLETED | OUTPATIENT
Start: 2023-04-18 | End: 2023-04-18

## 2023-04-18 RX ORDER — DEXAMETHASONE SODIUM PHOSPHATE 4 MG/ML
INJECTION, SOLUTION INTRA-ARTICULAR; INTRALESIONAL; INTRAMUSCULAR; INTRAVENOUS; SOFT TISSUE AS NEEDED
Status: DISCONTINUED | OUTPATIENT
Start: 2023-04-18 | End: 2023-04-18 | Stop reason: SURG

## 2023-04-18 RX ORDER — CEFAZOLIN SODIUM IN 0.9 % NACL 3 G/100 ML
3 INTRAVENOUS SOLUTION, PIGGYBACK (ML) INTRAVENOUS ONCE
Status: DISCONTINUED | OUTPATIENT
Start: 2023-04-18 | End: 2023-04-18 | Stop reason: SDUPTHER

## 2023-04-18 RX ORDER — PROMETHAZINE HYDROCHLORIDE 25 MG/1
25 SUPPOSITORY RECTAL ONCE AS NEEDED
Status: DISCONTINUED | OUTPATIENT
Start: 2023-04-18 | End: 2023-04-18 | Stop reason: HOSPADM

## 2023-04-18 RX ORDER — PROMETHAZINE HYDROCHLORIDE 12.5 MG/1
25 TABLET ORAL ONCE AS NEEDED
Status: DISCONTINUED | OUTPATIENT
Start: 2023-04-18 | End: 2023-04-18 | Stop reason: HOSPADM

## 2023-04-18 RX ORDER — SODIUM CHLORIDE, SODIUM LACTATE, POTASSIUM CHLORIDE, CALCIUM CHLORIDE 600; 310; 30; 20 MG/100ML; MG/100ML; MG/100ML; MG/100ML
9 INJECTION, SOLUTION INTRAVENOUS CONTINUOUS PRN
Status: DISCONTINUED | OUTPATIENT
Start: 2023-04-18 | End: 2023-04-18 | Stop reason: HOSPADM

## 2023-04-18 RX ORDER — FENTANYL CITRATE 50 UG/ML
INJECTION, SOLUTION INTRAMUSCULAR; INTRAVENOUS AS NEEDED
Status: DISCONTINUED | OUTPATIENT
Start: 2023-04-18 | End: 2023-04-18 | Stop reason: SURG

## 2023-04-18 RX ORDER — OXYCODONE HYDROCHLORIDE 5 MG/1
5 TABLET ORAL
Status: DISCONTINUED | OUTPATIENT
Start: 2023-04-18 | End: 2023-04-18 | Stop reason: HOSPADM

## 2023-04-18 RX ORDER — ASPIRIN 325 MG
650 TABLET ORAL DAILY
COMMUNITY

## 2023-04-18 RX ORDER — ACETAMINOPHEN 500 MG
1000 TABLET ORAL ONCE
Status: COMPLETED | OUTPATIENT
Start: 2023-04-18 | End: 2023-04-18

## 2023-04-18 RX ADMIN — Medication 50 MCG: at 17:24

## 2023-04-18 RX ADMIN — MIDAZOLAM HYDROCHLORIDE 2 MG: 1 INJECTION, SOLUTION INTRAMUSCULAR; INTRAVENOUS at 15:05

## 2023-04-18 RX ADMIN — CEFAZOLIN SODIUM 2 G: 2 INJECTION, SOLUTION INTRAVENOUS at 16:42

## 2023-04-18 RX ADMIN — BUPIVACAINE HYDROCHLORIDE AND EPINEPHRINE BITARTRATE 30 ML: 5; .005 INJECTION, SOLUTION EPIDURAL; INTRACAUDAL; PERINEURAL at 15:27

## 2023-04-18 RX ADMIN — EPHEDRINE SULFATE 10 MG: 50 INJECTION INTRAVENOUS at 17:00

## 2023-04-18 RX ADMIN — SODIUM CHLORIDE, POTASSIUM CHLORIDE, SODIUM LACTATE AND CALCIUM CHLORIDE 9 ML/HR: 600; 310; 30; 20 INJECTION, SOLUTION INTRAVENOUS at 15:05

## 2023-04-18 RX ADMIN — OXYCODONE 5 MG: 5 TABLET ORAL at 18:23

## 2023-04-18 RX ADMIN — PROPOFOL 40 MG: 10 INJECTION, EMULSION INTRAVENOUS at 16:37

## 2023-04-18 RX ADMIN — DEXAMETHASONE SODIUM PHOSPHATE 4 MG: 4 INJECTION, SOLUTION INTRA-ARTICULAR; INTRALESIONAL; INTRAMUSCULAR; INTRAVENOUS; SOFT TISSUE at 16:48

## 2023-04-18 RX ADMIN — EPHEDRINE SULFATE 10 MG: 50 INJECTION INTRAVENOUS at 17:09

## 2023-04-18 RX ADMIN — EPHEDRINE SULFATE 10 MG: 50 INJECTION INTRAVENOUS at 16:43

## 2023-04-18 RX ADMIN — LIDOCAINE HYDROCHLORIDE 60 MG: 20 INJECTION, SOLUTION EPIDURAL; INFILTRATION; INTRACAUDAL; PERINEURAL at 16:33

## 2023-04-18 RX ADMIN — Medication 100 MCG: at 16:44

## 2023-04-18 RX ADMIN — EPHEDRINE SULFATE 10 MG: 50 INJECTION INTRAVENOUS at 16:45

## 2023-04-18 RX ADMIN — ONDANSETRON 4 MG: 2 INJECTION INTRAMUSCULAR; INTRAVENOUS at 17:30

## 2023-04-18 RX ADMIN — FENTANYL CITRATE 25 MCG: 50 INJECTION, SOLUTION INTRAMUSCULAR; INTRAVENOUS at 16:49

## 2023-04-18 RX ADMIN — PROPOFOL 100 MG: 10 INJECTION, EMULSION INTRAVENOUS at 16:33

## 2023-04-18 RX ADMIN — ACETAMINOPHEN 1000 MG: 500 TABLET ORAL at 15:05

## 2023-04-18 NOTE — H&P
"h and p      Chief Complaint  Initial Evaluation of the Right Shoulder       Subjective          Samira Kramer presents to [facility] for initial evaluation of the right shoulder. She fell from the bottom ring of the shoulder.  Her fall was on 4/13/23.  She went to the ER and had X rays completed and here in a sling.  She is here today for treatment.           Allergies   Allergen Reactions   • Aleve [Naproxen] Unknown - High Severity   • Atorvastatin Unknown - High Severity   • Fenofibrate Hives   • Omega 3-6-9 Fatty Acids Diarrhea   • Penicillins Unknown - High Severity   • Zetia [Ezetimibe] Rash         Social History            Socioeconomic History   • Marital status:    Tobacco Use   • Smoking status: Every Day       Packs/day: 1.00       Types: Cigarettes   • Smokeless tobacco: Never   Vaping Use   • Vaping Use: Never used   Substance and Sexual Activity   • Alcohol use: Never   • Drug use: Never   • Sexual activity: Defer         Review of Systems            Objective      Vital Signs:   Pulse 74   Ht 157.5 cm (62\")   Wt 51.7 kg (114 lb)   SpO2 98%   BMI 20.85 kg/m²        Physical Exam  Constitutional:       Appearance: Normal appearance. Patient is well-developed and normal weight.   HENT:      Head: Normocephalic.      Right Ear: Hearing and external ear normal.      Left Ear: Hearing and external ear normal.      Nose: Nose normal.   Eyes:      Conjunctiva/sclera: Conjunctivae normal.   Cardiovascular:      Rate and Rhythm: Normal rate.   Pulmonary:      Effort: Pulmonary effort is normal.      Breath sounds: No wheezing or rales.   Abdominal:      Palpations: Abdomen is soft.      Tenderness: There is no abdominal tenderness.   Musculoskeletal:      Cervical back: Normal range of motion.   Skin:     Findings: No rash.   Neurological:      Mental Status: Patient  is alert and oriented to person, place, and time.   Psychiatric:         Mood and Affect: " Mood and affect normal.         Judgment: Judgment normal.         Ortho Exam       RIGHT SHOULDER  Patient in a sling.  Sensation intact to light touch, median, radial, ulnar nerve. Positive AIN, PIN, ulnar nerve motor. Positive pulses. Good strength in wrist extensors and wrist flexors. Full , thumb opposition, MCP flexors, DIP flexors and PIP flexors.            Procedures               Imaging Results (Most Recent)      None                   Result Review    :            XR Shoulder 2+ View Right     Result Date: 4/13/2023  Narrative: PROCEDURE:       XR SHOULDER 2+ VW RIGHT  COMPARISON:     None  INDICATIONS:           Fall off ladder at home today x90 minutes ago, right anterior shoulder pain, no neck pain.  Unable to abduct right shoulder, pain with flexing lower right forea  FINDINGS:      There is a comminuted and impacted fracture of the proximal humerus.  The glenoid is intact.  The AC joint is congruent.       Impression:     Comminuted and impacted proximal right humeral fracture.      FERMIN BARRETT MD       Electronically Signed and Approved By: FERMIN BARRETT MD on 4/13/2023 at 14:39                          Assessment      Assessment and Plan      Diagnoses and all orders for this visit:     1. Closed 3-part fracture of proximal end of right humerus, initial encounter (Primary)           Discussed the treatment plan with the patient. I reviewed the X-rays that were obtained 4/13/23 with the patient.  Discussed the treatment options with the patient, operative vs non-operative. The patient expressed understanding and wished to proceed with a proximal humerus ORIF.        Educated on risk of smoking. Discussed options for smoking cessation., Discussed surgery., Risks/benefits discussed with patient including, but not limited to: infection, bleeding, neurovascular damage, re-rupture, aesthetic deformity, need for further surgery, and death., Discussed with patient the implant type being used during  surgery and patient understands., Surgery pamphlet given. and Call or return if worsening symptoms.     Follow Up      2 weeks postoperative  Keshawn Arellano MD  04/18/23

## 2023-04-18 NOTE — TELEPHONE ENCOUNTER
Caller: Samira Kramer    Relationship: Self    Best call back number: [phone]    What orders are you requesting (i.e. lab or imaging): PHYSICAL THERAPY    Additional notes: BRITTANIE FROM [facility] CALLING ON BEHALF OF THE PT TO FIND OUT IF PT WILL NEED PHYSICAL THERAPY AND HOW THE PROCESS IS . PLEASE CALL BACK [phone]

## 2023-04-18 NOTE — ANESTHESIA PROCEDURE NOTES
Peripheral Block      Patient reassessed immediately prior to procedure    Patient location during procedure: pre-op  Start time: 4/18/2023 3:27 PM  Stop time: 4/18/2023 3:30 PM  Reason for block: at surgeon's request and post-op pain management  Preanesthetic Checklist  Completed: patient identified, IV checked, site marked, risks and benefits discussed, surgical consent, monitors and equipment checked, pre-op evaluation and timeout performed  Prep:  Pt Position: supine (HOB elevated)  Sterile barriers:cap, washed/disinfected hands, sterile barriers, gloves, mask, partial drape and alcohol skin prep  Prep: ChloraPrep  Patient monitoring: blood pressure monitoring, continuous pulse oximetry and EKG  Procedure    Sedation: yes  Performed under: local infiltration  Guidance:ultrasound guided    ULTRASOUND INTERPRETATION.  Using ultrasound guidance a 22 G gauge needle was placed in close proximity to the brachial plexus nerve, at which point, under ultrasound guidance anesthetic was injected in the area of the nerve and spread of the anesthesia was seen on ultrasound in close proximity thereto.  There were no abnormalities seen on ultrasound; a digital image was taken; and the patient tolerated the procedure with no complications. Images:still images obtained, printed/placed on chart    Laterality:right  Block Type:interscalene  Injection Technique:single-shot  Needle Type:echogenic  Needle Gauge:22 G (2in)  Resistance on Injection: none    Medications Used: bupivacaine-EPINEPHrine PF (MARCAINE w/EPI) 0.5% -[id number] injection - Injection   30 mL - 4/18/2023 3:27:00 PM      Post Assessment  Injection Assessment: negative aspiration for heme, no paresthesia on injection and incremental injection  Patient Tolerance:comfortable throughout block  Complications:no  Additional Notes  The block or continuous infusion is requested by the referring physician for management of postoperative pain, or pain related to a procedure.  Ultrasound guidance (deemed medically necessary). Painless injection, pt was awake and conversant during the procedure without complications. Needle and surrounding structures visualized throughout procedure. No adverse reactions or complications seen during this period. Post-procedure image showed no signs of complication, and anatomy was consistent with an uncomplicated nerve blockade.

## 2023-04-18 NOTE — DISCHARGE INSTRUCTIONS
DISCHARGE INSTRUCTIONS  ORTHOPEDICS      For your surgery you had:  Local anesthesia  Monitored anesthesia care  You may experience dizziness, drowsiness, or light-headedness for several hours following surgery  Do not stay alone today or tonight.  Limit your activity for 24 hours.  Resume your diet slowly.  Follow whatever special dietary instructions you may have been given by the doctor.  You should not drive or operate machinery or drink alcohol for 24 hours or while you are taking pain medication.  You should not sign any legally binding documents.  If you had an axillary or regional block, you will not have control of the involved limb for up to 12 hours.  Protect the arm with a sling or follow your physician's specific instructions.  Sleep with the injured part elevated on a pillow.  Follow verbal instructions of your doctor.  Carry the upper arm in a sling so that the hand and wrist are above the level of the heart.  Exercise fingers for 10 minutes every hour while awake. Ice bag to injured area for 72 hours.  Apply 20 minutes on - 20 minutes off.  Never place ice directly on skin or cast.    Avoid getting cast or dressing wet.      SPECIAL INSTRUCTIONS:  May shower/bathe tomorrow. Do not submerge incision.  Remove dressing on Friday. Keep clean and dry. Keep covered until seen at follow up.  Keep arm in sling until seen at follow up.      Last dose of pain medication was given at:   Tylenol (1000mg) last at 3pm. Do not exceed 4000mg of tylenol in a 24 hour period.  Oxycodone last at 6:23pm. May take norco next at 10:23pm if needed.      NOTIFY THE PHYSICIAN IF YOU EXPERIENCE:  Numbness of fingers.  Inability to move fingers.  Extreme coldness, paleness or blue dis-coloration of fingers.  Excessive swelling of affected surgical site or swelling that causes the cast to rub or cut into skin.  Pain unrelieved by pain medication  Nausea/vomiting not relieved by prescribed medication  Unable to urinate in 6  hours after surgery  Temperature greater than 101 degree Fahrenheit or chills  If unable to reach your doctor, please go to the closest emergency room

## 2023-04-18 NOTE — ANESTHESIA PREPROCEDURE EVALUATION
Anesthesia Evaluation     Patient summary reviewed and Nursing notes reviewed   no history of anesthetic complications:  NPO Solid Status: > 8 hours  NPO Liquid Status: > 2 hours           Airway   Mallampati: II  TM distance: >3 FB  Neck ROM: full  No difficulty expected  Dental    (+) poor dentition        Pulmonary - normal exam    breath sounds clear to auscultation  (+) a smoker Current,   Cardiovascular - normal exam  Exercise tolerance: good (4-7 METS)    Rhythm: regular  Rate: normal    (+) hyperlipidemia,     ROS comment: Took ASA 650mg yesterday    Neuro/Psych- negative ROS  GI/Hepatic/Renal/Endo    (+)   thyroid problem hypothyroidism    Musculoskeletal     Abdominal    Substance History      OB/GYN          Other        ROS/Med Hx Other: PAT Nursing Notes unavailable.                    Anesthesia Plan    ASA 2     general with block     (Patient understands anesthesia not responsible for dental damage. Regional anesthesia options discussed with patient. Pt accepts regional block.)  intravenous induction     Anesthetic plan, risks, benefits, and alternatives have been provided, discussed and informed consent has been obtained with: patient.    Plan discussed with CRNA.        CODE STATUS:

## 2023-04-19 NOTE — OP NOTE
HUMERUS PROXIMAL OPEN REDUCTION INTERNAL FIXATION  Procedure Report    Patient Name:  Samira Kramer  YOB: 1950    Date of Surgery:  4/18/2023     Indications: See H&P    Pre-op Diagnosis:   Closed 3-part fracture of proximal end of right humerus, initial encounter [S42.291A]       Post-Op Diagnosis Codes:     * Closed 3-part fracture of proximal end of right humerus, initial encounter [S42.291A]    Procedure/CPT® Codes:      Procedure(s):  Open reduction internal fixation of a right three-part proximal humerus fracture          Staff:  Surgeon(s):  Keshawn Arellano MD    Assistant: Valerie Pepper RN; Héctor Giang    Anesthesia: Choice    Estimated Blood Loss: 10 mL    Implants:    Implant Name Type Inv. Item Serial No.  Lot No. LRB No. Used Action   SUT FW #2 W/TPR NDL 1/2 CIR 38IN 97CM 26.5MM MILY - [id number] Implant SUT FW #2 W/TPR NDL 1/2 CIR 38IN 97CM 26.5MM MILY  [id number] Right 1 Implanted   PLT HUM PROX LO 3H 73MM RT - [id number] Implant PLT HUM PROX LO 3H 73MM RT  [employer]  Right 1 Implanted   SCRW LP T15 NL 3.5X24MM - [id number] Implant SCRW LP T15 NL 3.5X24MM  [employer]  Right 1 Implanted   SCRW MAYURI LK 3.5X20MM - [id number] Implant SCRW MAYURI LK 3.5X20MM  [employer]  Right 1 Implanted   SCRW MAYURI LK 3.5X22MM - [id number] Implant SCRW MAYURI LK 3.5X22MM  [employer]  Right 1 Implanted   SCRW MAYURI LK 3.5X34MM - [id number] Implant SCRW MAYURI LK 3.5X34MM  [employer]  Right 1 Implanted   SCRW MAYURI LK 3.5X36MM - [id number] Implant SCRW MAYURI LK 3.5X36MM  [employer]  Right 1 Implanted   SCRW MAYURI LK 3.5X38MM - [id number] Implant SCRW MAYURI LK 3.5X38MM  [employer]  Right 1 Implanted   SCRW MAYURI LK 3.5X40MM - [id number] Implant SCRW MAYURI LK 3.5X40MM  [employer]  Right 1 Implanted       Specimen:          None        Findings: See description    Complications: None    Description of Procedure: Patient was taken the operating room placed supine on the  operating table after interscalene blocks and in preoperative holding by anesthesia general endotracheal anesthesia was established the right shoulder and upper extremity were prepped and draped in a standard surgical fashion using alcohol and ChloraPrep.  Deltopectoral incision was made approximately 10 cm in length dissection was carried down protecting the cephalic vein laterally as deep retractors were placed in the lateral border of the coracobrachialis tendon sheath was incised.  Superior third of the pectoralis major tendon was released off the proximal humerus the fracture site was repaired using a Canales knife curette irrigation temporary duction was held with FiberWire suture to rotate the greater tuberosity piece anteriorly after that was done a Jen proximal humeral locking plate was placed in the proximal humerus in appropriate position verified on C arm and locked proximally initially with 1 screw.  Then a cortical screw was placed distal to the fracture site compressing the plate to the bone.  Locking screws were filled proximally and distally as needed and C-arm shots revealed anatomic alignment of the fracture and no complications from the implant wound was copiously irrigated and the deltopectoral groove was closed with 0 Vicryl deep fat was closed with 0 Vicryl the subcu was closed with 2-0 Vicryl and the skin was closed with staples.  Incision was washed and dried and sterile dressings were applied the patient tolerated the procedure well was extubated and taken to the recovery room.        Keshawn Arellano MD     Date: 4/19/2023  Time: 07:23 EDT

## 2023-04-19 NOTE — TELEPHONE ENCOUNTER
I spoke with Dr. Arellano and he says that right now she does not need physical therapy.  He said that he showed patient and whoever was in room with patient what exercises to do, passive Codmans.  Tried calling, no answer.  Okay to relay message if they call back.

## 2023-04-19 NOTE — ANESTHESIA POSTPROCEDURE EVALUATION
Patient: Samira Kramer    Procedure Summary     Date: 04/18/23 Room / Location: [facility] OR 06 / [facility] MAIN OR    Anesthesia Start: 1632 Anesthesia Stop: 1739    Procedure: HUMERUS PROXIMAL OPEN REDUCTION INTERNAL FIXATION (Right: Arm Upper) Diagnosis:       Closed 3-part fracture of proximal end of right humerus, initial encounter      (Closed 3-part fracture of proximal end of right humerus, initial encounter [S42.291A])    Surgeons: Keshawn Arellano MD Provider: Antonio Jeter MD    Anesthesia Type: general with block ASA Status: 2          Anesthesia Type: general with block    Vitals  Vitals Value Taken Time   /62 04/18/23 1820   Temp 36.2 °C (97.1 °F) 04/18/23 1820   Pulse 79 04/18/23 1827   Resp 17 04/18/23 1820   SpO2 93 % 04/18/23 1827   Vitals shown include unvalidated device data.        Post Anesthesia Care and Evaluation    Patient location during evaluation: bedside  Patient participation: complete - patient participated  Level of consciousness: awake  Pain management: adequate    Airway patency: patent  PONV Status: none  Cardiovascular status: acceptable and stable  Respiratory status: acceptable  Hydration status: acceptable    Comments: An Anesthesiologist personally participated in the most demanding procedures (including induction and emergence if applicable) in the anesthesia plan, monitored the course of anesthesia administration at frequent intervals and remained physically present and available for immediate diagnosis and treatment of emergencies.            
pt with c/o headache for 6 days, reports it is behind the eyes and in the middle of her head. pt states she has headaches every single day, takes Excedrin but this one is unrelieved. denies weakness, denies any new vision changes, reports her vision typically goes blurry on occasion. pt reports hx occasional vertigo but nothing new.

## 2023-04-27 ENCOUNTER — TELEPHONE (OUTPATIENT)
Dept: CASE MANAGEMENT | Facility: OTHER | Age: 73
End: 2023-04-27
Payer: MEDICARE

## 2023-04-28 ENCOUNTER — PATIENT OUTREACH (OUTPATIENT)
Dept: CASE MANAGEMENT | Facility: OTHER | Age: 73
End: 2023-04-28
Payer: MEDICARE

## 2023-04-28 ENCOUNTER — TELEPHONE (OUTPATIENT)
Dept: CASE MANAGEMENT | Facility: OTHER | Age: 73
End: 2023-04-28
Payer: MEDICARE

## 2023-04-28 DIAGNOSIS — E78.2 MIXED HYPERLIPIDEMIA: Primary | ICD-10-CM

## 2023-04-28 DIAGNOSIS — S42.201A CLOSED FRACTURE OF PROXIMAL END OF RIGHT HUMERUS, UNSPECIFIED FRACTURE MORPHOLOGY, INITIAL ENCOUNTER: ICD-10-CM

## 2023-04-28 NOTE — OUTREACH NOTE
AMBULATORY CASE MANAGEMENT NOTE    Name and Relationship of Patient/Support Person: Samira Kramer Nithya - Self  Self    Samira reports fall from ladder on 4/13/23 that resulted in fracture of right humerus. Surgical repair done 4/18/23. She is able to perform ADL's. 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  Does have some concerns that she does not have much movement in her elbow. She has been doing the exercises discussed at discharge but not daily. She has follow up with Orthopedics on 5/1/23. I encouraged her to discuss this with them as PT may be an option.     Education Documentation  No documentation found.        Pat PUENTES  Ambulatory Case Management    4/28/2023, 10:16 EDT

## 2023-04-28 NOTE — TELEPHONE ENCOUNTER
Left message on voicemail to return my call. Will plan to discharge next month if no calls or needs arise.

## 2023-05-01 ENCOUNTER — OFFICE VISIT (OUTPATIENT)
Dept: ORTHOPEDIC SURGERY | Facility: CLINIC | Age: 73
End: 2023-05-01
Payer: MEDICARE

## 2023-05-01 VITALS — WEIGHT: 120 LBS | HEIGHT: 62 IN | BODY MASS INDEX: 22.08 KG/M2

## 2023-05-01 DIAGNOSIS — S42.291D CLOSED 3-PART FRACTURE OF PROXIMAL END OF RIGHT HUMERUS WITH ROUTINE HEALING, SUBSEQUENT ENCOUNTER: ICD-10-CM

## 2023-05-01 DIAGNOSIS — M25.511 RIGHT SHOULDER PAIN, UNSPECIFIED CHRONICITY: Primary | ICD-10-CM

## 2023-05-01 RX ORDER — HYDROCODONE BITARTRATE AND ACETAMINOPHEN 7.5; 325 MG/1; MG/1
1 TABLET ORAL EVERY 4 HOURS PRN
Qty: 42 TABLET | Refills: 0 | Status: SHIPPED | OUTPATIENT
Start: 2023-05-01

## 2023-05-01 NOTE — TELEPHONE ENCOUNTER
Patient seen in office today by MICHELLE Sanabria.  Requesting refill of pain medication. [facility], [place]

## 2023-05-01 NOTE — PROGRESS NOTES
"Chief Complaint  Follow-up and Pain of the Right Shoulder and Suture / Staple Removal    Subjective      Samira Nithya Kramer presents to [facility] for follow-up of open reduction and internal fixation of a right three-part proximal humerus fracture performed by Dr. Arellano on 4/18/2023.  Patient doing well considering her circumstances.  She does need a refill on pain medication today.  She has not yet started therapy.    Objective   Allergies   Allergen Reactions   • Fenofibrate Hives   • Aleve [Naproxen] GI Intolerance   • Atorvastatin Myalgia   • Nsaids GI Intolerance   • Omega 3-6-9 Fatty Acids Diarrhea   • Penicillins Hives   • Zetia [Ezetimibe] Rash       Vital Signs:   Ht 157.5 cm (62\")   Wt 54.4 kg (120 lb)   BMI 21.95 kg/m²       Physical Exam    Constitutional: Awake, alert. Well nourished appearance.    Integumentary: Warm, dry, intact. No obvious rashes.    HENT: Atraumatic, normocephalic.   Respiratory: Non labored respirations .   Cardiovascular: Intact peripheral pulses.    Psychiatric: Normal mood and affect. A&O X3    Ortho Exam  Incision is healing appropriately, Steri-Strips in place.  Incision is well approximated, no redness, drainage, tenderness to the touch.  Patient is able to perform pendulums.  She has full range of motion of her elbow.  She has full range of motion of her wrist to flexion, extension, supination and pronation.  She can make a tight fist.  Thumb to finger opposition is intact, capillary refill time is brisk.  Sensation is intact.    Imaging Results (Most Recent)     Procedure Component Value Units Date/Time    XR Scapula Right [[id number]] Resulted: 05/01/23 1120     Updated: 05/01/23 1120    Narrative:      X-Ray Report:  Study: X-rays ordered, taken in the office, and reviewed today.   Site: Right scapula xray  Indication: Proximal humerus fracture  View: AP/Lateral view(s)  Findings: Intact right humerus ORIF. No evidence of hardware " malfunction.    Good alignment noted.  No loosening of hardware.  Prior studies available for comparison: yes                       Assessment and Plan   Problem List Items Addressed This Visit        Musculoskeletal and Injuries    Closed 3-part fracture of proximal end of right humerus    Overview     Added automatically from request for surgery [id number]        Other Visit Diagnoses     Right shoulder pain, unspecified chronicity    -  Primary    Relevant Orders    XR Scapula Right (Completed)    Ambulatory Referral to Home Health (Outpatient) (Completed)          Follow Up   Return in about 4 weeks (around 5/29/2023).    Educated on risk of smoking. Discussed options for smoking cessation.     Social History     Socioeconomic History   • Marital status:    Tobacco Use   • Smoking status: Every Day     Packs/day: 1.00     Types: Cigarettes   • Smokeless tobacco: Never   • Tobacco comments:     INST PER ANESTHESIA PROTOCOL, smoked last yesterday   Vaping Use   • Vaping Use: Never used   Substance and Sexual Activity   • Alcohol use: Never   • Drug use: Never   • Sexual activity: Defer       Patient Instructions   Sutures removed in office today. Steri-strips applied. Patient educated on incision care. May shower, but do not submerge in water until fully healed. Do not apply creams or lotions. Allow steri-strips to fall off on their own in 7-10 days.     Continue sling for the next 4 weeks. May remove for hygiene.     Advised on pendulums, gentle range of motion exercises to the elbow, wrist, and hand/fingers. No active range of motion of the shoulder. No lifting, pushing, or pulling.     Order sent for PT/OT to start passive stretching/gentle range of motion only.     Follow-up in 4 weeks. No x-rays needed.   Call with any changes or concerns.     Patient was given instructions and counseling regarding her condition or for health maintenance advice. Please see specific information pulled into the AVS if  appropriate.

## 2023-05-01 NOTE — PATIENT INSTRUCTIONS
Sutures removed in office today. Steri-strips applied. Patient educated on incision care. May shower, but do not submerge in water until fully healed. Do not apply creams or lotions. Allow steri-strips to fall off on their own in 7-10 days.     Continue sling for the next 4 weeks. May remove for hygiene.     Advised on pendulums, gentle range of motion exercises to the elbow, wrist, and hand/fingers. No active range of motion of the shoulder. No lifting, pushing, or pulling.     Order sent for PT/OT to start passive stretching/gentle range of motion only.     Follow-up in 4 weeks. No x-rays needed.   Call with any changes or concerns.

## 2023-05-09 LAB — QT INTERVAL: 376 MS

## 2023-05-10 ENCOUNTER — TELEPHONE (OUTPATIENT)
Dept: FAMILY MEDICINE CLINIC | Facility: CLINIC | Age: 73
End: 2023-05-10
Payer: MEDICARE

## 2023-05-10 NOTE — TELEPHONE ENCOUNTER
Caller: Samira Kramer    Relationship: Self    Best call back number: [phone]    What is the best time to reach you: ANY    Who are you requesting to speak with (clinical staff, provider,  specific staff member): CLINICAL    What was the call regarding: PATIENT CALLED AND ASKED IF SHE COULD SPEAK TO THE DIETICIAN?  PATIENT STATED THAT SHE HAS BEEN HAVING [facility] AT NIGHT FOR A COUPLE OF WEEKS AND WOULD LIKE A CALL BACK TO DISCUSS THIS.    Do you require a callback: YES

## 2023-05-11 NOTE — TELEPHONE ENCOUNTER
Patient said she is trying some things on her on to help with this. And she has home health that will be coming to see her next week.

## 2023-05-16 DIAGNOSIS — M25.511 RIGHT SHOULDER PAIN, UNSPECIFIED CHRONICITY: ICD-10-CM

## 2023-05-16 DIAGNOSIS — S42.291D CLOSED 3-PART FRACTURE OF PROXIMAL END OF RIGHT HUMERUS WITH ROUTINE HEALING, SUBSEQUENT ENCOUNTER: Primary | ICD-10-CM

## 2023-05-18 ENCOUNTER — TELEPHONE (OUTPATIENT)
Dept: CASE MANAGEMENT | Facility: OTHER | Age: 73
End: 2023-05-18
Payer: MEDICARE

## 2023-05-18 ENCOUNTER — TELEPHONE (OUTPATIENT)
Dept: ORTHOPEDIC SURGERY | Facility: CLINIC | Age: 73
End: 2023-05-18
Payer: MEDICARE

## 2023-05-18 NOTE — TELEPHONE ENCOUNTER
"RECEIVED CALL FROM EMILE, PT WITH [facility]. SHE STATES PATIENT DECLINED VISIT TODAY. PATIENT IS READY TO DISCHARGE FROM HOME HEALTH PT AND HAS OUTPATIENT PT APPT SET UP FOR NEXT Tuesday. SHE REPORTS THAT PATIENT HAS BEEN NON-COMPLIANT WITH MOBILITY RESTRICTIONS IN REGARDS TO BEING S/P R HUMERUS ORIF. 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 EMILE STATES SHE IS SCHEDULED TO SEE PATIENT AGAIN Monday 05/22/23 AND WILL D/C [facility] THEN IF PATIENT ACCEPTS VISIT.   "

## 2023-05-18 NOTE — TELEPHONE ENCOUNTER
Left message on voicemail to return my call.     Chart review done. [facility] PT in the home. Note in chart that they will discharge next week as Samira has appt with outpaitient PT next Tuesday. Recent Orthopedic visit 5/1/23 with f/u scheduled 6/5/23.  Next PCP visit 7/11/23.

## 2023-05-22 ENCOUNTER — TELEPHONE (OUTPATIENT)
Dept: ORTHOPEDIC SURGERY | Facility: CLINIC | Age: 73
End: 2023-05-22
Payer: MEDICARE

## 2023-05-22 DIAGNOSIS — S42.291D CLOSED 3-PART FRACTURE OF PROXIMAL END OF RIGHT HUMERUS WITH ROUTINE HEALING, SUBSEQUENT ENCOUNTER: ICD-10-CM

## 2023-05-22 RX ORDER — HYDROCODONE BITARTRATE AND ACETAMINOPHEN 7.5; 325 MG/1; MG/1
1 TABLET ORAL EVERY 6 HOURS PRN
Qty: 28 TABLET | Refills: 0 | Status: SHIPPED | OUTPATIENT
Start: 2023-05-22

## 2023-06-05 ENCOUNTER — OFFICE VISIT (OUTPATIENT)
Dept: ORTHOPEDIC SURGERY | Facility: CLINIC | Age: 73
End: 2023-06-05
Payer: MEDICARE

## 2023-06-05 VITALS — BODY MASS INDEX: 22.08 KG/M2 | OXYGEN SATURATION: 99 % | HEIGHT: 62 IN | HEART RATE: 70 BPM | WEIGHT: 120 LBS

## 2023-06-05 DIAGNOSIS — S42.294D OTHER CLOSED NONDISPLACED FRACTURE OF PROXIMAL END OF RIGHT HUMERUS WITH ROUTINE HEALING, SUBSEQUENT ENCOUNTER: Primary | ICD-10-CM

## 2023-06-05 DIAGNOSIS — Z47.89 AFTERCARE FOLLOWING SURGERY OF THE MUSCULOSKELETAL SYSTEM: ICD-10-CM

## 2023-06-05 PROCEDURE — 1160F RVW MEDS BY RX/DR IN RCRD: CPT

## 2023-06-05 PROCEDURE — 99024 POSTOP FOLLOW-UP VISIT: CPT

## 2023-06-05 PROCEDURE — 1159F MED LIST DOCD IN RCRD: CPT

## 2023-06-05 NOTE — PATIENT INSTRUCTIONS
X-rays taken and reviewed today with patient showing intact hardware and stable fracture with routine healing.    Patient may discontinue sling use. Advised still no active ROM of the shoulder, until directed by PT. continue physical therapy and patient may start active assist ROM/exercises. No lifting, pushing, or pulling. Nothing heavier than 1 lb in affected arm.    Continue icing shoulder up to 3-4 times daily for no longer than 15 to 20 minutes at a time as needed for pain or swelling.    Follow-up in 6 weeks.  Call with changes or concerns.  Imaging needed on this appointment.

## 2023-06-05 NOTE — PROGRESS NOTES
"Chief Complaint  Follow-up of the Right Shoulder    Subjective      Samira Nithya Kramer presents to [facility] for follow-up of open reduction and internal fixation of a right three-part proximal humerus fracture with Dr. Arellano on 4/18/2023.  Patient reports that she is attending physical therapy, however they are not doing much with her shoulder.  Reports that they have done some passive range of motion forward shoulder flexion and working the elbow and the wrist, however they have not abducted, external or internally rotated the shoulder.  Pain wise she is doing well and she presents today with use of a sling.    Objective   Allergies   Allergen Reactions    Fenofibrate Hives    Aleve [Naproxen] GI Intolerance    Atorvastatin Myalgia    Nsaids GI Intolerance    Omega 3-6-9 Fatty Acids Diarrhea    Penicillins Hives    Zetia [Ezetimibe] Rash       Vital Signs:   Pulse 70   Ht 157.5 cm (62\")   Wt 54.4 kg (120 lb)   SpO2 99%   BMI 21.95 kg/m²       Physical Exam    Constitutional: Awake, alert. Well nourished appearance.    Integumentary: Warm, dry, intact. No obvious rashes.    HENT: Atraumatic, normocephalic.   Respiratory: Non labored respirations .   Cardiovascular: Intact peripheral pulses.    Psychiatric: Normal mood and affect. A&O X3    Ortho Exam  Right shoulder: Passive range of motion forward shoulder flexion to 90 degrees, abduction 90 degrees, external rotation 30 degrees, internal rotation to her back pocket.  Full range of motion of the elbow with stiffness.  Full range of motion of the wrist and hand.  Tender to palpation at the fracture site.    Imaging Results (Most Recent)       Procedure Component Value Units Date/Time    XR Scapula Right [[id number]] Resulted: 06/05/23 1237     Updated: 06/05/23 1238    Narrative:      X-Ray Report:  Study: X-rays ordered, taken in the office, and reviewed today.   Site: Right scapula xray  Indication: Right proximal humerus " ORIF  View: AP/Lateral view(s)  Findings: Intact proximal humerus ORIF. No evidence of hardware   malfunction.  All screws and plate is in place.  Stable alignment of   fracture with adequate bone healing noted.  Prior studies available for comparison: yes                       Assessment and Plan   Problem List Items Addressed This Visit          Musculoskeletal and Injuries    Closed fracture of right proximal humerus - Primary    Relevant Orders    Ambulatory Referral to Physical Therapy Evaluate and treat, POST OP; Stretching, ROM, Strengthening (Completed)    XR Scapula Right (Completed)     Other Visit Diagnoses       Aftercare following right proximal humerus ORIF                Follow Up   Return in about 6 weeks (around 7/17/2023).    Patient is a smoker, please discuss smoking cessation options with primary care provider.    Social History     Socioeconomic History    Marital status:    Tobacco Use    Smoking status: Every Day     Packs/day: 1.00     Types: Cigarettes    Smokeless tobacco: Never    Tobacco comments:     INST PER ANESTHESIA PROTOCOL, smoked last yesterday   Vaping Use    Vaping Use: Never used   Substance and Sexual Activity    Alcohol use: Never    Drug use: Never    Sexual activity: Defer       Patient Instructions   X-rays taken and reviewed today with patient showing intact hardware and stable fracture with routine healing.    Patient may discontinue sling use. Advised still no active ROM of the shoulder, until directed by PT. continue physical therapy and patient may start active assist ROM/exercises. No lifting, pushing, or pulling. Nothing heavier than 1 lb in affected arm.    Continue icing shoulder up to 3-4 times daily for no longer than 15 to 20 minutes at a time as needed for pain or swelling.    Follow-up in 6 weeks.  Call with changes or concerns.  Imaging needed on this appointment.  Patient was given instructions and counseling regarding her condition or for health  maintenance advice. Please see specific information pulled into the AVS if appropriate.

## 2023-06-14 ENCOUNTER — TELEPHONE (OUTPATIENT)
Dept: ORTHOPEDIC SURGERY | Facility: CLINIC | Age: 73
End: 2023-06-14
Payer: MEDICARE

## 2023-06-14 DIAGNOSIS — S42.291D CLOSED 3-PART FRACTURE OF PROXIMAL END OF RIGHT HUMERUS WITH ROUTINE HEALING, SUBSEQUENT ENCOUNTER: ICD-10-CM

## 2023-06-14 RX ORDER — HYDROCODONE BITARTRATE AND ACETAMINOPHEN 7.5; 325 MG/1; MG/1
1 TABLET ORAL EVERY 6 HOURS PRN
Qty: 28 TABLET | Refills: 0 | Status: SHIPPED | OUTPATIENT
Start: 2023-06-14

## 2023-06-14 NOTE — TELEPHONE ENCOUNTER
Caller: Williams Samirashaq Barriga    Relationship: Self    Best call back number: [phone]    Requested Prescriptions:   Requested Prescriptions     Pending Prescriptions Disp Refills    HYDROcodone-acetaminophen (Norco) 7.5-325 MG per tablet 28 tablet 0     Sig: Take 1 tablet by mouth Every 6 (Six) Hours As Needed for Moderate Pain.        Pharmacy where request should be sent: [facility] [address] [place] [address] [phone] [facility] [phone]      Last office visit with prescribing clinician: 4/17/2023   Last telemedicine visit with prescribing clinician: Visit date not found   Next office visit with prescribing clinician: Visit date not found     Additional details provided by patient:     Does the patient have less than a 3 day supply:  [x] Yes  [] No    Would you like a call back once the refill request has been completed: [] Yes [] No    If the office needs to give you a call back, can they leave a voicemail: [x] Yes [] No    Yi Pritchard   06/14/23 09:03 EDT

## 2023-07-11 PROBLEM — F51.01 PRIMARY INSOMNIA: Status: ACTIVE | Noted: 2023-07-11

## 2023-07-11 PROBLEM — M25.511 ACUTE PAIN OF RIGHT SHOULDER: Status: ACTIVE | Noted: 2023-07-11

## 2023-07-24 RX ORDER — MONTELUKAST SODIUM 4 MG/1
TABLET, CHEWABLE ORAL
Qty: 180 TABLET | Refills: 0 | Status: SHIPPED | OUTPATIENT
Start: 2023-07-24

## 2023-08-28 ENCOUNTER — OFFICE VISIT (OUTPATIENT)
Dept: ORTHOPEDIC SURGERY | Facility: CLINIC | Age: 73
End: 2023-08-28
Payer: MEDICARE

## 2023-08-28 VITALS
DIASTOLIC BLOOD PRESSURE: 78 MMHG | WEIGHT: 107 LBS | OXYGEN SATURATION: 98 % | SYSTOLIC BLOOD PRESSURE: 122 MMHG | HEIGHT: 62 IN | HEART RATE: 70 BPM | BODY MASS INDEX: 19.69 KG/M2

## 2023-08-28 DIAGNOSIS — M25.511 RIGHT SHOULDER PAIN, UNSPECIFIED CHRONICITY: Primary | ICD-10-CM

## 2023-08-28 DIAGNOSIS — Z47.89 AFTERCARE FOLLOWING SURGERY OF THE MUSCULOSKELETAL SYSTEM: ICD-10-CM

## 2023-08-28 RX ORDER — TRAMADOL HYDROCHLORIDE 50 MG/1
50 TABLET ORAL EVERY 6 HOURS PRN
Qty: 28 TABLET | Refills: 0 | Status: SHIPPED | OUTPATIENT
Start: 2023-08-28

## 2023-09-01 NOTE — PATIENT INSTRUCTIONS
X-rays taken reviewed with patient showing a near completely healed fracture and stable hardware.  Discussed with patient continuing physical therapy and she feels that she is continuing to make improvements with physical therapy.  Updated order placed today in office.    Follow-up in 6 weeks to evaluate progress with physical therapy.  X-rays not needed at this appointment unless patient is having problems.  Call for questions, concerns or worsening symptoms.

## 2023-09-01 NOTE — PROGRESS NOTES
"Chief Complaint  Follow-up and Pain of the Right Shoulder    Subjective      Samira Nithya Kramer presents to [facility] for follow-up of right proximal humerus ORIF with Dr. Arellano on 4/18/2023.  Patient reports that she is still having some pain under her arm and her upper arm is  to palpation.  Reports that she has made great improvements in her range of motion and her pain with physical therapy.  She is still feeling a \"catch in her arm\", but feels this is just \"the way it is now\".      Objective   Allergies   Allergen Reactions    Fenofibrate Hives    Aleve [Naproxen] GI Intolerance    Atorvastatin Myalgia    Nsaids GI Intolerance    Omega 3-6-9 Fatty Acids Diarrhea    Penicillins Hives    Zetia [Ezetimibe] Rash       Vital Signs:   /78   Pulse 70   Ht 157.5 cm (62\")   Wt 48.5 kg (107 lb)   SpO2 98%   BMI 19.57 kg/mý       Physical Exam    Constitutional: Awake, alert. Well nourished appearance.    Integumentary: Warm, dry, intact. No obvious rashes.    HENT: Atraumatic, normocephalic.   Respiratory: Non labored respirations .   Cardiovascular: Intact peripheral pulses.    Psychiatric: Normal mood and affect. A&O X3    Ortho Exam  Right shoulder: Active range of motion forward shoulder flexion 130 degrees, abduction 130 degrees, internal rotation to her back pocket, external rotation 45 degrees.  Full range of motion of the elbow and the wrist.  Sensation is intact throughout.    Imaging Results (Most Recent)       Procedure Component Value Units Date/Time    XR Scapula Right [[id number]] Resulted: 08/30/23 0804     Updated: 08/30/23 0804    Narrative:      X-Ray Report:  Study: X-rays ordered, taken in the office, and reviewed today.   Site: Right scapula xray  Indication: Proximal humerus ORIF  View: AP/Lateral view(s)  Findings: Proximal humerus ORIF with stable alignment and near complete   healing. No evidence of hardware malfunction.   Prior studies " available for comparison: yes                       Assessment and Plan   Problem List Items Addressed This Visit    None  Visit Diagnoses       Right shoulder pain, unspecified chronicity    -  Primary    Relevant Orders    XR Scapula Right (Completed)    Ambulatory Referral to Physical Therapy POST OP, Evaluate and treat; Stretching, ROM, Strengthening (Completed)    Aftercare following right proximal humerus ORIF        Relevant Orders    Ambulatory Referral to Physical Therapy POST OP, Evaluate and treat; Stretching, ROM, Strengthening (Completed)            Follow Up   Return if symptoms worsen or fail to improve.    Patient is a smoker, please discuss smoking cessation options with your primary care provider.    Social History     Socioeconomic History    Marital status:    Tobacco Use    Smoking status: Every Day     Packs/day: 1.00     Types: Cigarettes    Smokeless tobacco: Never    Tobacco comments:     INST PER ANESTHESIA PROTOCOL, smoked last yesterday   Vaping Use    Vaping Use: Never used   Substance and Sexual Activity    Alcohol use: Never    Drug use: Never    Sexual activity: Defer       Patient Instructions   X-rays taken reviewed with patient showing a near completely healed fracture and stable hardware.  Discussed with patient continuing physical therapy and she feels that she is continuing to make improvements with physical therapy.  Updated order placed today in office.    Follow-up in 6 weeks to evaluate progress with physical therapy.  X-rays not needed at this appointment unless patient is having problems.  Call for questions, concerns or worsening symptoms.  Patient was given instructions and counseling regarding her condition or for health maintenance advice. Please see specific information pulled into the AVS if appropriate.

## 2023-09-08 ENCOUNTER — HOSPITAL ENCOUNTER (OUTPATIENT)
Dept: BONE DENSITY | Facility: HOSPITAL | Age: 73
Discharge: HOME | End: 2023-09-08
Admitting: NURSE PRACTITIONER
Payer: MEDICARE

## 2023-09-08 DIAGNOSIS — Z78.0 POST-MENOPAUSAL: ICD-10-CM

## 2023-09-08 PROCEDURE — 77080 DXA BONE DENSITY AXIAL: CPT

## 2023-09-11 DIAGNOSIS — M81.0 OSTEOPOROSIS, UNSPECIFIED OSTEOPOROSIS TYPE, UNSPECIFIED PATHOLOGICAL FRACTURE PRESENCE: Primary | ICD-10-CM

## 2023-09-11 RX ORDER — ALENDRONATE SODIUM 70 MG/1
70 TABLET ORAL
Qty: 13 TABLET | Refills: 3 | Status: SHIPPED | OUTPATIENT
Start: 2023-09-11

## 2023-09-11 RX ORDER — ALENDRONATE SODIUM 70 MG/1
70 TABLET ORAL
COMMUNITY
End: 2023-09-11 | Stop reason: SDUPTHER

## 2023-09-11 NOTE — TELEPHONE ENCOUNTER
----- Message from MICHELLE Albrecht sent at 9/11/2023  9:54 AM EDT -----  CONCLUSION  Osteoporosis  Start Fosamax 70 mg once weekly #13 with 3 refills also recommend calcium plus vitamin D 1 tablet twice daily and weightbearing exercises walking 30 minutes daily

## 2023-09-12 DIAGNOSIS — M25.511 RIGHT SHOULDER PAIN, UNSPECIFIED CHRONICITY: ICD-10-CM

## 2023-09-12 RX ORDER — TRAMADOL HYDROCHLORIDE 50 MG/1
TABLET ORAL
Qty: 28 TABLET | Refills: 0 | OUTPATIENT
Start: 2023-09-12

## 2023-10-09 ENCOUNTER — OFFICE VISIT (OUTPATIENT)
Dept: ORTHOPEDIC SURGERY | Facility: CLINIC | Age: 73
End: 2023-10-09
Payer: MEDICARE

## 2023-10-09 VITALS
SYSTOLIC BLOOD PRESSURE: 139 MMHG | OXYGEN SATURATION: 96 % | HEART RATE: 74 BPM | WEIGHT: 116.6 LBS | DIASTOLIC BLOOD PRESSURE: 82 MMHG | BODY MASS INDEX: 21.46 KG/M2 | HEIGHT: 62 IN

## 2023-10-09 DIAGNOSIS — Z47.89 AFTERCARE FOLLOWING SURGERY OF THE MUSCULOSKELETAL SYSTEM: Primary | ICD-10-CM

## 2023-10-09 DIAGNOSIS — M25.511 RIGHT SHOULDER PAIN, UNSPECIFIED CHRONICITY: ICD-10-CM

## 2023-10-09 RX ORDER — DEXAMETHASONE SODIUM PHOSPHATE 4 MG/ML
4 INJECTION, SOLUTION INTRA-ARTICULAR; INTRALESIONAL; INTRAMUSCULAR; INTRAVENOUS; SOFT TISSUE ONCE
Status: COMPLETED | OUTPATIENT
Start: 2023-10-09 | End: 2023-10-09

## 2023-10-09 RX ADMIN — DEXAMETHASONE SODIUM PHOSPHATE 4 MG: 4 INJECTION, SOLUTION INTRA-ARTICULAR; INTRALESIONAL; INTRAMUSCULAR; INTRAVENOUS; SOFT TISSUE at 11:04

## 2023-10-09 NOTE — PROGRESS NOTES
"Chief Complaint  Follow-up and Pain of the Right Shoulder    Subjective      Samira Nithya Kramer presents to [facility] for follow-up of right proximal humerus ORIF with Dr. Arellano on 4/18/2023.  Patient is currently 5 months 21 days postop.  Reports that she is still having a \"catch\" in her arm with certain movements, particularly with flexion and abduction.  Reports that the pain occurs mid humerus.  She has pain with internal rotation behind her back as well.  She is still in physical therapy attending once per week.  Reports that she has gotten a new physical therapist.  She reports that she also feels that her scapula is rubbing on her ribs during some exercises.    Objective   Allergies   Allergen Reactions    Fenofibrate Hives    Aleve [Naproxen] GI Intolerance    Atorvastatin Myalgia    Nsaids GI Intolerance    Omega 3-6-9 Fatty Acids Diarrhea    Penicillins Hives    Zetia [Ezetimibe] Rash       Vital Signs:   /82   Pulse 74   Ht 157.5 cm (62\")   Wt 52.9 kg (116 lb 9.6 oz)   SpO2 96%   BMI 21.33 kg/mý       Physical Exam    Constitutional: Awake, alert. Well nourished appearance.    Integumentary: Warm, dry, intact. No obvious rashes.    HENT: Atraumatic, normocephalic.   Respiratory: Non labored respirations .   Cardiovascular: Intact peripheral pulses.    Psychiatric: Normal mood and affect. A&O X3    Ortho Exam  Right arm: Incision is completely healed well approximated.  Active range of motion forward shoulder flexion to 160 degrees, abduction 90 degrees, internal rotation to sacrum, external Tatian 45 degrees.  Full range of motion of the elbow and the wrist.  Right scapula does ride higher than the left due to abnormal curvature of the spine.  No dislocation noted.    Imaging Results (Most Recent)       None                      Assessment and Plan   Problem List Items Addressed This Visit    None  Visit Diagnoses       Aftercare following right proximal " humerus ORIF    -  Primary    Relevant Medications    dexAMETHasone (DECADRON) injection 4 mg (Completed)    Other Relevant Orders    Ambulatory Referral to Physical Therapy Evaluate and treat, POST OP; Stretching, ROM, Strengthening (Completed)    Right shoulder pain, unspecified chronicity        Relevant Medications    dexAMETHasone (DECADRON) injection 4 mg (Completed)            Follow Up   Return in about 6 weeks (around 11/20/2023).    Patient is a smoker, please discuss smoking cessation options with your PCP.    Social History     Socioeconomic History    Marital status:    Tobacco Use    Smoking status: Every Day     Packs/day: 1     Types: Cigarettes    Smokeless tobacco: Never    Tobacco comments:     INST PER ANESTHESIA PROTOCOL, smoked last yesterday   Vaping Use    Vaping Use: Never used   Substance and Sexual Activity    Alcohol use: Never    Drug use: Never    Sexual activity: Defer       Patient Instructions   Reviewed previous x-rays with patient.  Hardware was intact and fracture was near completely healed.    Discussed continuing physical therapy.    Continue home exercise program as well.  Discussed with patient that if she gets to a point where she is not feeling benefit from physical therapy she may discontinue.    IM steroid injection given in office today.    Follow-up in 6 weeks to evaluate pain.  May consider MRI at that time.  Call for questions, concerns or worsening symptoms.  Patient was given instructions and counseling regarding her condition or for health maintenance advice. Please see specific information pulled into the AVS if appropriate.

## 2023-10-09 NOTE — PATIENT INSTRUCTIONS
Reviewed previous x-rays with patient.  Hardware was intact and fracture was near completely healed.    Discussed continuing physical therapy.    Continue home exercise program as well.  Discussed with patient that if she gets to a point where she is not feeling benefit from physical therapy she may discontinue.    IM steroid injection given in office today.    Follow-up in 6 weeks to evaluate pain.  May consider MRI at that time.  Call for questions, concerns or worsening symptoms.

## 2023-10-13 DIAGNOSIS — E03.9 ACQUIRED HYPOTHYROIDISM: ICD-10-CM

## 2023-10-13 RX ORDER — LEVOTHYROXINE SODIUM 0.03 MG/1
25 TABLET ORAL EVERY MORNING
Qty: 90 TABLET | Refills: 0 | Status: SHIPPED | OUTPATIENT
Start: 2023-10-13

## 2023-10-17 RX ORDER — DICLOFENAC SODIUM 75 MG/1
75 TABLET, DELAYED RELEASE ORAL 2 TIMES DAILY
Qty: 60 TABLET | Refills: 0 | Status: SHIPPED | OUTPATIENT
Start: 2023-10-17

## 2023-11-01 ENCOUNTER — OFFICE VISIT (OUTPATIENT)
Dept: FAMILY MEDICINE CLINIC | Facility: CLINIC | Age: 73
End: 2023-11-01
Payer: MEDICARE

## 2023-11-01 VITALS
HEIGHT: 62 IN | WEIGHT: 109 LBS | TEMPERATURE: 97 F | OXYGEN SATURATION: 95 % | BODY MASS INDEX: 20.06 KG/M2 | SYSTOLIC BLOOD PRESSURE: 125 MMHG | DIASTOLIC BLOOD PRESSURE: 81 MMHG | HEART RATE: 76 BPM

## 2023-11-01 DIAGNOSIS — R05.1 ACUTE COUGH: ICD-10-CM

## 2023-11-01 DIAGNOSIS — J01.00 ACUTE NON-RECURRENT MAXILLARY SINUSITIS: ICD-10-CM

## 2023-11-01 DIAGNOSIS — J30.9 ALLERGIC RHINITIS, UNSPECIFIED SEASONALITY, UNSPECIFIED TRIGGER: Primary | ICD-10-CM

## 2023-11-01 DIAGNOSIS — H61.23 BILATERAL IMPACTED CERUMEN: ICD-10-CM

## 2023-11-01 RX ORDER — METHYLPREDNISOLONE ACETATE 80 MG/ML
60 INJECTION, SUSPENSION INTRA-ARTICULAR; INTRALESIONAL; INTRAMUSCULAR; SOFT TISSUE ONCE
Status: SHIPPED | OUTPATIENT
Start: 2023-11-01

## 2023-11-01 RX ORDER — FLUTICASONE PROPIONATE 50 MCG
2 SPRAY, SUSPENSION (ML) NASAL DAILY
Qty: 18.2 ML | Refills: 1 | Status: SHIPPED | OUTPATIENT
Start: 2023-11-01

## 2023-11-01 RX ORDER — BROMPHENIRAMINE MALEATE, PSEUDOEPHEDRINE HYDROCHLORIDE, AND DEXTROMETHORPHAN HYDROBROMIDE 2; 30; 10 MG/5ML; MG/5ML; MG/5ML
5 SYRUP ORAL 4 TIMES DAILY PRN
Qty: 473 ML | Refills: 0 | Status: SHIPPED | OUTPATIENT
Start: 2023-11-01

## 2023-11-01 RX ORDER — AZITHROMYCIN 250 MG/1
TABLET, FILM COATED ORAL
Qty: 6 TABLET | Refills: 0 | Status: SHIPPED | OUTPATIENT
Start: 2023-11-01

## 2023-11-01 NOTE — PROGRESS NOTES
ACUTE VISIT     Patient Name: Samira Kramer  : 1950   MRN: [id number]     Chief Complaint:    Chief Complaint   Patient presents with    Cough    congestion       History of Present Illness: Samira Kramer is a 72 y.o. female who is here today for cough and congestion.    She says symptoms started 2 weeks ago. She is coughing up yellow mucous. She denies fever, chills.  Has tried DayQuil NyQuil and Francheska-[place] cold relief      Subjective      Review of Systems:   Review of Systems   Constitutional:  Negative for chills and fever.   HENT:  Positive for congestion, ear pain, postnasal drip and sinus pressure. Negative for sore throat.    Eyes:  Negative for itching.   Respiratory:  Positive for cough.    Neurological:  Positive for headaches.        Past Medical History:   Past Medical History:   Diagnosis Date    Gastro-esophageal reflux disease without esophagitis     Humerus fracture     RIGHT PROXIMAL    Hyperlipidemia, unspecified     Hypothyroidism, unspecified     Kidney stone     NO CURRENT ISSUES    Macular degeneration     Smoker     Vitamin D deficiency, unspecified        Past Surgical History:   Past Surgical History:   Procedure Laterality Date    APPENDECTOMY      COLONOSCOPY  2017, 2018    per Dr. Hernandez, normal, due in     ORIF HUMERUS FRACTURE Right 2023    Procedure: HUMERUS PROXIMAL OPEN REDUCTION INTERNAL FIXATION;  Surgeon: Keshawn Arellano MD;  Location: [facility];  Service: Orthopedics;  Laterality: Right;    URETEROSCOPY  2018    SCOPE-BLADDER AND KIDNEYS       Family History:   Family History   Problem Relation Age of Onset    Cancer Mother         TYPE NOT SPECIFIED    Cancer Father         TYPE NOT SPECIFIED    Malig Hyperthermia Neg Hx        Social History:   Social History     Socioeconomic History    Marital status:    Tobacco Use    Smoking status: Every Day     Packs/day: 1     Types: Cigarettes    Smokeless tobacco:  Never    Tobacco comments:     INST PER ANESTHESIA PROTOCOL, smoked last yesterday   Vaping Use    Vaping Use: Never used   Substance and Sexual Activity    Alcohol use: Never    Drug use: Never    Sexual activity: Defer       Medications:     Current Outpatient Medications:     alendronate (Fosamax) 70 MG tablet, Take 1 tablet by mouth Every 7 (Seven) Days., Disp: 13 tablet, Rfl: 3    Ascorbic Acid (Vitamin C) 500 MG/5ML liquid, Daily. LAST DOSE 4/13/23, Disp: , Rfl:     Calcium Polycarbophil (FIBER-CAPS PO), Take  by mouth. LAST DOSE 4/13/23, Disp: , Rfl:     cholecalciferol (VITAMIN D3) 25 MCG (1000 UT) tablet, Take 1 tablet by mouth Daily., Disp: , Rfl:     coenzyme Q10 100 MG capsule, Take 1 capsule by mouth Daily. LAST DOSE 4/13/23, Disp: , Rfl:     colestipol (COLESTID) 1 g tablet, Take 1 tablet by mouth twice daily, Disp: 180 tablet, Rfl: 0    desonide (DESOWEN) 0.05 % cream, desonide 0.05 % topical cream apply sparingly and rub gently into the affected area(s) by topical route 2 times per day   Suspended, Disp: , Rfl:     diclofenac (VOLTAREN) 75 MG EC tablet, Take 1 tablet by mouth twice daily, Disp: 60 tablet, Rfl: 0    Diclofenac Sodium (VOLTAREN) 1 % gel gel, Apply 4 g topically to the appropriate area as directed 4 (Four) Times a Day As Needed (joint pain)., Disp: 350 g, Rfl: 1    Evolocumab (REPATHA) solution auto-injector SureClick injection, Inject 1 mL under the skin into the appropriate area as directed Every 14 (Fourteen) Days., Disp: 2 mL, Rfl: 5    HYDROcodone-acetaminophen (Norco) 7.5-325 MG per tablet, Take 1 tablet by mouth Every 6 (Six) Hours As Needed for Moderate Pain., Disp: 28 tablet, Rfl: 0    levothyroxine (SYNTHROID, LEVOTHROID) 25 MCG tablet, TAKE 1 TABLET BY MOUTH ONCE DAILY IN THE MORNING, Disp: 90 tablet, Rfl: 0    multivitamin with minerals tablet tablet, Take 2 tablets by mouth Daily. For eyes/macular degeneration LAST DOSE 4/13/23, Disp: , Rfl:     traMADol (ULTRAM) 50 MG  "tablet, Take 1 tablet by mouth Every 6 (Six) Hours As Needed for Moderate Pain., Disp: 28 tablet, Rfl: 0    traZODone (DESYREL) 50 MG tablet, Take 2 tablets by mouth Every Night., Disp: 180 tablet, Rfl: 1    vitamin B-12 (CYANOCOBALAMIN) 100 MCG tablet, LAST DOSE 4/13/23, Disp: , Rfl:     Zinc 10 MG lozenge, LAST DOSE 4/13/23, Disp: , Rfl:     azithromycin (Zithromax Z-Landon) 250 MG tablet, Take 2 tablets by mouth on day 1, then 1 tablet daily on days 2-5, Disp: 6 tablet, Rfl: 0    brompheniramine-pseudoephedrine-DM 30-2-10 MG/5ML syrup, Take 5 mL by mouth 4 (Four) Times a Day As Needed for Congestion, Cough or Allergies., Disp: 473 mL, Rfl: 0    carbamide peroxide (Debrox) 6.5 % otic solution, Administer 5 drops into both ears 2 (Two) Times a Day., Disp: 15 mL, Rfl: 2    fluticasone (FLONASE) 50 MCG/ACT nasal spray, 2 sprays into the nostril(s) as directed by provider Daily. 2 sprays each nostril daily, Disp: 18.2 mL, Rfl: 1    Current Facility-Administered Medications:     methylPREDNISolone acetate (DEPO-medrol) injection 60 mg, 60 mg, Intramuscular, Once, Jason Dunlap, APRN    Allergies:   Allergies   Allergen Reactions    Fenofibrate Hives    Aleve [Naproxen] GI Intolerance    Atorvastatin Myalgia    Nsaids GI Intolerance    Omega 3-6-9 Fatty Acids Diarrhea    Penicillins Hives    Zetia [Ezetimibe] Rash         Objective     Physical Exam:  Vital Signs:   Vitals:    11/01/23 1204   BP: 125/81   Pulse: 76   Temp: 97 °F (36.1 °C)   SpO2: 95%   Weight: 49.4 kg (109 lb)   Height: 157.5 cm (62\")     Body mass index is 19.94 kg/m².     Physical Exam  HENT:      Right Ear: There is impacted cerumen.      Left Ear: There is impacted cerumen.      Nose: Congestion and rhinorrhea present.      Right Turbinates: Enlarged and swollen.      Left Turbinates: Enlarged and swollen.      Right Sinus: Maxillary sinus tenderness present.      Left Sinus: Maxillary sinus tenderness present.      Mouth/Throat:      Mouth: " Mucous membranes are moist.      Comments: Copious PND  Eyes:      Conjunctiva/sclera:      Right eye: Right conjunctiva is injected.      Left eye: Left conjunctiva is injected.   Cardiovascular:      Rate and Rhythm: Normal rate and regular rhythm.      Heart sounds: Normal heart sounds. No murmur heard.  Pulmonary:      Effort: Pulmonary effort is normal.      Breath sounds: Normal breath sounds.   Lymphadenopathy:      Cervical: No cervical adenopathy.   Skin:     General: Skin is warm and dry.   Neurological:      Mental Status: She is alert.             Assessment / Plan      Assessment/Plan:   Diagnoses and all orders for this visit:    1. Allergic rhinitis, unspecified seasonality, unspecified trigger (Primary)  -     methylPREDNISolone acetate (DEPO-medrol) injection 60 mg    2. Acute non-recurrent maxillary sinusitis  -     methylPREDNISolone acetate (DEPO-medrol) injection 60 mg    3. Acute cough    4. Bilateral impacted cerumen    Other orders  -     carbamide peroxide (Debrox) 6.5 % otic solution; Administer 5 drops into both ears 2 (Two) Times a Day.  Dispense: 15 mL; Refill: 2  -     azithromycin (Zithromax Z-Landon) 250 MG tablet; Take 2 tablets by mouth on day 1, then 1 tablet daily on days 2-5  Dispense: 6 tablet; Refill: 0  -     fluticasone (FLONASE) 50 MCG/ACT nasal spray; 2 sprays into the nostril(s) as directed by provider Daily. 2 sprays each nostril daily  Dispense: 18.2 mL; Refill: 1  -     brompheniramine-pseudoephedrine-DM 30-2-10 MG/5ML syrup; Take 5 mL by mouth 4 (Four) Times a Day As Needed for Congestion, Cough or Allergies.  Dispense: 473 mL; Refill: 0       Allergic rhinitis uncontrolled we will provide Depo-Medrol 60 in office we will start Flonase  Acute sinusitis we will treat with azithromycin patient is allergic to penicillin  Acute cough we will provide Bromfed-DM  Bilateral impacted cerumen patient politely declined cerumen removal will provide Debrox      Follow Up:   Return if  symptoms worsen or fail to improve.    Meera Dunlap, MICHELLE      Please note that portions of this note were completed with a voice recognition program.

## 2023-11-20 ENCOUNTER — OFFICE VISIT (OUTPATIENT)
Dept: ORTHOPEDIC SURGERY | Facility: CLINIC | Age: 73
End: 2023-11-20
Payer: MEDICARE

## 2023-11-20 VITALS
BODY MASS INDEX: 20.06 KG/M2 | SYSTOLIC BLOOD PRESSURE: 135 MMHG | WEIGHT: 109 LBS | DIASTOLIC BLOOD PRESSURE: 80 MMHG | HEART RATE: 75 BPM | HEIGHT: 62 IN | OXYGEN SATURATION: 91 %

## 2023-11-20 DIAGNOSIS — Z47.89 AFTERCARE FOLLOWING SURGERY OF THE MUSCULOSKELETAL SYSTEM: Primary | ICD-10-CM

## 2023-11-20 PROCEDURE — 1160F RVW MEDS BY RX/DR IN RCRD: CPT

## 2023-11-20 PROCEDURE — 99214 OFFICE O/P EST MOD 30 MIN: CPT

## 2023-11-20 PROCEDURE — 1159F MED LIST DOCD IN RCRD: CPT

## 2023-11-20 PROCEDURE — 20610 DRAIN/INJ JOINT/BURSA W/O US: CPT

## 2023-11-20 RX ADMIN — LIDOCAINE HYDROCHLORIDE 5 ML: 10 INJECTION, SOLUTION INFILTRATION; PERINEURAL at 11:22

## 2023-11-20 RX ADMIN — TRIAMCINOLONE ACETONIDE 40 MG: 40 INJECTION, SUSPENSION INTRA-ARTICULAR; INTRAMUSCULAR at 11:22

## 2023-11-20 NOTE — PROGRESS NOTES
"Chief Complaint  Follow-up of the Right Shoulder    Subjective      Samira Nithya Kramer presents to [facility] for follow-up of right proximal humerus ORIF with Dr. Arellano on 4/18/2023.  Patient reports that she continues to have pain at the biceps tendon insertion site with shoulder flexion at 90 degrees.  Patient has continued physical therapy.    Objective   Allergies   Allergen Reactions    Fenofibrate Hives    Aleve [Naproxen] GI Intolerance    Atorvastatin Myalgia    Nsaids GI Intolerance    Omega 3-6-9 Fatty Acids Diarrhea    Penicillins Hives    Zetia [Ezetimibe] Rash       Vital Signs:   /80   Pulse 75   Ht 157.5 cm (62\")   Wt 49.4 kg (109 lb)   SpO2 91%   BMI 19.94 kg/m²       Physical Exam    Constitutional: Awake, alert. Well nourished appearance.    Integumentary: Warm, dry, intact. No obvious rashes.    HENT: Atraumatic, normocephalic.   Respiratory: Non labored respirations .   Cardiovascular: Intact peripheral pulses.    Psychiatric: Normal mood and affect. A&O X3    Ortho Exam  Right shoulder: Incision is completely healed and well-approximated.  Active range of motion forward shoulder flexion to 130 degrees, abduction 150 degrees, internal rotation to sacrum, external rotation 30 degrees.  Full range of motion of the elbow and the wrist.  Neurovascular intact.  Sensation is intact.  Tender to palpation of the biceps tendon insertion site.    Imaging Results (Most Recent)       Procedure Component Value Units Date/Time    XR Scapula Right [[id number]] Resulted: 11/21/23 1519     Updated: 11/21/23 1519    Narrative:      X-Ray Report:  Study: X-rays ordered, taken in the office, and reviewed today.   Site: Right scapula xray  Indication: Proximal humerus ORIF  View: AP/Lateral view(s)  Findings: Proximal humerus ORIF with stable alignment and complete healing   noted. No evidence of hardware malfunction.   Prior studies available for comparison: yes          "     Large Joint RIGHT SHOULDER  Date/Time: 11/20/2023 11:22 AM  Consent given by: patient  Site marked: site marked  Timeout: Immediately prior to procedure a time out was called to verify the correct patient, procedure, equipment, support staff and site/side marked as required   Supporting Documentation  Indications: pain   Procedure Details  Location: shoulder -   Preparation: Patient was prepped and draped in the usual sterile fashion  Needle gauge: 21G.  Medications administered: 5 mL lidocaine 1 %; 40 mg triamcinolone acetonide 40 MG/ML  Patient tolerance: patient tolerated the procedure well with no immediate complications              Assessment and Plan   Problem List Items Addressed This Visit    None  Visit Diagnoses       Aftercare following Right proximal humerus ORIF    -  Primary    Relevant Orders    Ambulatory Referral to Physical Therapy Evaluate and treat, POST OP; Stretching, ROM, Strengthening (Completed)    XR Scapula Right (Completed)    Large Joint RIGHT SHOULDER            Follow Up   Return in about 6 weeks (around 1/1/2024).    Patient is a smoker, please discuss smoking cessation options with your PCP.    Social History     Socioeconomic History    Marital status:    Tobacco Use    Smoking status: Every Day     Packs/day: 1     Types: Cigarettes    Smokeless tobacco: Never    Tobacco comments:     INST PER ANESTHESIA PROTOCOL, smoked last yesterday   Vaping Use    Vaping Use: Never used   Substance and Sexual Activity    Alcohol use: Never    Drug use: Never    Sexual activity: Defer       Patient Instructions   X-rays taken and reviewed with patient.  Fracture is well-healed and hardware is intact.    Discussed a steroid injection into the shoulder with the patient.  Patient wishes to proceed with this.  She tolerated procedure well.  Educated on 3 months duration between injections.    We discussed continuing physical therapy and she would like to continue.  Order was placed today  in office.    Follow-up in 6 weeks to evaluate pain and progress with physical therapy.  Call for questions, concerns or worsening symptoms.  Patient was given instructions and counseling regarding her condition or for health maintenance advice. Please see specific information pulled into the AVS if appropriate.

## 2023-11-27 RX ORDER — TRIAMCINOLONE ACETONIDE 40 MG/ML
40 INJECTION, SUSPENSION INTRA-ARTICULAR; INTRAMUSCULAR
Status: COMPLETED | OUTPATIENT
Start: 2023-11-20 | End: 2023-11-20

## 2023-11-27 RX ORDER — LIDOCAINE HYDROCHLORIDE 10 MG/ML
5 INJECTION, SOLUTION INFILTRATION; PERINEURAL
Status: COMPLETED | OUTPATIENT
Start: 2023-11-20 | End: 2023-11-20

## 2023-11-27 NOTE — PATIENT INSTRUCTIONS
X-rays taken and reviewed with patient.  Fracture is well-healed and hardware is intact.    Discussed a steroid injection into the shoulder with the patient.  Patient wishes to proceed with this.  She tolerated procedure well.  Educated on 3 months duration between injections.    We discussed continuing physical therapy and she would like to continue.  Order was placed today in office.    Follow-up in 6 weeks to evaluate pain and progress with physical therapy.  Call for questions, concerns or worsening symptoms.

## 2023-12-21 ENCOUNTER — LAB (OUTPATIENT)
Dept: LAB | Facility: HOSPITAL | Age: 73
End: 2023-12-21
Payer: MEDICARE

## 2023-12-21 ENCOUNTER — HOSPITAL ENCOUNTER (OUTPATIENT)
Dept: CT IMAGING | Facility: HOSPITAL | Age: 73
Discharge: HOME | End: 2023-12-21
Payer: MEDICARE

## 2023-12-21 DIAGNOSIS — E78.2 MIXED HYPERLIPIDEMIA: ICD-10-CM

## 2023-12-21 DIAGNOSIS — Z72.0 TOBACCO ABUSE: ICD-10-CM

## 2023-12-21 DIAGNOSIS — Z87.891 HISTORY OF SMOKING 25-50 PACK YEARS: ICD-10-CM

## 2023-12-21 LAB
ALBUMIN SERPL-MCNC: 4.3 G/DL (ref 3.5–5.2)
ALBUMIN/GLOB SERPL: 1.5 G/DL
ALP SERPL-CCNC: 105 U/L (ref 39–117)
ALT SERPL W P-5'-P-CCNC: 21 U/L (ref 1–33)
ANION GAP SERPL CALCULATED.3IONS-SCNC: 10 MMOL/L (ref 5–15)
AST SERPL-CCNC: 25 U/L (ref 1–32)
BILIRUB SERPL-MCNC: 0.3 MG/DL (ref 0–1.2)
BUN SERPL-MCNC: 12 MG/DL (ref 8–23)
BUN/CREAT SERPL: 12 (ref 7–25)
CALCIUM SPEC-SCNC: 9.7 MG/DL (ref 8.6–10.5)
CHLORIDE SERPL-SCNC: 104 MMOL/L (ref 98–107)
CHOLEST SERPL-MCNC: 279 MG/DL (ref 0–200)
CO2 SERPL-SCNC: 26 MMOL/L (ref 22–29)
CREAT SERPL-MCNC: 1 MG/DL (ref 0.57–1)
EGFRCR SERPLBLD CKD-EPI 2021: 59.2 ML/MIN/1.73
GLOBULIN UR ELPH-MCNC: 2.8 GM/DL
GLUCOSE SERPL-MCNC: 94 MG/DL (ref 65–99)
HDLC SERPL-MCNC: 41 MG/DL (ref 40–60)
LDLC SERPL CALC-MCNC: 199 MG/DL (ref 0–100)
LDLC/HDLC SERPL: 4.82 {RATIO}
POTASSIUM SERPL-SCNC: 4.1 MMOL/L (ref 3.5–5.2)
PROT SERPL-MCNC: 7.1 G/DL (ref 6–8.5)
SODIUM SERPL-SCNC: 140 MMOL/L (ref 136–145)
TRIGL SERPL-MCNC: 201 MG/DL (ref 0–150)
VLDLC SERPL-MCNC: 39 MG/DL (ref 5–40)

## 2023-12-21 PROCEDURE — 71271 CT THORAX LUNG CANCER SCR C-: CPT

## 2023-12-21 PROCEDURE — 80053 COMPREHEN METABOLIC PANEL: CPT

## 2023-12-21 PROCEDURE — 80061 LIPID PANEL: CPT

## 2023-12-27 ENCOUNTER — OFFICE VISIT (OUTPATIENT)
Dept: ORTHOPEDIC SURGERY | Facility: CLINIC | Age: 73
End: 2023-12-27
Payer: MEDICARE

## 2023-12-27 VITALS
SYSTOLIC BLOOD PRESSURE: 121 MMHG | DIASTOLIC BLOOD PRESSURE: 72 MMHG | OXYGEN SATURATION: 95 % | HEART RATE: 69 BPM | HEIGHT: 62 IN | BODY MASS INDEX: 20.04 KG/M2 | WEIGHT: 108.91 LBS

## 2023-12-27 DIAGNOSIS — Z47.89 AFTERCARE FOLLOWING SURGERY OF THE MUSCULOSKELETAL SYSTEM: Primary | ICD-10-CM

## 2023-12-28 ENCOUNTER — OFFICE VISIT (OUTPATIENT)
Dept: FAMILY MEDICINE CLINIC | Facility: CLINIC | Age: 73
End: 2023-12-28
Payer: MEDICARE

## 2023-12-28 ENCOUNTER — REFERRAL TRIAGE (OUTPATIENT)
Dept: CASE MANAGEMENT | Facility: OTHER | Age: 73
End: 2023-12-28
Payer: MEDICARE

## 2023-12-28 ENCOUNTER — PATIENT OUTREACH (OUTPATIENT)
Dept: CASE MANAGEMENT | Facility: OTHER | Age: 73
End: 2023-12-28
Payer: MEDICARE

## 2023-12-28 VITALS
HEIGHT: 62 IN | TEMPERATURE: 97 F | OXYGEN SATURATION: 100 % | DIASTOLIC BLOOD PRESSURE: 79 MMHG | BODY MASS INDEX: 20.98 KG/M2 | SYSTOLIC BLOOD PRESSURE: 132 MMHG | HEART RATE: 71 BPM | WEIGHT: 114 LBS

## 2023-12-28 DIAGNOSIS — M81.0 OSTEOPOROSIS, UNSPECIFIED OSTEOPOROSIS TYPE, UNSPECIFIED PATHOLOGICAL FRACTURE PRESENCE: ICD-10-CM

## 2023-12-28 DIAGNOSIS — Z72.0 TOBACCO ABUSE: ICD-10-CM

## 2023-12-28 DIAGNOSIS — R31.9 HEMATURIA, UNSPECIFIED TYPE: ICD-10-CM

## 2023-12-28 DIAGNOSIS — R30.0 DYSURIA: ICD-10-CM

## 2023-12-28 DIAGNOSIS — Z12.31 SCREENING MAMMOGRAM FOR BREAST CANCER: ICD-10-CM

## 2023-12-28 DIAGNOSIS — Z87.891 HISTORY OF SMOKING 25-50 PACK YEARS: ICD-10-CM

## 2023-12-28 DIAGNOSIS — F51.01 PRIMARY INSOMNIA: ICD-10-CM

## 2023-12-28 DIAGNOSIS — E55.9 VITAMIN D DEFICIENCY: ICD-10-CM

## 2023-12-28 DIAGNOSIS — E78.2 MIXED HYPERLIPIDEMIA: ICD-10-CM

## 2023-12-28 DIAGNOSIS — Z78.9 STATIN INTOLERANCE: ICD-10-CM

## 2023-12-28 DIAGNOSIS — R91.1 SOLID NODULE OF LUNG GREATER THAN 8 MM IN DIAMETER: Primary | ICD-10-CM

## 2023-12-28 DIAGNOSIS — E03.9 ACQUIRED HYPOTHYROIDISM: ICD-10-CM

## 2023-12-28 LAB
BILIRUB BLD-MCNC: NEGATIVE MG/DL
CLARITY, POC: CLEAR
COLOR UR: YELLOW
EXPIRATION DATE: ABNORMAL
GLUCOSE UR STRIP-MCNC: NEGATIVE MG/DL
KETONES UR QL: NEGATIVE
LEUKOCYTE EST, POC: ABNORMAL
Lab: ABNORMAL
NITRITE UR-MCNC: NEGATIVE MG/ML
PH UR: 6 [PH] (ref 5–8)
PROT UR STRIP-MCNC: ABNORMAL MG/DL
RBC # UR STRIP: ABNORMAL /UL
SP GR UR: 1.01 (ref 1–1.03)
UROBILINOGEN UR QL: ABNORMAL

## 2023-12-28 PROCEDURE — 87086 URINE CULTURE/COLONY COUNT: CPT | Performed by: NURSE PRACTITIONER

## 2023-12-28 RX ORDER — LEVOTHYROXINE SODIUM 0.03 MG/1
25 TABLET ORAL EVERY MORNING
Qty: 90 TABLET | Refills: 1 | Status: SHIPPED | OUTPATIENT
Start: 2023-12-28

## 2023-12-28 RX ORDER — TRAZODONE HYDROCHLORIDE 50 MG/1
100 TABLET ORAL NIGHTLY
Qty: 180 TABLET | Refills: 1 | Status: SHIPPED | OUTPATIENT
Start: 2023-12-28

## 2023-12-28 NOTE — PROGRESS NOTES
Follow Up Office Visit      Patient Name: Samira Kramer  : 1950   MRN: [id number]     Chief Complaint:    Chief Complaint   Patient presents with    Heartburn    Hyperlipidemia    Hypothyroidism    Insomnia    Hypertension       History of Present Illness: Samira Kramer is a 73 y.o. female who is here today to follow up for hyperlipidemia, hypothyroidism, HTN, insomnia, GERD  Review ct chest results  Review lab results    Follow up new start repatha,  patient is allergic to statins and Zetia will send to pharmacy   Pt reports she did not pick this up from the pharmacy, states she looked online and saw that the copay may be up to $300-400 each month    Mammogram- order placed  Dexa- 2023    Smoking history 1 PPD for 50 years   Pt declines smoking cessation tried chantix in past caused nightmares  Reports allergic reaction to the patches causing swelling and redness     Also c.o blood in urine for a few days    Patient received a ear lavage in one ear. She did not want to have both ears cleaned out.      Subjective      Review of Systems:   Review of Systems   Constitutional:  Negative for fever.   HENT:  Negative for ear pain and sore throat.    Respiratory:  Negative for cough.    Cardiovascular:  Negative for chest pain.   Gastrointestinal:  Negative for abdominal pain, diarrhea, nausea and vomiting.   Genitourinary:  Negative for dysuria.   Psychiatric/Behavioral:  Negative for sleep disturbance.         Past Medical History:   Past Medical History:   Diagnosis Date    Gastro-esophageal reflux disease without esophagitis     Humerus fracture     RIGHT PROXIMAL    Hyperlipidemia, unspecified     Hypothyroidism, unspecified     Kidney stone     NO CURRENT ISSUES    Macular degeneration     Smoker     Vitamin D deficiency, unspecified        Past Surgical History:   Past Surgical History:   Procedure Laterality Date    APPENDECTOMY      COLONOSCOPY  2017, 2018    per   Hernandez, normal, due in 2027    ORIF HUMERUS FRACTURE Right 4/18/2023    Procedure: HUMERUS PROXIMAL OPEN REDUCTION INTERNAL FIXATION;  Surgeon: Keshawn Arellano MD;  Location: [facility] MAIN OR;  Service: Orthopedics;  Laterality: Right;    URETEROSCOPY  02/2018    SCOPE-BLADDER AND KIDNEYS       Family History:   Family History   Problem Relation Age of Onset    Cancer Mother         TYPE NOT SPECIFIED    Cancer Father         TYPE NOT SPECIFIED    Malig Hyperthermia Neg Hx        Social History:   Social History     Socioeconomic History    Marital status:    Tobacco Use    Smoking status: Every Day     Packs/day: 1     Types: Cigarettes     Passive exposure: Never    Smokeless tobacco: Never    Tobacco comments:     INST PER ANESTHESIA PROTOCOL, smoked last yesterday   Vaping Use    Vaping Use: Never used   Substance and Sexual Activity    Alcohol use: Never    Drug use: Never    Sexual activity: Defer       Medications:     Current Outpatient Medications:     alendronate (Fosamax) 70 MG tablet, Take 1 tablet by mouth Every 7 (Seven) Days., Disp: 13 tablet, Rfl: 3    Ascorbic Acid (Vitamin C) 500 MG/5ML liquid, Daily. LAST DOSE 4/13/23, Disp: , Rfl:     Calcium Polycarbophil (FIBER-CAPS PO), Take  by mouth. LAST DOSE 4/13/23, Disp: , Rfl:     carbamide peroxide (Debrox) 6.5 % otic solution, Administer 5 drops into both ears 2 (Two) Times a Day., Disp: 15 mL, Rfl: 2    cholecalciferol (VITAMIN D3) 25 MCG (1000 UT) tablet, Take 1 tablet by mouth Daily., Disp: , Rfl:     desonide (DESOWEN) 0.05 % cream, desonide 0.05 % topical cream apply sparingly and rub gently into the affected area(s) by topical route 2 times per day   Suspended, Disp: , Rfl:     diclofenac (VOLTAREN) 75 MG EC tablet, Take 1 tablet by mouth twice daily, Disp: 60 tablet, Rfl: 0    Diclofenac Sodium (VOLTAREN) 1 % gel gel, Apply 4 g topically to the appropriate area as directed 4 (Four) Times a Day As Needed (joint pain)., Disp: 350 g, Rfl:  "1    Evolocumab (REPATHA) solution auto-injector SureClick injection, Inject 1 mL under the skin into the appropriate area as directed Every 14 (Fourteen) Days., Disp: 2 mL, Rfl: 5    fluticasone (FLONASE) 50 MCG/ACT nasal spray, 2 sprays into the nostril(s) as directed by provider Daily. 2 sprays each nostril daily, Disp: 18.2 mL, Rfl: 1    levothyroxine (SYNTHROID, LEVOTHROID) 25 MCG tablet, Take 1 tablet by mouth Every Morning., Disp: 90 tablet, Rfl: 1    multivitamin with minerals tablet tablet, Take 2 tablets by mouth Daily. For eyes/macular degeneration LAST DOSE 4/13/23, Disp: , Rfl:     traZODone (DESYREL) 50 MG tablet, Take 2 tablets by mouth Every Night., Disp: 180 tablet, Rfl: 1    vitamin B-12 (CYANOCOBALAMIN) 100 MCG tablet, LAST DOSE 4/13/23, Disp: , Rfl:     Zinc 10 MG lozenge, LAST DOSE 4/13/23, Disp: , Rfl:     coenzyme Q10 100 MG capsule, Take 1 capsule by mouth Daily. LAST DOSE 4/13/23 (Patient not taking: Reported on 12/28/2023), Disp: , Rfl:     HYDROcodone-acetaminophen (Norco) 7.5-325 MG per tablet, Take 1 tablet by mouth Every 6 (Six) Hours As Needed for Moderate Pain. (Patient not taking: Reported on 12/28/2023), Disp: 28 tablet, Rfl: 0    Current Facility-Administered Medications:     methylPREDNISolone acetate (DEPO-medrol) injection 60 mg, 60 mg, Intramuscular, Once, Colasanti, Chrysalis Nithya, APRN    Allergies:   Allergies   Allergen Reactions    Fenofibrate Hives    Aleve [Naproxen] GI Intolerance    Atorvastatin Myalgia    Nsaids GI Intolerance    Omega 3-6-9 Fatty Acids Diarrhea    Penicillins Hives    Zetia [Ezetimibe] Rash           Objective     Physical Exam:  Vital Signs:   Vitals:    12/28/23 1151   BP: 132/79   Pulse: 71   Temp: 97 °F (36.1 °C)   SpO2: 100%   Weight: 51.7 kg (114 lb)   Height: 157.5 cm (62\")     Body mass index is 20.85 kg/m².   BMI is within normal parameters. No other follow-up for BMI required.       Physical Exam  HENT:      Right Ear: Tympanic membrane " normal.      Left Ear: Tympanic membrane normal.      Nose: Nose normal.      Mouth/Throat:      Mouth: Mucous membranes are moist.   Eyes:      Conjunctiva/sclera: Conjunctivae normal.   Neck:      Vascular: No carotid bruit.   Cardiovascular:      Rate and Rhythm: Normal rate and regular rhythm.      Heart sounds: Normal heart sounds. No murmur heard.  Pulmonary:      Effort: Pulmonary effort is normal.      Breath sounds: Normal breath sounds.   Abdominal:      General: Bowel sounds are normal.      Palpations: Abdomen is soft.   Musculoskeletal:      Right lower leg: No edema.      Left lower leg: No edema.   Skin:     General: Skin is warm and dry.   Neurological:      Mental Status: She is alert.   Psychiatric:         Mood and Affect: Mood normal.         Behavior: Behavior normal.             Assessment / Plan      Assessment/Plan:   Diagnoses and all orders for this visit:    1. Solid nodule of lung greater than 8 mm in diameter (Primary)  -     NM PET/CT Whole Body; Future  -     CT Chest With Contrast; Future  -     Ambulatory Referral to Chronic Care Management    2. Mixed hyperlipidemia  -     Comprehensive Metabolic Panel; Future  -     Lipid Panel; Future  -     Ambulatory Referral to Chronic Care Management    3. Statin intolerance  -     Ambulatory Referral to Chronic Care Management    4. Acquired hypothyroidism  -     TSH; Future  -     T4, Free; Future  -     levothyroxine (SYNTHROID, LEVOTHROID) 25 MCG tablet; Take 1 tablet by mouth Every Morning.  Dispense: 90 tablet; Refill: 1  -     Ambulatory Referral to Chronic Care Management    5. Vitamin D deficiency  -     Ambulatory Referral to Chronic Care Management    6. Primary insomnia  -     Ambulatory Referral to Chronic Care Management    7. History of smoking 25-50 pack years  -     Ambulatory Referral to Chronic Care Management    8. Tobacco abuse  -     CBC Auto Differential; Future  -     Ambulatory Referral to Chronic Care Management    9.  "Osteoporosis, unspecified osteoporosis type, unspecified pathological fracture presence    10. Hematuria, unspecified type  -     POC Urinalysis Dipstick, Automated  -     Urine Culture - Urine, Urine, Clean Catch; Future  -     Urine Culture - Urine, Urine, Clean Catch    11. Dysuria    12. Screening mammogram for breast cancer  -     Mammo Screening Digital Tomosynthesis Bilateral With CAD; Future    Other orders  -     traZODone (DESYREL) 50 MG tablet; Take 2 tablets by mouth Every Night.  Dispense: 180 tablet; Refill: 1         Solid pulmonary nodule will obtain PET/CT as recommended by radiology repeat CT of the chest in 3 months  Hyperlipidemia LDL above goal recommend starting Repatha and will refer to chronic care management to help aid in patient assistance to obtain this medication as patient is statin intolerant and has an allergy to Zetia as well  Hypothyroidism we will obtain labs to monitor current Synthroid dose 25 mcg daily denies palpitations  Vitamin D deficiency recommend 2000 units supplementation daily  Insomnia currently stable on trazodone denies morning sedation recommend good sleep hygiene will provide a refill  History of smoking 50 pack years CT low-dose of the chest up-to-date we will call with PET scan results  Osteoporosis recommend calcium with vitamin D 1 tab twice daily with weightbearing exercise such as walking 30 minutes daily and Fosamax once weekly  Hematuria UA in office positive will send for culture patient denies dysuria will call with results and further recommendations  Will provide order for screening mammogram       Follow Up:   Return in about 1 month (around 1/28/2024).    Meera Dunlap, MICHELLE    \"Please note that portions of this note were completed with a voice recognition program.\"    "

## 2023-12-28 NOTE — OUTREACH NOTE
Patient Outreach    Face to face with PCP and Ms Kramer. She is unable to take oral statins due to myalgia. PCP has prescribed Repatha but pt reports she has looked this med up on line and it is very expensive. I have asked that she check with her pharmacy on what her cost would be. She will then call me back. I have informed her on [employer] patient assistance for Repatha. Instructed she will need to provide proof of income to be sent in with this form. She will contact me next week after checking with her pharmacy.     Patient information on preventing high cholesterol given to Ms Kramer.     Name and Relationship of Patient/Support Person: Samira Kramer - Self      Education Documentation  No documentation found.        Pat PUENTES  Ambulatory Case Management    12/28/2023, 15:08 EST

## 2023-12-29 PROBLEM — R31.9 HEMATURIA: Status: ACTIVE | Noted: 2023-12-29

## 2023-12-30 LAB — BACTERIA SPEC AEROBE CULT: NORMAL

## 2024-01-03 ENCOUNTER — PATIENT OUTREACH (OUTPATIENT)
Dept: CASE MANAGEMENT | Facility: OTHER | Age: 74
End: 2024-01-03
Payer: MEDICARE

## 2024-01-03 NOTE — OUTREACH NOTE
Patient Outreach    Face to face with Samira. She brought in proof in income and signed Repatha patient assistance forms. PCP will return from vacation on 1/9/24 and will complete her portion of forms. I will then fax to [phone].    Name and Relationship of Patient/Support Person: Samira Kramer - Self        Education Documentation  No documentation found.        Pat PUENTES  Ambulatory Case Management    1/3/2024, 14:20 EST

## 2024-01-04 NOTE — PROGRESS NOTES
"Chief Complaint  Follow-up of the Right Shoulder    Subjective      Samira Nithya Kramer presents to [facility] for follow-up of right proximal humerus ORIF with Dr. Arellano on 4/18/2023.  Patient reports a 20% improvement in her pain and motion since her last visit.  Reports that she quit physical therapy 2 weeks ago because she feels comfortable doing the exercises independently at home.  She last received a steroid injection of the shoulder on 11/20/2023.  Overall she is doing well but continuing to have some pain with certain activities.    Objective   Allergies   Allergen Reactions    Fenofibrate Hives    Aleve [Naproxen] GI Intolerance    Atorvastatin Myalgia    Nsaids GI Intolerance    Omega 3-6-9 Fatty Acids Diarrhea    Penicillins Hives    Zetia [Ezetimibe] Rash       Vital Signs:   /72   Pulse 69   Ht 157.5 cm (62\")   Wt 49.4 kg (108 lb 14.5 oz)   SpO2 95%   BMI 19.92 kg/m²       Physical Exam    Constitutional: Awake, alert. Well nourished appearance.    Integumentary: Warm, dry, intact. No obvious rashes.    HENT: Atraumatic, normocephalic.   Respiratory: Non labored respirations .   Cardiovascular: Intact peripheral pulses.    Psychiatric: Normal mood and affect. A&O X3    Ortho Exam  Right arm: Incision is completely healed.  Active range of motion forward shoulder flexion 170 degrees, abduction 170 degrees, internal rotation L1, external rotation 45 degrees.  Full range of motion of the elbow and the wrist.  Neurovascular intact.  Sensation is intact.  Tender to palpation of the biceps tendon insertion site.    Imaging Results (Most Recent)       None                      Assessment and Plan   Problem List Items Addressed This Visit    None  Visit Diagnoses       Aftercare following right proximal humerus ORIF    -  Primary            Follow Up   Return if symptoms worsen or fail to improve.    Patient is a smoker, please discuss smoking cessation options with " your PCP.    Social History     Socioeconomic History    Marital status:    Tobacco Use    Smoking status: Every Day     Packs/day: 1     Types: Cigarettes     Passive exposure: Never    Smokeless tobacco: Never    Tobacco comments:     INST PER ANESTHESIA PROTOCOL, smoked last yesterday   Vaping Use    Vaping Use: Never used   Substance and Sexual Activity    Alcohol use: Never    Drug use: Never    Sexual activity: Defer       Patient Instructions   Discussed treatment plan of care with patient.    Fracture is well-healed and hardware is stable on last x-ray.  Patient to continue home exercises.  Discussed that she may resume physical therapy at any time, just call for the order.  We discussed that she could have a repeat steroid injection in 6 weeks if she desires.  She declines at this time.    Patient elects to follow-up as needed for worsening symptoms.  Call for questions or concerns.  Patient was given instructions and counseling regarding her condition or for health maintenance advice. Please see specific information pulled into the AVS if appropriate.

## 2024-01-04 NOTE — PATIENT INSTRUCTIONS
Discussed treatment plan of care with patient.    Fracture is well-healed and hardware is stable on last x-ray.  Patient to continue home exercises.  Discussed that she may resume physical therapy at any time, just call for the order.  We discussed that she could have a repeat steroid injection in 6 weeks if she desires.  She declines at this time.    Patient elects to follow-up as needed for worsening symptoms.  Call for questions or concerns.

## 2024-01-09 ENCOUNTER — PATIENT OUTREACH (OUTPATIENT)
Dept: CASE MANAGEMENT | Facility: OTHER | Age: 74
End: 2024-01-09
Payer: MEDICARE

## 2024-01-09 DIAGNOSIS — Z78.9 STATIN INTOLERANCE: ICD-10-CM

## 2024-01-09 DIAGNOSIS — E78.2 MIXED HYPERLIPIDEMIA: Primary | ICD-10-CM

## 2024-01-09 NOTE — OUTREACH NOTE
Care Coordination    [facility] [place] paperwork completed and signed by PCP. Faxed to [phone]    Name and Relationship of Patient/Support Person: [facility] [place] -         Education Documentation  No documentation found.        Pat PUENTES  Ambulatory Case Management    1/9/2024, 12:58 EST

## 2024-01-10 PROBLEM — R30.0 DYSURIA: Status: ACTIVE | Noted: 2024-01-10

## 2024-01-11 ENCOUNTER — PATIENT OUTREACH (OUTPATIENT)
Dept: CASE MANAGEMENT | Facility: OTHER | Age: 74
End: 2024-01-11
Payer: MEDICARE

## 2024-01-11 DIAGNOSIS — E78.2 MIXED HYPERLIPIDEMIA: Primary | ICD-10-CM

## 2024-01-11 DIAGNOSIS — Z78.9 STATIN INTOLERANCE: ICD-10-CM

## 2024-01-11 NOTE — OUTREACH NOTE
Patient Outreach    Ms Kramer called to verify appointments that was made for her:  1/22/24 PET at 2 pm at [facility] and 4/1/24 f/u chest CT at [facility] at 1 pm.       Care Coordination    I called [employer] pt assistance regarding Repatha application. They report showing Ms Kramer has a Part D insurance. I have nothing on file. I discussed this with Ms Kramer during our above call. She reports she does have a Part D insurance and will bring card to office. I will then fill out new application and resend.       Name and Relationship of Patient/Support Person: WilliamsSamira - Self        Education Documentation  No documentation found.        Pat PUENTES  Ambulatory Case Management    1/11/2024, 14:26 EST

## 2024-01-15 ENCOUNTER — PATIENT OUTREACH (OUTPATIENT)
Dept: CASE MANAGEMENT | Facility: OTHER | Age: 74
End: 2024-01-15
Payer: MEDICARE

## 2024-01-15 DIAGNOSIS — Z78.9 STATIN INTOLERANCE: ICD-10-CM

## 2024-01-15 DIAGNOSIS — E78.2 MIXED HYPERLIPIDEMIA: Primary | ICD-10-CM

## 2024-01-15 NOTE — OUTREACH NOTE
Patient Outreach    Called and reminded Samira to bring Part D card by office to scan in chart. Needed for Repatha pt assistance paperwork.     Reminded of upcoming PET on 1/22/24 at 2 pm. I verified it was at [address]. Samira is aware.     She has PCP follow up 1/26/24 at 215 pm.     Name and Relationship of Patient/Support Person:  -         Education Documentation  No documentation found.        Pat PUENTES  Ambulatory Case Management    1/15/2024, 13:48 EST

## 2024-01-17 ENCOUNTER — PATIENT OUTREACH (OUTPATIENT)
Dept: CASE MANAGEMENT | Facility: OTHER | Age: 74
End: 2024-01-17
Payer: MEDICARE

## 2024-01-17 DIAGNOSIS — E78.2 MIXED HYPERLIPIDEMIA: Primary | ICD-10-CM

## 2024-01-17 NOTE — OUTREACH NOTE
Care Coordination    Ms Kramer brought in Part D insurance card. [facility] paperwork completed for Repatha and faxed to [phone]    Name and Relationship of Patient/Support Person: [employer] -         Education Documentation  No documentation found.        Pat PUENTES  Ambulatory Case Management    1/17/2024, 13:55 EST

## 2024-01-19 ENCOUNTER — TELEPHONE (OUTPATIENT)
Dept: FAMILY MEDICINE CLINIC | Facility: CLINIC | Age: 74
End: 2024-01-19
Payer: MEDICARE

## 2024-01-19 DIAGNOSIS — R91.1 INCIDENTAL LUNG NODULE, GREATER THAN OR EQUAL TO 8MM: Primary | ICD-10-CM

## 2024-01-19 NOTE — TELEPHONE ENCOUNTER
Amy with PET scan department called and said that medicare guidelines is recommending that the order be changed from whole body scan to skull base to mid-thigh scan because it is for a lung nodule. Please advise.

## 2024-01-22 ENCOUNTER — PATIENT OUTREACH (OUTPATIENT)
Dept: CASE MANAGEMENT | Facility: OTHER | Age: 74
End: 2024-01-22
Payer: MEDICARE

## 2024-01-22 ENCOUNTER — HOSPITAL ENCOUNTER (OUTPATIENT)
Dept: PET IMAGING | Facility: HOSPITAL | Age: 74
Discharge: HOME | End: 2024-01-22
Payer: MEDICARE

## 2024-01-22 DIAGNOSIS — E78.2 MIXED HYPERLIPIDEMIA: Primary | ICD-10-CM

## 2024-01-22 DIAGNOSIS — R91.1 INCIDENTAL LUNG NODULE, GREATER THAN OR EQUAL TO 8MM: ICD-10-CM

## 2024-01-22 DIAGNOSIS — Z78.9 STATIN INTOLERANCE: ICD-10-CM

## 2024-01-22 PROCEDURE — 78815 PET IMAGE W/CT SKULL-THIGH: CPT

## 2024-01-22 PROCEDURE — A9552 F18 FDG: HCPCS | Performed by: NURSE PRACTITIONER

## 2024-01-22 PROCEDURE — 0 FLUDEOXYGLUCOSE F18 SOLUTION: Performed by: NURSE PRACTITIONER

## 2024-01-22 RX ADMIN — FLUDEOXYGLUCOSE F 18 1 DOSE: 200 INJECTION, SOLUTION INTRAVENOUS at 14:53

## 2024-01-22 NOTE — OUTREACH NOTE
Care Coordination     Patient assistance for Repatha was denied. I called Repatha Ready program to see if there were any options for financial support. I was on hold for 12 min. I will try to contact at later time.     Name and Relationship of Patient/Support Person:  -         Education Documentation  No documentation found.        Pat PUENTES  Ambulatory Case Management    1/22/2024, 13:31 EST

## 2024-01-25 ENCOUNTER — TELEPHONE (OUTPATIENT)
Dept: CASE MANAGEMENT | Facility: OTHER | Age: 74
End: 2024-01-25
Payer: MEDICARE

## 2024-01-25 ENCOUNTER — PATIENT OUTREACH (OUTPATIENT)
Dept: CASE MANAGEMENT | Facility: OTHER | Age: 74
End: 2024-01-25
Payer: MEDICARE

## 2024-01-25 NOTE — OUTREACH NOTE
Care Coordination    [facility] [place] has declined application for pt assistance with Repatha. States insurance plan provides coverage for the medication requested. I called [employer] and spoke to representative Jd. He reports Ms Kramer would be responsible for $538.09 per month. I will send update to PCP. I have called Ms Kramer and made her aware. PCP appt 1/26/24 and plans on keeping that appt. Discussed low cholesterol diet. Will provide pt education on preventing high cholesterol and high cholesterol diet during her visit.     Name and Relationship of Patient/Support Person: Sina Ready -         Education Documentation  No documentation found.        Pat PUENTES  Ambulatory Case Management    1/25/2024, 10:30 EST

## 2024-01-25 NOTE — TELEPHONE ENCOUNTER
[facility]       [facility] has declined application for pt assistance with Repatha. States insurance plan provides coverage for the medication requested. I called [employer] and spoke to representative Jd. He reports Ms Kramer would be responsible for $538.09 per month. Ms Williams aware.    She has follow up appointment with you 1/26/24

## 2024-01-26 ENCOUNTER — OFFICE VISIT (OUTPATIENT)
Dept: FAMILY MEDICINE CLINIC | Facility: CLINIC | Age: 74
End: 2024-01-26
Payer: MEDICARE

## 2024-01-26 VITALS
SYSTOLIC BLOOD PRESSURE: 114 MMHG | HEIGHT: 62 IN | HEART RATE: 81 BPM | BODY MASS INDEX: 20.06 KG/M2 | DIASTOLIC BLOOD PRESSURE: 73 MMHG | TEMPERATURE: 97.1 F | WEIGHT: 109 LBS

## 2024-01-26 DIAGNOSIS — K11.8 MASS OF PAROTID GLAND: ICD-10-CM

## 2024-01-26 DIAGNOSIS — Z87.891 HISTORY OF SMOKING 25-50 PACK YEARS: ICD-10-CM

## 2024-01-26 DIAGNOSIS — Z12.31 SCREENING MAMMOGRAM FOR BREAST CANCER: ICD-10-CM

## 2024-01-26 DIAGNOSIS — R91.1 SOLID NODULE OF LUNG GREATER THAN 8 MM IN DIAMETER: ICD-10-CM

## 2024-01-26 DIAGNOSIS — Z72.0 TOBACCO ABUSE: ICD-10-CM

## 2024-01-26 DIAGNOSIS — K11.8 MASS OF PAROTID GLAND: Primary | ICD-10-CM

## 2024-01-26 NOTE — PROGRESS NOTES
Follow Up Office Visit      Patient Name: Samira Kramer  : 1950   MRN: [id number]     Chief Complaint:    Chief Complaint   Patient presents with    Hyperlipidemia    Hypothyroidism    Heartburn    Insomnia    Hypertension       History of Present Illness: Samira Kramer is a 73 y.o. female who is here today to follow up for lung nodule     Mammogram- order placed  Dexa- 2023  Labs- 2023  Ct chest 2023    Smoking history 1 PPD for 50 years   Pt declines smoking cessation tried chantix in past caused nightmares  Reports allergic reaction to the patches causing swelling and redness     C/o Patient had her NM PET scan 24 to go over the results.  IMPRESSION:                     1. The questioned 9 mm area of pleural thickening versus nodule in the medial right lower lobe is   decreased in size and there is no metabolic activity in this area.  No suspicious findings to   indicate malignancy in the chest.  There is evidence of prior granulomatous disease and underlying   emphysema.     2. Incidentally noted are hypermetabolic circumscribed masses in the parotid glands which are   likely incidental Warthin's tumors or mixed benign tumors.  There are no prior studies of this area   for comparison.    3. There are no suspicious findings to indicate malignancy in the abdomen pelvis or visualized   skeletal structures.        MARÍA ELENA ZARATE DO         Electronically Signed and Approved By: MARÍA ELENA ZARATE DO on 2024 at 9:51                   Subjective      Review of Systems:   Review of Systems   Constitutional:  Negative for fever.   HENT:  Negative for trouble swallowing.    Respiratory:  Negative for cough and shortness of breath.    Cardiovascular:  Negative for chest pain.        Past Medical History:   Past Medical History:   Diagnosis Date    Gastro-esophageal reflux disease without esophagitis     Humerus fracture     RIGHT PROXIMAL    Hyperlipidemia, unspecified      Hypothyroidism, unspecified     Kidney stone     NO CURRENT ISSUES    Macular degeneration 2021    Smoker     Vitamin D deficiency, unspecified        Past Surgical History:   Past Surgical History:   Procedure Laterality Date    APPENDECTOMY      COLONOSCOPY  07/2017, 12/2018    per constantin Dobson, due in 2027    ORIF HUMERUS FRACTURE Right 4/18/2023    Procedure: HUMERUS PROXIMAL OPEN REDUCTION INTERNAL FIXATION;  Surgeon: Keshawn Arellano MD;  Location: [facility] MAIN OR;  Service: Orthopedics;  Laterality: Right;    URETEROSCOPY  02/2018    SCOPE-BLADDER AND KIDNEYS       Family History:   Family History   Problem Relation Age of Onset    Cancer Mother         TYPE NOT SPECIFIED    Cancer Father         TYPE NOT SPECIFIED    Malig Hyperthermia Neg Hx        Social History:   Social History     Socioeconomic History    Marital status:    Tobacco Use    Smoking status: Every Day     Packs/day: 1     Types: Cigarettes     Passive exposure: Never    Smokeless tobacco: Never    Tobacco comments:     INST PER ANESTHESIA PROTOCOL, smoked last yesterday   Vaping Use    Vaping Use: Never used   Substance and Sexual Activity    Alcohol use: Never    Drug use: Never    Sexual activity: Defer       Medications:     Current Outpatient Medications:     alendronate (Fosamax) 70 MG tablet, Take 1 tablet by mouth Every 7 (Seven) Days., Disp: 13 tablet, Rfl: 3    Ascorbic Acid (Vitamin C) 500 MG/5ML liquid, Daily. LAST DOSE 4/13/23, Disp: , Rfl:     Calcium Polycarbophil (FIBER-CAPS PO), Take  by mouth. LAST DOSE 4/13/23, Disp: , Rfl:     carbamide peroxide (Debrox) 6.5 % otic solution, Administer 5 drops into both ears 2 (Two) Times a Day., Disp: 15 mL, Rfl: 2    cholecalciferol (VITAMIN D3) 25 MCG (1000 UT) tablet, Take 1 tablet by mouth Daily., Disp: , Rfl:     desonide (DESOWEN) 0.05 % cream, desonide 0.05 % topical cream apply sparingly and rub gently into the affected area(s) by topical route 2 times per day    "Suspended, Disp: , Rfl:     diclofenac (VOLTAREN) 75 MG EC tablet, Take 1 tablet by mouth twice daily, Disp: 60 tablet, Rfl: 0    Diclofenac Sodium (VOLTAREN) 1 % gel gel, Apply 4 g topically to the appropriate area as directed 4 (Four) Times a Day As Needed (joint pain)., Disp: 350 g, Rfl: 1    Evolocumab (REPATHA) solution auto-injector SureClick injection, Inject 1 mL under the skin into the appropriate area as directed Every 14 (Fourteen) Days., Disp: 2 mL, Rfl: 5    fluticasone (FLONASE) 50 MCG/ACT nasal spray, 2 sprays into the nostril(s) as directed by provider Daily. 2 sprays each nostril daily, Disp: 18.2 mL, Rfl: 1    levothyroxine (SYNTHROID, LEVOTHROID) 25 MCG tablet, Take 1 tablet by mouth Every Morning., Disp: 90 tablet, Rfl: 1    multivitamin with minerals tablet tablet, Take 2 tablets by mouth Daily. For eyes/macular degeneration LAST DOSE 4/13/23, Disp: , Rfl:     traZODone (DESYREL) 50 MG tablet, Take 2 tablets by mouth Every Night., Disp: 180 tablet, Rfl: 1    vitamin B-12 (CYANOCOBALAMIN) 100 MCG tablet, LAST DOSE 4/13/23, Disp: , Rfl:     Zinc 10 MG lozenge, LAST DOSE 4/13/23, Disp: , Rfl:     coenzyme Q10 100 MG capsule, Take 1 capsule by mouth Daily. LAST DOSE 4/13/23 (Patient not taking: Reported on 12/28/2023), Disp: , Rfl:     Current Facility-Administered Medications:     methylPREDNISolone acetate (DEPO-medrol) injection 60 mg, 60 mg, Intramuscular, Once, Colasanti, Chrysalis Nithya, APRN    Allergies:   Allergies   Allergen Reactions    Fenofibrate Hives    Aleve [Naproxen] GI Intolerance    Atorvastatin Myalgia    Nsaids GI Intolerance    Omega 3-6-9 Fatty Acids Diarrhea    Penicillins Hives    Zetia [Ezetimibe] Rash           PHQ-2 Total Score: 0   PHQ-9 Total Score: 0     Objective     Physical Exam:  Vital Signs:   Vitals:    01/26/24 1421   BP: 114/73   Pulse: 81   Temp: 97.1 °F (36.2 °C)   Weight: 49.4 kg (109 lb)   Height: 157.5 cm (62\")     Body mass index is 19.94 kg/m².   BMI is " "within normal parameters. No other follow-up for BMI required.       Physical Exam  Neck:      Thyroid: No thyroid tenderness.      Vascular: No carotid bruit.   Cardiovascular:      Rate and Rhythm: Normal rate and regular rhythm.      Heart sounds: Normal heart sounds. No murmur heard.  Pulmonary:      Effort: Pulmonary effort is normal.      Breath sounds: Normal breath sounds.   Musculoskeletal:      Right lower leg: No edema.      Left lower leg: No edema.   Skin:     General: Skin is warm and dry.   Neurological:      Mental Status: She is alert.             Assessment / Plan      Assessment/Plan:   Diagnoses and all orders for this visit:    1. Solid nodule of lung greater than 8 mm in diameter    2. Mass of parotid gland    3. History of smoking 25-50 pack years    4. Tobacco abuse    5. Screening mammogram for breast cancer  -     Mammo Screening Digital Tomosynthesis Bilateral With CAD; Future         Swollen nodule on greater than 8 millimeters PET/CT results reviewed with patient CT of the chest scheduled for April  Mass of parotid gland CT ordered to further evaluate we will call with results and further recommendations  History of smoking greater than 25 pack years current tobacco abuse declined smoking cessation has tried Chantix nicotine patches discussed starting Wellbutrin      Follow Up:   Return in about 2 months (around 4/2/2024).    Meera Dunlap, MICHELLE    \"Please note that portions of this note were completed with a voice recognition program.\"    "

## 2024-02-22 ENCOUNTER — PATIENT OUTREACH (OUTPATIENT)
Dept: CASE MANAGEMENT | Facility: OTHER | Age: 74
End: 2024-02-22
Payer: MEDICARE

## 2024-02-22 DIAGNOSIS — E78.2 MIXED HYPERLIPIDEMIA: Primary | ICD-10-CM

## 2024-02-22 DIAGNOSIS — Z78.9 STATIN INTOLERANCE: ICD-10-CM

## 2024-02-22 NOTE — OUTREACH NOTE
Patient Outreach    Pt keeping all scheduled appointments. Has pt educational material on high cholesterol and healthy food choices. She has no further needs for ACM.     Name and Relationship of Patient/Support Person: Smaira Kramer - Self        Education Documentation  No documentation found.        Pat PUENTES  Ambulatory Case Management    2/22/2024, 15:03 EST

## 2024-03-27 ENCOUNTER — LAB (OUTPATIENT)
Dept: LAB | Facility: HOSPITAL | Age: 74
End: 2024-03-27
Payer: MEDICARE

## 2024-03-27 DIAGNOSIS — E78.2 MIXED HYPERLIPIDEMIA: ICD-10-CM

## 2024-03-27 DIAGNOSIS — E03.9 ACQUIRED HYPOTHYROIDISM: ICD-10-CM

## 2024-03-27 DIAGNOSIS — Z72.0 TOBACCO ABUSE: ICD-10-CM

## 2024-03-27 LAB
ALBUMIN SERPL-MCNC: 4 G/DL (ref 3.5–5.2)
ALBUMIN/GLOB SERPL: 1.3 G/DL
ALP SERPL-CCNC: 92 U/L (ref 39–117)
ALT SERPL W P-5'-P-CCNC: 15 U/L (ref 1–33)
ANION GAP SERPL CALCULATED.3IONS-SCNC: 9 MMOL/L (ref 5–15)
AST SERPL-CCNC: 22 U/L (ref 1–32)
BASOPHILS # BLD AUTO: 0.03 10*3/MM3 (ref 0–0.2)
BASOPHILS NFR BLD AUTO: 0.6 % (ref 0–1.5)
BILIRUB SERPL-MCNC: 0.3 MG/DL (ref 0–1.2)
BUN SERPL-MCNC: 14 MG/DL (ref 8–23)
BUN/CREAT SERPL: 13.2 (ref 7–25)
CALCIUM SPEC-SCNC: 9.7 MG/DL (ref 8.6–10.5)
CHLORIDE SERPL-SCNC: 105 MMOL/L (ref 98–107)
CHOLEST SERPL-MCNC: 247 MG/DL (ref 0–200)
CO2 SERPL-SCNC: 27 MMOL/L (ref 22–29)
CREAT SERPL-MCNC: 1.06 MG/DL (ref 0.57–1)
DEPRECATED RDW RBC AUTO: 46.2 FL (ref 37–54)
EGFRCR SERPLBLD CKD-EPI 2021: 55.2 ML/MIN/1.73
EOSINOPHIL # BLD AUTO: 0.13 10*3/MM3 (ref 0–0.4)
EOSINOPHIL NFR BLD AUTO: 2.4 % (ref 0.3–6.2)
ERYTHROCYTE [DISTWIDTH] IN BLOOD BY AUTOMATED COUNT: 12.5 % (ref 12.3–15.4)
GLOBULIN UR ELPH-MCNC: 3 GM/DL
GLUCOSE SERPL-MCNC: 84 MG/DL (ref 65–99)
HCT VFR BLD AUTO: 47.6 % (ref 34–46.6)
HDLC SERPL-MCNC: 39 MG/DL (ref 40–60)
HGB BLD-MCNC: 16.7 G/DL (ref 12–15.9)
IMM GRANULOCYTES # BLD AUTO: 0.03 10*3/MM3 (ref 0–0.05)
IMM GRANULOCYTES NFR BLD AUTO: 0.6 % (ref 0–0.5)
LDLC SERPL CALC-MCNC: 173 MG/DL (ref 0–100)
LDLC/HDLC SERPL: 4.38 {RATIO}
LYMPHOCYTES # BLD AUTO: 1.62 10*3/MM3 (ref 0.7–3.1)
LYMPHOCYTES NFR BLD AUTO: 29.8 % (ref 19.6–45.3)
MCH RBC QN AUTO: 35.1 PG (ref 26.6–33)
MCHC RBC AUTO-ENTMCNC: 35.1 G/DL (ref 31.5–35.7)
MCV RBC AUTO: 100 FL (ref 79–97)
MONOCYTES # BLD AUTO: 0.37 10*3/MM3 (ref 0.1–0.9)
MONOCYTES NFR BLD AUTO: 6.8 % (ref 5–12)
NEUTROPHILS NFR BLD AUTO: 3.26 10*3/MM3 (ref 1.7–7)
NEUTROPHILS NFR BLD AUTO: 59.8 % (ref 42.7–76)
NRBC BLD AUTO-RTO: 0 /100 WBC (ref 0–0.2)
PLATELET # BLD AUTO: 160 10*3/MM3 (ref 140–450)
PMV BLD AUTO: 10.5 FL (ref 6–12)
POTASSIUM SERPL-SCNC: 4.5 MMOL/L (ref 3.5–5.2)
PROT SERPL-MCNC: 7 G/DL (ref 6–8.5)
RBC # BLD AUTO: 4.76 10*6/MM3 (ref 3.77–5.28)
SODIUM SERPL-SCNC: 141 MMOL/L (ref 136–145)
T4 FREE SERPL-MCNC: 1.22 NG/DL (ref 0.93–1.7)
TRIGL SERPL-MCNC: 185 MG/DL (ref 0–150)
TSH SERPL DL<=0.05 MIU/L-ACNC: 4.77 UIU/ML (ref 0.27–4.2)
VLDLC SERPL-MCNC: 35 MG/DL (ref 5–40)
WBC NRBC COR # BLD AUTO: 5.44 10*3/MM3 (ref 3.4–10.8)

## 2024-03-27 PROCEDURE — 84439 ASSAY OF FREE THYROXINE: CPT

## 2024-03-27 PROCEDURE — 84443 ASSAY THYROID STIM HORMONE: CPT

## 2024-03-27 PROCEDURE — 80061 LIPID PANEL: CPT

## 2024-03-27 PROCEDURE — 85025 COMPLETE CBC W/AUTO DIFF WBC: CPT

## 2024-03-27 PROCEDURE — 80053 COMPREHEN METABOLIC PANEL: CPT

## 2024-03-28 NOTE — PROGRESS NOTES
Please give the patient the following message:  Appointment can be with another healthcare provider if one is not available with yours.

## 2024-03-29 RX ORDER — CLOBETASOL PROPIONATE 0.5 MG/G
CREAM TOPICAL
COMMUNITY
Start: 2024-01-17

## 2024-04-01 ENCOUNTER — HOSPITAL ENCOUNTER (OUTPATIENT)
Dept: CT IMAGING | Facility: HOSPITAL | Age: 74
Discharge: HOME | End: 2024-04-01
Admitting: NURSE PRACTITIONER
Payer: MEDICARE

## 2024-04-01 DIAGNOSIS — R91.1 SOLID NODULE OF LUNG GREATER THAN 8 MM IN DIAMETER: ICD-10-CM

## 2024-04-01 DIAGNOSIS — K11.8 MASS OF PAROTID GLAND: ICD-10-CM

## 2024-04-01 PROCEDURE — 25510000001 IOPAMIDOL 61 % SOLUTION: Performed by: NURSE PRACTITIONER

## 2024-04-01 PROCEDURE — 71260 CT THORAX DX C+: CPT

## 2024-04-01 PROCEDURE — 70487 CT MAXILLOFACIAL W/DYE: CPT

## 2024-04-01 RX ADMIN — IOPAMIDOL 100 ML: 612 INJECTION, SOLUTION INTRAVENOUS at 14:07

## 2024-04-02 ENCOUNTER — OFFICE VISIT (OUTPATIENT)
Dept: FAMILY MEDICINE CLINIC | Facility: CLINIC | Age: 74
End: 2024-04-02
Payer: MEDICARE

## 2024-04-02 VITALS
WEIGHT: 110 LBS | BODY MASS INDEX: 20.24 KG/M2 | HEART RATE: 73 BPM | SYSTOLIC BLOOD PRESSURE: 115 MMHG | TEMPERATURE: 97.8 F | DIASTOLIC BLOOD PRESSURE: 76 MMHG | HEIGHT: 62 IN | OXYGEN SATURATION: 98 %

## 2024-04-02 DIAGNOSIS — L02.91 ABSCESS: ICD-10-CM

## 2024-04-02 DIAGNOSIS — R91.1 SOLID NODULE OF LUNG GREATER THAN 8 MM IN DIAMETER: ICD-10-CM

## 2024-04-02 DIAGNOSIS — E55.9 VITAMIN D DEFICIENCY: ICD-10-CM

## 2024-04-02 DIAGNOSIS — E03.9 ACQUIRED HYPOTHYROIDISM: ICD-10-CM

## 2024-04-02 DIAGNOSIS — K11.8 PAROTID MASS: Primary | ICD-10-CM

## 2024-04-02 DIAGNOSIS — Z87.891 HISTORY OF SMOKING 25-50 PACK YEARS: ICD-10-CM

## 2024-04-02 DIAGNOSIS — Z72.0 TOBACCO ABUSE: ICD-10-CM

## 2024-04-02 DIAGNOSIS — M81.0 OSTEOPOROSIS, UNSPECIFIED OSTEOPOROSIS TYPE, UNSPECIFIED PATHOLOGICAL FRACTURE PRESENCE: ICD-10-CM

## 2024-04-02 DIAGNOSIS — R07.9 CHEST PAIN, UNSPECIFIED TYPE: ICD-10-CM

## 2024-04-02 DIAGNOSIS — F51.01 PRIMARY INSOMNIA: ICD-10-CM

## 2024-04-02 DIAGNOSIS — Z78.9 STATIN INTOLERANCE: ICD-10-CM

## 2024-04-02 DIAGNOSIS — E78.2 MIXED HYPERLIPIDEMIA: Primary | ICD-10-CM

## 2024-04-02 DIAGNOSIS — N28.9 RENAL INSUFFICIENCY: ICD-10-CM

## 2024-04-02 PROCEDURE — 87205 SMEAR GRAM STAIN: CPT | Performed by: NURSE PRACTITIONER

## 2024-04-02 PROCEDURE — 87070 CULTURE OTHR SPECIMN AEROBIC: CPT | Performed by: NURSE PRACTITIONER

## 2024-04-02 RX ORDER — LEVOTHYROXINE SODIUM 0.05 MG/1
50 TABLET ORAL EVERY MORNING
Qty: 90 TABLET | Refills: 1 | Status: SHIPPED | OUTPATIENT
Start: 2024-04-02

## 2024-04-02 RX ORDER — CHLORAL HYDRATE 500 MG
CAPSULE ORAL
COMMUNITY

## 2024-04-02 RX ORDER — TRAZODONE HYDROCHLORIDE 50 MG/1
100 TABLET ORAL NIGHTLY
Qty: 180 TABLET | Refills: 1 | Status: SHIPPED | OUTPATIENT
Start: 2024-04-02

## 2024-04-02 RX ORDER — CLINDAMYCIN HYDROCHLORIDE 300 MG/1
300 CAPSULE ORAL 3 TIMES DAILY
Qty: 30 CAPSULE | Refills: 0 | Status: SHIPPED | OUTPATIENT
Start: 2024-04-02

## 2024-04-02 NOTE — PROGRESS NOTES
Follow Up Office Visit      Patient Name: Samira Kramer  : 1950   MRN: [id number]     Chief Complaint:    Chief Complaint   Patient presents with    Hypothyroidism    Hyperlipidemia    Insomnia       History of Present Illness: Samira Kramer is a 73 y.o. female who is here today to follow up for HTN, hyperlipidemia, hypothyroidism, GERD and lung nodule    review lab results     Labs-3/2024  Mammo- never refuses   Dexa- 2023  Ct chest 2024    C/o area red draining on her back,  popped it yesterday    Pt reports she was unable to afford the copay for repatha and did not qualify for patient assistance     Pt c/o pain in right side chest, when she gets upset, denies radiation, started about two week prior  Denies shortness of breath  Denies n/v   States one episode caught her breath     Stress test   CONCLUSION:      1. Limited exercise tolerance.      2. No electrocardiographic evidence of exercise-induced ischemia at this         workload.   CLEVELAND ARMANDO M.D.         2019 20:32         Subjective      Review of Systems:   Review of Systems   Constitutional:  Negative for fatigue.   HENT:  Negative for ear pain and sore throat.    Respiratory:  Negative for cough and shortness of breath.    Cardiovascular:  Positive for chest pain.   Gastrointestinal:  Negative for abdominal pain, constipation, diarrhea, nausea and vomiting.   Genitourinary:  Negative for dysuria.   Musculoskeletal:  Negative for myalgias.   Neurological:  Negative for dizziness and headaches.        Past Medical History:   Past Medical History:   Diagnosis Date    Gastro-esophageal reflux disease without esophagitis     Humerus fracture     RIGHT PROXIMAL    Hyperlipidemia, unspecified     Hypothyroidism, unspecified     Kidney stone     NO CURRENT ISSUES    Macular degeneration     Smoker     Vitamin D deficiency, unspecified        Past Surgical History:   Past Surgical History:    Procedure Laterality Date    APPENDECTOMY      COLONOSCOPY  07/2017, 12/2018    per Dr. Hernandez, normal, due in 2027    ORIF HUMERUS FRACTURE Right 4/18/2023    Procedure: HUMERUS PROXIMAL OPEN REDUCTION INTERNAL FIXATION;  Surgeon: Keshawn Arellano MD;  Location: [facility] MAIN OR;  Service: Orthopedics;  Laterality: Right;    URETEROSCOPY  02/2018    SCOPE-BLADDER AND KIDNEYS       Family History:   Family History   Problem Relation Age of Onset    Cancer Mother         TYPE NOT SPECIFIED    Cancer Father         TYPE NOT SPECIFIED    Malig Hyperthermia Neg Hx        Social History:   Social History     Socioeconomic History    Marital status:    Tobacco Use    Smoking status: Every Day     Current packs/day: 1.00     Types: Cigarettes     Passive exposure: Never    Smokeless tobacco: Never    Tobacco comments:     INST PER ANESTHESIA PROTOCOL, smoked last yesterday   Vaping Use    Vaping status: Never Used   Substance and Sexual Activity    Alcohol use: Never    Drug use: Never    Sexual activity: Defer       Medications:     Current Outpatient Medications:     alendronate (Fosamax) 70 MG tablet, Take 1 tablet by mouth Every 7 (Seven) Days., Disp: 13 tablet, Rfl: 3    Ascorbic Acid (Vitamin C) 500 MG/5ML liquid, Daily. LAST DOSE 4/13/23, Disp: , Rfl:     Calcium Polycarbophil (FIBER-CAPS PO), Take  by mouth. LAST DOSE 4/13/23, Disp: , Rfl:     carbamide peroxide (Debrox) 6.5 % otic solution, Administer 5 drops into both ears 2 (Two) Times a Day., Disp: 15 mL, Rfl: 2    cholecalciferol (VITAMIN D3) 25 MCG (1000 UT) tablet, Take 1 tablet by mouth Daily., Disp: , Rfl:     clobetasol propionate (TEMOVATE) 0.05 % cream, APPLY CREAM TOPICALLY TWICE DAILY TO SORE ON EAR, Disp: , Rfl:     desonide (DESOWEN) 0.05 % cream, desonide 0.05 % topical cream apply sparingly and rub gently into the affected area(s) by topical route 2 times per day   Suspended, Disp: , Rfl:     diclofenac (VOLTAREN) 75 MG EC tablet, Take 1  "tablet by mouth twice daily, Disp: 60 tablet, Rfl: 0    Diclofenac Sodium (VOLTAREN) 1 % gel gel, Apply 4 g topically to the appropriate area as directed 4 (Four) Times a Day As Needed (joint pain)., Disp: 350 g, Rfl: 1    Evolocumab (REPATHA) solution auto-injector SureClick injection, Inject 1 mL under the skin into the appropriate area as directed Every 14 (Fourteen) Days., Disp: 2 mL, Rfl: 5    fluticasone (FLONASE) 50 MCG/ACT nasal spray, 2 sprays into the nostril(s) as directed by provider Daily. 2 sprays each nostril daily, Disp: 18.2 mL, Rfl: 1    levothyroxine (SYNTHROID, LEVOTHROID) 50 MCG tablet, Take 1 tablet by mouth Every Morning., Disp: 90 tablet, Rfl: 1    multivitamin with minerals tablet tablet, Take 2 tablets by mouth Daily. For eyes/macular degeneration LAST DOSE 4/13/23, Disp: , Rfl:     Omega-3 Fatty Acids (fish oil) 1000 MG capsule capsule, Take  by mouth Daily With Breakfast., Disp: , Rfl:     traZODone (DESYREL) 50 MG tablet, Take 2 tablets by mouth Every Night., Disp: 180 tablet, Rfl: 1    vitamin B-12 (CYANOCOBALAMIN) 100 MCG tablet, LAST DOSE 4/13/23, Disp: , Rfl:     Zinc 10 MG lozenge, LAST DOSE 4/13/23, Disp: , Rfl:     clindamycin (Cleocin) 300 MG capsule, Take 1 capsule by mouth 3 (Three) Times a Day., Disp: 30 capsule, Rfl: 0  No current facility-administered medications for this visit.    Allergies:   Allergies   Allergen Reactions    Fenofibrate Hives    Aleve [Naproxen] GI Intolerance    Atorvastatin Myalgia    Nsaids GI Intolerance    Omega 3-6-9 Fatty Acids Diarrhea    Penicillins Hives    Sulfa Antibiotics Rash    Zetia [Ezetimibe] Rash             Objective     Physical Exam:  Vital Signs:   Vitals:    04/02/24 0912   BP: 115/76   Pulse: 73   Temp: 97.8 °F (36.6 °C)   SpO2: 98%   Weight: 49.9 kg (110 lb)   Height: 157.5 cm (62\")     Body mass index is 20.12 kg/m².   BMI is within normal parameters. No other follow-up for BMI required.       Physical Exam  HENT:      Right " Ear: Tympanic membrane normal.      Left Ear: Tympanic membrane normal.      Nose: Nose normal.      Mouth/Throat:      Mouth: Mucous membranes are moist.   Eyes:      Conjunctiva/sclera: Conjunctivae normal.   Neck:      Vascular: No carotid bruit.   Cardiovascular:      Rate and Rhythm: Normal rate and regular rhythm.      Heart sounds: Normal heart sounds. No murmur heard.  Pulmonary:      Effort: Pulmonary effort is normal.      Breath sounds: Normal breath sounds.   Abdominal:      General: Bowel sounds are normal.      Palpations: Abdomen is soft.   Musculoskeletal:      Right lower leg: No edema.      Left lower leg: No edema.   Skin:     General: Skin is warm and dry.             Comments: Erythematous area draining purulent thick drainage    Neurological:      Mental Status: She is alert.   Psychiatric:         Mood and Affect: Mood normal.         Behavior: Behavior normal.           ECG 12 Lead    Date/Time: 4/2/2024 10:29 AM  Performed by: Jason Dunlap APRN    Authorized by: Jason Dunlap APRN  Comparison: compared with previous ECG from 5/9/2023  Similar to previous ECG  Comparison to previous ECG: HEART RATE= 70  bpm  RR Interval= 860  ms  SD Interval= 190  ms  P Horizontal Axis= -7  deg  P Front Axis= 58  deg  QRSD Interval= 91  ms  QT Interval= 376  ms  QRS Axis= 49  deg  T Wave Axis= 41  deg  - OTHERWISE NORMAL ECG -  Sinus rhythm  RSR' in V1 or V2, probably normal variant      Rhythm: sinus rhythm  Conduction: conduction normal  ST Segments: ST segments normal  T Waves: T waves normal           Assessment / Plan      Assessment/Plan:   Diagnoses and all orders for this visit:    1. Mixed hyperlipidemia (Primary)  -     Ambulatory Referral to Cardiology    2. Statin intolerance  -     Ambulatory Referral to Cardiology    3. Acquired hypothyroidism  -     TSH; Future  -     T4, Free; Future  -     levothyroxine (SYNTHROID, LEVOTHROID) 50 MCG tablet; Take 1 tablet by mouth  "Every Morning.  Dispense: 90 tablet; Refill: 1    4. Primary insomnia    5. Osteoporosis, unspecified osteoporosis type, unspecified pathological fracture presence    6. History of smoking 25-50 pack years    7. Tobacco abuse    8. Vitamin D deficiency    9. Solid nodule of lung greater than 8 mm in diameter    10. Renal insufficiency  -     Comprehensive Metabolic Panel; Future    11. Abscess  -     Wound Culture - Wound, Back; Future  -     Wound Culture - Wound, Back    12. Chest pain, unspecified type  -     ECG 12 Lead  -     Ambulatory Referral to Cardiology    Other orders  -     traZODone (DESYREL) 50 MG tablet; Take 2 tablets by mouth Every Night.  Dispense: 180 tablet; Refill: 1  -     clindamycin (Cleocin) 300 MG capsule; Take 1 capsule by mouth 3 (Three) Times a Day.  Dispense: 30 capsule; Refill: 0       Hyperlipidemia LDL above goal statin intolerant unable to qualify for patient assistance for Repatha unable to afford her co-pay for Repatha with chest pain EKG normal in office will refer to cardiology  Hypothyroidism TSH elevated will increase Synthroid to 50 mcg daily recheck labs in 6 weeks  Insomnia states on trazodone denies morning sedation  Osteoporosis currently taking Fosamax bone density up-to-date  History of greater than 30 smoking pack years with tobacco abuse declined smoking cessation CT low-dose of the chest up-to-date solid nodule greater than 8 mm  Renal insufficiency will obtain CMP to monitor recommend increase water intake avoid NSAIDs  Abscess will obtain urine culture start clindamycin as patient is allergic to penicillins and cephalosporins will call with results and further recommendation recommend clean area with soap and water and dress daily        Follow Up:   Return in about 6 months (around 10/2/2024).    MICHELLE Donaldson    \"Please note that portions of this note were completed with a voice recognition program.\"    "

## 2024-04-04 LAB
BACTERIA SPEC AEROBE CULT: NORMAL
GRAM STN SPEC: NORMAL
GRAM STN SPEC: NORMAL

## 2024-04-22 ENCOUNTER — OFFICE VISIT (OUTPATIENT)
Dept: CARDIOLOGY | Facility: CLINIC | Age: 74
End: 2024-04-22
Payer: MEDICARE

## 2024-04-22 VITALS
HEART RATE: 71 BPM | BODY MASS INDEX: 20.8 KG/M2 | SYSTOLIC BLOOD PRESSURE: 130 MMHG | HEIGHT: 62 IN | WEIGHT: 113 LBS | DIASTOLIC BLOOD PRESSURE: 81 MMHG

## 2024-04-22 DIAGNOSIS — E78.2 MIXED HYPERLIPIDEMIA: Primary | ICD-10-CM

## 2024-04-22 DIAGNOSIS — I25.10 CORONARY ARTERY CALCIFICATION: ICD-10-CM

## 2024-04-22 DIAGNOSIS — I25.84 CORONARY ARTERY CALCIFICATION: ICD-10-CM

## 2024-04-22 DIAGNOSIS — R07.9 CHEST PAIN, UNSPECIFIED TYPE: ICD-10-CM

## 2024-04-22 DIAGNOSIS — Z72.0 TOBACCO ABUSE: ICD-10-CM

## 2024-04-22 PROCEDURE — 99204 OFFICE O/P NEW MOD 45 MIN: CPT | Performed by: INTERNAL MEDICINE

## 2024-04-22 NOTE — PROGRESS NOTES
"Chief Complaint  Chest Pain, Hyperlipidemia, and Establish Care    Subjective        Samira Kramer presents to [facility] CARDIOLOGY  History of present illness:    Patient is a 73-year-old female with past medical history significant for hyperlipidemia and coronary artery calcification.  She also has long smoking history and continues to smoke.  She has tried what sounds like Chantix with nightmares in the past.  She also has an allergic reaction to the nicotine patch.  She presents with chest pain that is right-sided and feels \"way inside.\"  She states is mainly when she is up moving around and describes it as a dull pain.  There is no nausea, vomiting or burping.  She does state that aspirin helps.  She denies any alcohol use.  Mother and father have no heart disease.      Past Medical History:   Diagnosis Date    Gastro-esophageal reflux disease without esophagitis     Humerus fracture     RIGHT PROXIMAL    Hyperlipidemia, unspecified     Hypothyroidism, unspecified     Kidney stone     NO CURRENT ISSUES    Macular degeneration 2021    Smoker     Vitamin D deficiency, unspecified          Past Surgical History:   Procedure Laterality Date    APPENDECTOMY      COLONOSCOPY  07/2017, 12/2018    per Dr. Hernandez, normal, due in 2027    ORIF HUMERUS FRACTURE Right 4/18/2023    Procedure: HUMERUS PROXIMAL OPEN REDUCTION INTERNAL FIXATION;  Surgeon: Keshawn Arellano MD;  Location: [facility] (formerly [facility]);  Service: Orthopedics;  Laterality: Right;    URETEROSCOPY  02/2018    SCOPE-BLADDER AND KIDNEYS          Social History     Socioeconomic History    Marital status:    Tobacco Use    Smoking status: Every Day     Current packs/day: 1.00     Types: Cigarettes     Passive exposure: Never    Smokeless tobacco: Never    Tobacco comments:     INST PER ANESTHESIA PROTOCOL, smoked last yesterday   Vaping Use    Vaping status: Never Used   Substance and Sexual Activity    Alcohol use: Never    Drug " use: Never    Sexual activity: Defer         Family History   Problem Relation Age of Onset    Cancer Mother         TYPE NOT SPECIFIED    Cancer Father         TYPE NOT SPECIFIED    Malig Hyperthermia Neg Hx           Allergies   Allergen Reactions    Fenofibrate Hives    Aleve [Naproxen] GI Intolerance    Atorvastatin Myalgia    Nsaids GI Intolerance    Omega 3-6-9 Fatty Acids Diarrhea    Penicillins Hives    Sulfa Antibiotics Rash    Zetia [Ezetimibe] Rash            Current Outpatient Medications:     alendronate (Fosamax) 70 MG tablet, Take 1 tablet by mouth Every 7 (Seven) Days., Disp: 13 tablet, Rfl: 3    Ascorbic Acid (Vitamin C) 500 MG/5ML liquid, Daily. LAST DOSE 4/13/23, Disp: , Rfl:     Calcium Polycarbophil (FIBER-CAPS PO), Take  by mouth. LAST DOSE 4/13/23, Disp: , Rfl:     carbamide peroxide (Debrox) 6.5 % otic solution, Administer 5 drops into both ears 2 (Two) Times a Day., Disp: 15 mL, Rfl: 2    cholecalciferol (VITAMIN D3) 25 MCG (1000 UT) tablet, Take 1 tablet by mouth Daily., Disp: , Rfl:     clindamycin (Cleocin) 300 MG capsule, Take 1 capsule by mouth 3 (Three) Times a Day., Disp: 30 capsule, Rfl: 0    clobetasol propionate (TEMOVATE) 0.05 % cream, APPLY CREAM TOPICALLY TWICE DAILY TO SORE ON EAR, Disp: , Rfl:     desonide (DESOWEN) 0.05 % cream, desonide 0.05 % topical cream apply sparingly and rub gently into the affected area(s) by topical route 2 times per day   Suspended, Disp: , Rfl:     Diclofenac Sodium (VOLTAREN) 1 % gel gel, Apply 4 g topically to the appropriate area as directed 4 (Four) Times a Day As Needed (joint pain)., Disp: 350 g, Rfl: 1    fluticasone (FLONASE) 50 MCG/ACT nasal spray, 2 sprays into the nostril(s) as directed by provider Daily. 2 sprays each nostril daily, Disp: 18.2 mL, Rfl: 1    levothyroxine (SYNTHROID, LEVOTHROID) 50 MCG tablet, Take 1 tablet by mouth Every Morning., Disp: 90 tablet, Rfl: 1    multivitamin with minerals tablet tablet, Take 2 tablets by  "mouth Daily. For eyes/macular degeneration LAST DOSE 4/13/23, Disp: , Rfl:     Omega-3 Fatty Acids (fish oil) 1000 MG capsule capsule, Take  by mouth Daily With Breakfast., Disp: , Rfl:     traZODone (DESYREL) 50 MG tablet, Take 2 tablets by mouth Every Night., Disp: 180 tablet, Rfl: 1    vitamin B-12 (CYANOCOBALAMIN) 100 MCG tablet, LAST DOSE 4/13/23, Disp: , Rfl:     Zinc 10 MG lozenge, LAST DOSE 4/13/23, Disp: , Rfl:       ROS:  Cardiac review of systems positive for chest pain    Objective     /81   Pulse 71   Ht 157.5 cm (62\")   Wt 51.3 kg (113 lb)   BMI 20.67 kg/m²       General Appearance:   well developed  well nourished  HENT:   oropharynx moist  lips not cyanotic  Respiratory:  no respiratory distress  normal breath sounds  no rales  Cardiovascular:  no jugular venous distention  regular rhythm  S1 normal, S2 normal  no S3, no S4   no murmur  no rub, no thrill  No carotid bruit  pedal pulses normal  lower extremity edema: none    Musculoskeletal:  no clubbing of fingers.   normocephalic, head atraumatic  Skin:   warm, dry  Psychiatric:  judgement and insight appropriate  normal mood and affect    ECHO:    STRESS:    CATH:  No results found for this or any previous visit.    BMP:     Glucose   Date Value Ref Range Status   03/27/2024 84 65 - 99 mg/dL Final     BUN   Date Value Ref Range Status   03/27/2024 14 8 - 23 mg/dL Final     Creatinine   Date Value Ref Range Status   03/27/2024 1.06 (H) 0.57 - 1.00 mg/dL Final     Sodium   Date Value Ref Range Status   03/27/2024 141 136 - 145 mmol/L Final     Potassium   Date Value Ref Range Status   03/27/2024 4.5 3.5 - 5.2 mmol/L Final     Comment:     Slight hemolysis detected by analyzer. Result may be falsely elevated.     Chloride   Date Value Ref Range Status   03/27/2024 105 98 - 107 mmol/L Final     CO2   Date Value Ref Range Status   03/27/2024 27.0 22.0 - 29.0 mmol/L Final     Calcium   Date Value Ref Range Status   03/27/2024 9.7 8.6 - 10.5 " mg/dL Final     BUN/Creatinine Ratio   Date Value Ref Range Status   03/27/2024 13.2 7.0 - 25.0 Final     Anion Gap   Date Value Ref Range Status   03/27/2024 9.0 5.0 - 15.0 mmol/L Final     eGFR   Date Value Ref Range Status   03/27/2024 55.2 (L) >60.0 mL/min/1.73 Final     LIPIDS:  Total Cholesterol   Date Value Ref Range Status   03/27/2024 247 (H) 0 - 200 mg/dL Final     Triglycerides   Date Value Ref Range Status   03/27/2024 185 (H) 0 - 150 mg/dL Final     HDL Cholesterol   Date Value Ref Range Status   03/27/2024 39 (L) 40 - 60 mg/dL Final     LDL Cholesterol    Date Value Ref Range Status   03/27/2024 173 (H) 0 - 100 mg/dL Final     VLDL Cholesterol   Date Value Ref Range Status   03/27/2024 35 5 - 40 mg/dL Final     LDL/HDL Ratio   Date Value Ref Range Status   03/27/2024 4.38  Final         Procedures             ASSESSMENT:  Diagnoses and all orders for this visit:    1. Mixed hyperlipidemia (Primary)    2. Chest pain, unspecified type  -     Stress Test With Myocardial Perfusion One Day; Future    3. Coronary artery calcification    4. Tobacco abuse         PLAN:    1.  For patient's chest pain I did ask her to get a stress test.  She does have risk factors of coronary artery calcification, very elevated cholesterol and not tolerant of medications, and tobacco abuse.  She wants to hold off as she is very busy until June.  I told her if it got worse to let us know.  2.  Patient has been intolerant of statins, Zetia and Repatha was cost prohibitive.  3.  Encourage smoking cessation.  We talked about the nicotine gum.  She had side effects with what sounds like Chantix and nicotine patches.  4.  Blood pressures under good control.      Return in about 2 months (around 6/28/2024) for hayley paredes.     Patient was given instructions and counseling regarding her condition or for health maintenance advice. Please see specific information pulled into the AVS if appropriate.         Bao Canales MD    4/22/2024  15:51 EDT

## 2024-04-25 ENCOUNTER — TELEPHONE (OUTPATIENT)
Dept: CASE MANAGEMENT | Facility: OTHER | Age: 74
End: 2024-04-25
Payer: MEDICARE

## 2024-04-25 RX ORDER — ROSUVASTATIN CALCIUM 5 MG/1
5 TABLET, COATED ORAL DAILY
Qty: 90 TABLET | Refills: 0 | Status: SHIPPED | OUTPATIENT
Start: 2024-04-25

## 2024-04-25 NOTE — TELEPHONE ENCOUNTER
Samira called stating she is willing to try Crestor 5 mg for her cholesterol. States her friends take this medication with no side effects.

## 2024-05-09 ENCOUNTER — LAB (OUTPATIENT)
Dept: LAB | Facility: HOSPITAL | Age: 74
End: 2024-05-09
Payer: MEDICARE

## 2024-05-09 DIAGNOSIS — N28.9 RENAL INSUFFICIENCY: ICD-10-CM

## 2024-05-09 DIAGNOSIS — E03.9 ACQUIRED HYPOTHYROIDISM: ICD-10-CM

## 2024-05-09 LAB
ALBUMIN SERPL-MCNC: 4 G/DL (ref 3.5–5.2)
ALBUMIN/GLOB SERPL: 1.3 G/DL
ALP SERPL-CCNC: 107 U/L (ref 39–117)
ALT SERPL W P-5'-P-CCNC: 15 U/L (ref 1–33)
ANION GAP SERPL CALCULATED.3IONS-SCNC: 10.9 MMOL/L (ref 5–15)
AST SERPL-CCNC: 22 U/L (ref 1–32)
BILIRUB SERPL-MCNC: 0.3 MG/DL (ref 0–1.2)
BUN SERPL-MCNC: 13 MG/DL (ref 8–23)
BUN/CREAT SERPL: 13.7 (ref 7–25)
CALCIUM SPEC-SCNC: 9.9 MG/DL (ref 8.6–10.5)
CHLORIDE SERPL-SCNC: 102 MMOL/L (ref 98–107)
CO2 SERPL-SCNC: 27.1 MMOL/L (ref 22–29)
CREAT SERPL-MCNC: 0.95 MG/DL (ref 0.57–1)
EGFRCR SERPLBLD CKD-EPI 2021: 63 ML/MIN/1.73
GLOBULIN UR ELPH-MCNC: 3.1 GM/DL
GLUCOSE SERPL-MCNC: 92 MG/DL (ref 65–99)
POTASSIUM SERPL-SCNC: 4.2 MMOL/L (ref 3.5–5.2)
PROT SERPL-MCNC: 7.1 G/DL (ref 6–8.5)
SODIUM SERPL-SCNC: 140 MMOL/L (ref 136–145)
T4 FREE SERPL-MCNC: 1.16 NG/DL (ref 0.93–1.7)
TSH SERPL DL<=0.05 MIU/L-ACNC: 2.12 UIU/ML (ref 0.27–4.2)

## 2024-05-09 PROCEDURE — 84443 ASSAY THYROID STIM HORMONE: CPT

## 2024-05-09 PROCEDURE — 80053 COMPREHEN METABOLIC PANEL: CPT

## 2024-05-09 PROCEDURE — 84439 ASSAY OF FREE THYROXINE: CPT

## 2024-06-04 ENCOUNTER — HOSPITAL ENCOUNTER (OUTPATIENT)
Dept: NUCLEAR MEDICINE | Facility: HOSPITAL | Age: 74
Discharge: HOME | End: 2024-06-04
Admitting: INTERNAL MEDICINE
Payer: MEDICARE

## 2024-06-04 DIAGNOSIS — R07.9 CHEST PAIN, UNSPECIFIED TYPE: ICD-10-CM

## 2024-06-04 PROCEDURE — A9502 TC99M TETROFOSMIN: HCPCS | Performed by: INTERNAL MEDICINE

## 2024-06-04 PROCEDURE — 78452 HT MUSCLE IMAGE SPECT MULT: CPT

## 2024-06-04 PROCEDURE — 25010000002 REGADENOSON 0.4 MG/5ML SOLUTION: Performed by: INTERNAL MEDICINE

## 2024-06-04 PROCEDURE — 0 TECHNETIUM TETROFOSMIN KIT: Performed by: INTERNAL MEDICINE

## 2024-06-04 PROCEDURE — 93017 CV STRESS TEST TRACING ONLY: CPT

## 2024-06-04 RX ORDER — REGADENOSON 0.08 MG/ML
0.4 INJECTION, SOLUTION INTRAVENOUS
Status: COMPLETED | OUTPATIENT
Start: 2024-06-04 | End: 2024-06-04

## 2024-06-04 RX ADMIN — TETROFOSMIN 1 DOSE: 1.38 INJECTION, POWDER, LYOPHILIZED, FOR SOLUTION INTRAVENOUS at 10:04

## 2024-06-04 RX ADMIN — TETROFOSMIN 1 DOSE: 1.38 INJECTION, POWDER, LYOPHILIZED, FOR SOLUTION INTRAVENOUS at 11:29

## 2024-06-04 RX ADMIN — REGADENOSON 0.4 MG: 0.08 INJECTION, SOLUTION INTRAVENOUS at 11:29

## 2024-06-05 ENCOUNTER — TELEPHONE (OUTPATIENT)
Dept: CARDIOLOGY | Facility: CLINIC | Age: 74
End: 2024-06-05
Payer: MEDICARE

## 2024-06-05 LAB
BH CV IMMEDIATE POST TECH DATA BLOOD PRESSURE: NORMAL MMHG
BH CV IMMEDIATE POST TECH DATA HEART RATE: 119 BPM
BH CV IMMEDIATE POST TECH DATA OXYGEN SATS: 98 %
BH CV REST NUCLEAR ISOTOPE DOSE: 10.5 MCI
BH CV SIX MINUTE RECOVERY TECH DATA BLOOD PRESSURE: NORMAL
BH CV SIX MINUTE RECOVERY TECH DATA HEART RATE: 96 BPM
BH CV SIX MINUTE RECOVERY TECH DATA OXYGEN SATURATION: 98 %
BH CV STRESS BP STAGE 1: NORMAL
BH CV STRESS BP STAGE 2: NORMAL
BH CV STRESS COMMENTS STAGE 1: NORMAL
BH CV STRESS COMMENTS STAGE 2: NORMAL
BH CV STRESS DOSE REGADENOSON STAGE 1: 0.4
BH CV STRESS DURATION MIN STAGE 1: 0
BH CV STRESS DURATION MIN STAGE 2: 4
BH CV STRESS DURATION SEC STAGE 1: 10
BH CV STRESS DURATION SEC STAGE 2: 0
BH CV STRESS HR STAGE 1: 98
BH CV STRESS HR STAGE 2: 117
BH CV STRESS NUCLEAR ISOTOPE DOSE: 37 MCI
BH CV STRESS O2 STAGE 1: 99
BH CV STRESS O2 STAGE 2: 98
BH CV STRESS PROTOCOL 1: NORMAL
BH CV STRESS PROTOCOL 2 BP STAGE 1: NORMAL
BH CV STRESS PROTOCOL 2 BP STAGE 2: NORMAL
BH CV STRESS PROTOCOL 2 COMMENTS STAGE 1: NORMAL
BH CV STRESS PROTOCOL 2 COMMENTS STAGE 2: NORMAL
BH CV STRESS PROTOCOL 2 DOSE REGADENOSON STAGE 1: 0.4
BH CV STRESS PROTOCOL 2 DURATION MIN STAGE 1: 0
BH CV STRESS PROTOCOL 2 DURATION MIN STAGE 2: 4
BH CV STRESS PROTOCOL 2 DURATION SEC STAGE 1: 10
BH CV STRESS PROTOCOL 2 DURATION SEC STAGE 2: 0
BH CV STRESS PROTOCOL 2 HR STAGE 1: 124
BH CV STRESS PROTOCOL 2 HR STAGE 2: 122
BH CV STRESS PROTOCOL 2 O2 STAGE 1: 100
BH CV STRESS PROTOCOL 2 O2 STAGE 2: 97
BH CV STRESS PROTOCOL 2 STAGE 1: 1
BH CV STRESS PROTOCOL 2 STAGE 2: 2
BH CV STRESS PROTOCOL 2: NORMAL
BH CV STRESS PROTOCOL 2: NORMAL
BH CV STRESS RECOVERY BP: NORMAL MMHG
BH CV STRESS RECOVERY HR: 96 BPM
BH CV STRESS RECOVERY O2: 98 %
BH CV STRESS STAGE 1: 1
BH CV STRESS STAGE 2: 2
BH CV THREE MINUTE POST TECH DATA BLOOD PRESSURE: NORMAL MMHG
BH CV THREE MINUTE POST TECH DATA HEART RATE: 100 BPM
BH CV THREE MINUTE POST TECH DATA OXYGEN SATURATION: 99 %
LV EF NUC BP: 90 %
MAXIMAL PREDICTED HEART RATE: 146 BPM
PERCENT MAX PREDICTED HR: 84.93 %
STRESS BASELINE BP: NORMAL MMHG
STRESS BASELINE HR: 75 BPM
STRESS O2 SAT REST: 99 %
STRESS PERCENT HR: 100 %
STRESS POST ESTIMATED WORKLOAD: 4.8 METS
STRESS POST EXERCISE DUR MIN: 9 MIN
STRESS POST EXERCISE DUR SEC: 2 SEC
STRESS POST O2 SAT PEAK: 100 %
STRESS POST PEAK BP: NORMAL MMHG
STRESS POST PEAK HR: 124 BPM
STRESS TARGET HR: 124 BPM

## 2024-06-05 NOTE — TELEPHONE ENCOUNTER
Pt aware of results. Verbalized understanding. Pt stated that she is still having chest pain and was asking what she could do about it. Please advice

## 2024-06-24 ENCOUNTER — OFFICE VISIT (OUTPATIENT)
Dept: CARDIOLOGY | Facility: CLINIC | Age: 74
End: 2024-06-24
Payer: MEDICARE

## 2024-06-24 ENCOUNTER — TELEPHONE (OUTPATIENT)
Dept: FAMILY MEDICINE CLINIC | Facility: CLINIC | Age: 74
End: 2024-06-24
Payer: MEDICARE

## 2024-06-24 VITALS
HEART RATE: 67 BPM | HEIGHT: 62 IN | BODY MASS INDEX: 20.09 KG/M2 | WEIGHT: 109.2 LBS | SYSTOLIC BLOOD PRESSURE: 129 MMHG | DIASTOLIC BLOOD PRESSURE: 84 MMHG

## 2024-06-24 DIAGNOSIS — Z72.0 TOBACCO ABUSE: ICD-10-CM

## 2024-06-24 DIAGNOSIS — R07.9 CHEST PAIN, UNSPECIFIED TYPE: Primary | ICD-10-CM

## 2024-06-24 DIAGNOSIS — E78.2 MIXED HYPERLIPIDEMIA: ICD-10-CM

## 2024-06-24 PROCEDURE — G2211 COMPLEX E/M VISIT ADD ON: HCPCS

## 2024-06-24 PROCEDURE — 99214 OFFICE O/P EST MOD 30 MIN: CPT

## 2024-06-24 PROCEDURE — 1160F RVW MEDS BY RX/DR IN RCRD: CPT

## 2024-06-24 PROCEDURE — 1159F MED LIST DOCD IN RCRD: CPT

## 2024-06-24 RX ORDER — PANTOPRAZOLE SODIUM 40 MG/1
40 TABLET, DELAYED RELEASE ORAL DAILY
Qty: 30 TABLET | Refills: 3 | Status: SHIPPED | OUTPATIENT
Start: 2024-06-24

## 2024-06-24 NOTE — PROGRESS NOTES
Chief Complaint  Hypertension, Hyperlipidemia, and Follow-up (2 mo f/u.  Stress test is complete.  Pt discontinued fosamax due to chest pain.  Pain has gotten much better. )    Subjective        History of Present Illness  Samira Kramer presents to [facility] CARDIOLOGY for follow up.   History of Present Illness    Patient is a 73-year-old female with past medical history outlined below, significant for hyperlipidemia and coronary artery calcification who presents for follow-up on recent testing.  She was having episodes of chest discomfort that started in her abdomen and radiated up to her chest but reports significant improvement in these since discontinuing Fosamax.  She denies any further episodes of chest pain or discomfort.  She has no dyspnea, orthopnea, edema, palpitations or syncope.    Past Medical History:   Diagnosis Date    Gastro-esophageal reflux disease without esophagitis     Humerus fracture     RIGHT PROXIMAL    Hyperlipidemia, unspecified     Hypothyroidism, unspecified     Kidney stone     NO CURRENT ISSUES    Macular degeneration 2021    Smoker     Vitamin D deficiency, unspecified        ALLERGY  Allergies   Allergen Reactions    Fenofibrate Hives    Aleve [Naproxen] GI Intolerance    Atorvastatin Myalgia    Nsaids GI Intolerance    Omega 3-6-9 Fatty Acids Diarrhea    Penicillins Hives    Sulfa Antibiotics Rash    Zetia [Ezetimibe] Rash        Past Surgical History:   Procedure Laterality Date    APPENDECTOMY      COLONOSCOPY  07/2017, 12/2018    per Dr. Hernandez, normal, due in 2027    ORIF HUMERUS FRACTURE Right 4/18/2023    Procedure: HUMERUS PROXIMAL OPEN REDUCTION INTERNAL FIXATION;  Surgeon: Keshawn Arellano MD;  Location: [facility];  Service: Orthopedics;  Laterality: Right;    URETEROSCOPY  02/2018    SCOPE-BLADDER AND KIDNEYS        Social History     Socioeconomic History    Marital status:    Tobacco Use    Smoking status: Every Day     Current  packs/day: 1.00     Types: Cigarettes     Passive exposure: Never    Smokeless tobacco: Never    Tobacco comments:     INST PER ANESTHESIA PROTOCOL, smoked last yesterday   Vaping Use    Vaping status: Never Used   Substance and Sexual Activity    Alcohol use: Never    Drug use: Never    Sexual activity: Defer       Family History   Problem Relation Age of Onset    Cancer Mother         TYPE NOT SPECIFIED    Cancer Father         TYPE NOT SPECIFIED    Malig Hyperthermia Neg Hx         Current Outpatient Medications on File Prior to Visit   Medication Sig    Ascorbic Acid (Vitamin C) 500 MG/5ML liquid Daily. LAST DOSE 4/13/23    Calcium Polycarbophil (FIBER-CAPS PO) Take  by mouth. LAST DOSE 4/13/23    carbamide peroxide (Debrox) 6.5 % otic solution Administer 5 drops into both ears 2 (Two) Times a Day.    cholecalciferol (VITAMIN D3) 25 MCG (1000 UT) tablet Take 1 tablet by mouth Daily.    clobetasol propionate (TEMOVATE) 0.05 % cream APPLY CREAM TOPICALLY TWICE DAILY TO SORE ON EAR    desonide (DESOWEN) 0.05 % cream desonide 0.05 % topical cream apply sparingly and rub gently into the affected area(s) by topical route 2 times per day   Suspended    Diclofenac Sodium (VOLTAREN) 1 % gel gel Apply 4 g topically to the appropriate area as directed 4 (Four) Times a Day As Needed (joint pain).    fluticasone (FLONASE) 50 MCG/ACT nasal spray 2 sprays into the nostril(s) as directed by provider Daily. 2 sprays each nostril daily    levothyroxine (SYNTHROID, LEVOTHROID) 50 MCG tablet Take 1 tablet by mouth Every Morning.    multivitamin with minerals tablet tablet Take 2 tablets by mouth Daily. For eyes/macular degeneration  LAST DOSE 4/13/23    Omega-3 Fatty Acids (fish oil) 1000 MG capsule capsule Take  by mouth Daily With Breakfast.    rosuvastatin (Crestor) 5 MG tablet Take 1 tablet by mouth Daily.    traZODone (DESYREL) 50 MG tablet Take 2 tablets by mouth Every Night.    vitamin B-12 (CYANOCOBALAMIN) 100 MCG tablet  "LAST DOSE 4/13/23    Zinc 10 MG lozenge LAST DOSE 4/13/23    alendronate (Fosamax) 70 MG tablet Take 1 tablet by mouth Every 7 (Seven) Days. (Patient not taking: Reported on 6/24/2024)    [DISCONTINUED] clindamycin (Cleocin) 300 MG capsule Take 1 capsule by mouth 3 (Three) Times a Day. (Patient not taking: Reported on 6/24/2024)     No current facility-administered medications on file prior to visit.       Objective   Vitals:    06/24/24 0954   BP: 129/84   Pulse: 67   Weight: 49.5 kg (109 lb 3.2 oz)   Height: 157.5 cm (62\")       Physical Exam  Constitutional:       General: She is awake. She is not in acute distress.     Appearance: Normal appearance.   HENT:      Head: Normocephalic.      Nose: Nose normal. No congestion.   Eyes:      Extraocular Movements: Extraocular movements intact.      Conjunctiva/sclera: Conjunctivae normal.      Pupils: Pupils are equal, round, and reactive to light.   Neck:      Thyroid: No thyromegaly.      Vascular: No JVD.   Cardiovascular:      Rate and Rhythm: Normal rate and regular rhythm.      Chest Wall: PMI is not displaced.      Pulses: Normal pulses.      Heart sounds: Normal heart sounds, S1 normal and S2 normal. No murmur heard.     No friction rub. No gallop. No S3 or S4 sounds.   Pulmonary:      Effort: Pulmonary effort is normal.      Breath sounds: Normal breath sounds. No wheezing, rhonchi or rales.   Abdominal:      General: Bowel sounds are normal.      Palpations: Abdomen is soft.      Tenderness: There is no abdominal tenderness.   Musculoskeletal:      Cervical back: No tenderness.      Right lower leg: No edema.      Left lower leg: No edema.   Lymphadenopathy:      Cervical: No cervical adenopathy.   Skin:     General: Skin is warm and dry.      Capillary Refill: Capillary refill takes less than 2 seconds.      Coloration: Skin is not cyanotic.      Findings: No petechiae or rash.      Nails: There is no clubbing.   Neurological:      Mental Status: She is " alert.   Psychiatric:         Mood and Affect: Mood normal.         Behavior: Behavior is cooperative.           Result Review     The following data was reviewed by MICHELLE Silva on 06/24/24.      CMP          12/21/2023    10:28 3/27/2024    10:22 5/9/2024    09:28   CMP   Glucose 94  84  92    BUN 12  14  13    Creatinine 1.00  1.06  0.95    EGFR 59.2  55.2  63.0    Sodium 140  141  140    Potassium 4.1  4.5  4.2    Chloride 104  105  102    Calcium 9.7  9.7  9.9    Total Protein 7.1  7.0  7.1    Albumin 4.3  4.0  4.0    Globulin 2.8  3.0  3.1    Total Bilirubin 0.3  0.3  0.3    Alkaline Phosphatase 105  92  107    AST (SGOT) 25  22  22    ALT (SGPT) 21  15  15    Albumin/Globulin Ratio 1.5  1.3  1.3    BUN/Creatinine Ratio 12.0  13.2  13.7    Anion Gap 10.0  9.0  10.9      CBC w/diff          3/27/2024    10:22   CBC w/Diff   WBC 5.44    RBC 4.76    Hemoglobin 16.7    Hematocrit 47.6    .0    MCH 35.1    MCHC 35.1    RDW 12.5    Platelets 160    Neutrophil Rel % 59.8    Immature Granulocyte Rel % 0.6    Lymphocyte Rel % 29.8    Monocyte Rel % 6.8    Eosinophil Rel % 2.4    Basophil Rel % 0.6       Lipid Panel          7/11/2023    13:53 12/21/2023    10:28 3/27/2024    10:22   Lipid Panel   Total Cholesterol 264  279  247    Triglycerides 242  201  185    HDL Cholesterol 39  41  39    VLDL Cholesterol 46  39  35    LDL Cholesterol  179  199  173    LDL/HDL Ratio 4.53  4.82  4.38          Results for orders placed during the hospital encounter of 06/04/24    Stress Test With Myocardial Perfusion One Day    Interpretation Summary    Left ventricular ejection fraction is hyperdynamic (Calculated EF > 70%).    Patient walked 9 minutes and 2 seconds on modified Ilia protocol achieving 4.82 metabolic equivalents.  The patient was then given Lexiscan.  She did get to 84% maximum predicted heart rate.    Stress electrocardiogram showed no ischemia.    Cardia perfusion images show a fixed anteroapical  defect.  No obvious reversible perfusion defect greater than 20% was noted.    Feel this represents a normal Miranda-walk Cardiolite with no evidence of ischemia.    No results found for this or any previous visit.          Procedures    Assessment & Plan  Diagnoses and all orders for this visit:    1. Chest pain, unspecified type (Primary)    2. Tobacco abuse    3. Mixed hyperlipidemia    Other orders  -     pantoprazole (Protonix) 40 MG EC tablet; Take 1 tablet by mouth Daily.  Dispense: 30 tablet; Refill: 3      Assessment & Plan        1.  Patient's chest pain has resolved since discontinuing Fosamax.  Recent stress test was low risk.  She was reassured.  She will be prescribed Protonix 40 mg for what sounded like esophageal spasms see if this further improves her abdominal discomfort.  2.  Tobacco cessation is advised.  3.  Patient has been intolerant of statins in the past but is doing well with the Crestor.  Continue the same.        The medical services provided during this encounter are part of ongoing care related to this patient's single serious condition or complex condition.      Follow Up   Return in about 3 months (around 9/24/2024) for With MICHELLE Lofton.    Patient was given instructions and counseling regarding her condition or for health maintenance advice. Please see specific information pulled into the AVS if appropriate.         MICHELLE Silva  06/24/24  10:03 EDT    Dictated Utilizing Dragon Dictation

## 2024-06-24 NOTE — TELEPHONE ENCOUNTER
PATIENT CAME IN AND WANTED TO LET YOU KNOW THAT THE CARDIOLOGIST GAVE HER A PROTONIX. PATIENT HAS QUIT TAKING ALENDRONATE. SHE IS WANTING TO KNOW IF THERE IS SOMETHING ELSE SHE COULD TAKE INSTEAD. PLEASE GIVE PATIENT A CALL TO LET HER KNOW.

## 2024-06-26 ENCOUNTER — OFFICE VISIT (OUTPATIENT)
Dept: FAMILY MEDICINE CLINIC | Facility: CLINIC | Age: 74
End: 2024-06-26
Payer: MEDICARE

## 2024-06-26 VITALS
HEART RATE: 63 BPM | BODY MASS INDEX: 19.51 KG/M2 | OXYGEN SATURATION: 99 % | TEMPERATURE: 97.7 F | HEIGHT: 62 IN | SYSTOLIC BLOOD PRESSURE: 119 MMHG | WEIGHT: 106 LBS | DIASTOLIC BLOOD PRESSURE: 70 MMHG

## 2024-06-26 DIAGNOSIS — E78.2 MIXED HYPERLIPIDEMIA: ICD-10-CM

## 2024-06-26 DIAGNOSIS — K21.9 GASTRO-ESOPHAGEAL REFLUX DISEASE WITHOUT ESOPHAGITIS: ICD-10-CM

## 2024-06-26 DIAGNOSIS — E03.9 ACQUIRED HYPOTHYROIDISM: ICD-10-CM

## 2024-06-26 DIAGNOSIS — M81.0 OSTEOPOROSIS, UNSPECIFIED OSTEOPOROSIS TYPE, UNSPECIFIED PATHOLOGICAL FRACTURE PRESENCE: Primary | ICD-10-CM

## 2024-06-26 DIAGNOSIS — R07.9 CHEST PAIN, UNSPECIFIED TYPE: ICD-10-CM

## 2024-06-26 LAB
ALBUMIN SERPL-MCNC: 3.9 G/DL (ref 3.5–5.2)
ALBUMIN/GLOB SERPL: 1.3 G/DL
ALP SERPL-CCNC: 102 U/L (ref 39–117)
ALT SERPL W P-5'-P-CCNC: 13 U/L (ref 1–33)
ANION GAP SERPL CALCULATED.3IONS-SCNC: 10.8 MMOL/L (ref 5–15)
AST SERPL-CCNC: 24 U/L (ref 1–32)
BILIRUB SERPL-MCNC: 0.2 MG/DL (ref 0–1.2)
BUN SERPL-MCNC: 16 MG/DL (ref 8–23)
BUN/CREAT SERPL: 17.2 (ref 7–25)
CALCIUM SPEC-SCNC: 9.5 MG/DL (ref 8.6–10.5)
CHLORIDE SERPL-SCNC: 105 MMOL/L (ref 98–107)
CHOLEST SERPL-MCNC: 142 MG/DL (ref 0–200)
CO2 SERPL-SCNC: 25.2 MMOL/L (ref 22–29)
CREAT SERPL-MCNC: 0.93 MG/DL (ref 0.57–1)
EGFRCR SERPLBLD CKD-EPI 2021: 64.6 ML/MIN/1.73
GLOBULIN UR ELPH-MCNC: 3.1 GM/DL
GLUCOSE SERPL-MCNC: 105 MG/DL (ref 65–99)
HDLC SERPL-MCNC: 39 MG/DL (ref 40–60)
LDLC SERPL CALC-MCNC: 71 MG/DL (ref 0–100)
LDLC/HDLC SERPL: 1.68 {RATIO}
POTASSIUM SERPL-SCNC: 3.9 MMOL/L (ref 3.5–5.2)
PROT SERPL-MCNC: 7 G/DL (ref 6–8.5)
SODIUM SERPL-SCNC: 141 MMOL/L (ref 136–145)
T4 FREE SERPL-MCNC: 1.23 NG/DL (ref 0.92–1.68)
TRIGL SERPL-MCNC: 188 MG/DL (ref 0–150)
TSH SERPL DL<=0.05 MIU/L-ACNC: 1.18 UIU/ML (ref 0.27–4.2)
VLDLC SERPL-MCNC: 32 MG/DL (ref 5–40)

## 2024-06-26 PROCEDURE — 99214 OFFICE O/P EST MOD 30 MIN: CPT | Performed by: NURSE PRACTITIONER

## 2024-06-26 PROCEDURE — 80053 COMPREHEN METABOLIC PANEL: CPT | Performed by: NURSE PRACTITIONER

## 2024-06-26 PROCEDURE — 84439 ASSAY OF FREE THYROXINE: CPT | Performed by: NURSE PRACTITIONER

## 2024-06-26 PROCEDURE — 80061 LIPID PANEL: CPT | Performed by: NURSE PRACTITIONER

## 2024-06-26 PROCEDURE — 84443 ASSAY THYROID STIM HORMONE: CPT | Performed by: NURSE PRACTITIONER

## 2024-06-26 PROCEDURE — 36415 COLL VENOUS BLD VENIPUNCTURE: CPT | Performed by: NURSE PRACTITIONER

## 2024-06-26 PROCEDURE — 1125F AMNT PAIN NOTED PAIN PRSNT: CPT | Performed by: NURSE PRACTITIONER

## 2024-06-26 NOTE — PROGRESS NOTES
ACUTE VISIT     Patient Name: Samira Kramer  : 1950   MRN: [id number]     Chief Complaint:    Chief Complaint   Patient presents with    Heartburn       History of Present Illness: Samira Kramer is a 73 y.o. female who is here today for GERD.    Per patient- CARDIOLOGIST GAVE HER A PROTONIX. PATIENT HAS QUIT TAKING ALENDRONATE. SHE IS WANTING TO KNOW IF THERE IS SOMETHING ELSE SHE COULD TAKE INSTEAD.     BONE DENSITY  Osteoporosis    on 2023 at 11:48               Currently taking Protonix 40 mg daily, taken 2 doses gerd improved did not suffer from reflux last night     Follow up cardiology consult per progress note 24  Lisa Lopez APRN   1. Chest pain, unspecified type (Primary)     2. Tobacco abuse     3. Mixed hyperlipidemia     Other orders  -     pantoprazole (Protonix) 40 MG EC tablet; Take 1 tablet by mouth Daily.  Dispense: 30 tablet; Refill: 3      1.  Patient's chest pain has resolved since discontinuing Fosamax.  Recent stress test was low risk.  She was reassured.  She will be prescribed Protonix 40 mg for what sounded like esophageal spasms see if this further improves her abdominal discomfort.  2.  Tobacco cessation is advised.  3.  Patient has been intolerant of statins in the past but is doing well with the Crestor.  Continue the same.    Also reports she started taking the crestor as recommend nightly, tolerating well     Also c/o hair loss concerned her thyroid levels may be off         Subjective      Review of Systems:   Review of Systems   Constitutional:  Negative for fever.   Respiratory:  Negative for cough and shortness of breath.    Cardiovascular:  Negative for chest pain.   Gastrointestinal:  Negative for abdominal pain.        Past Medical History:   Past Medical History:   Diagnosis Date    Gastro-esophageal reflux disease without esophagitis     Humerus fracture     RIGHT PROXIMAL    Hyperlipidemia, unspecified     Hypothyroidism,  unspecified     Kidney stone     NO CURRENT ISSUES    Macular degeneration 2021    Smoker     Vitamin D deficiency, unspecified        Past Surgical History:   Past Surgical History:   Procedure Laterality Date    APPENDECTOMY      COLONOSCOPY  07/2017, 12/2018    per Dr. Hernandez normal, due in 2027    ORIF HUMERUS FRACTURE Right 4/18/2023    Procedure: HUMERUS PROXIMAL OPEN REDUCTION INTERNAL FIXATION;  Surgeon: Keshawn Arellano MD;  Location: [facility] MAIN OR;  Service: Orthopedics;  Laterality: Right;    URETEROSCOPY  02/2018    SCOPE-BLADDER AND KIDNEYS       Family History:   Family History   Problem Relation Age of Onset    Cancer Mother         TYPE NOT SPECIFIED    Cancer Father         TYPE NOT SPECIFIED    Malig Hyperthermia Neg Hx        Social History:   Social History     Socioeconomic History    Marital status:    Tobacco Use    Smoking status: Every Day     Current packs/day: 1.00     Types: Cigarettes     Passive exposure: Never    Smokeless tobacco: Never    Tobacco comments:     INST PER ANESTHESIA PROTOCOL, smoked last yesterday   Vaping Use    Vaping status: Never Used   Substance and Sexual Activity    Alcohol use: Never    Drug use: Never    Sexual activity: Defer       Medications:     Current Outpatient Medications:     Ascorbic Acid (Vitamin C) 500 MG/5ML liquid, Daily. LAST DOSE 4/13/23, Disp: , Rfl:     Calcium Polycarbophil (FIBER-CAPS PO), Take  by mouth. LAST DOSE 4/13/23, Disp: , Rfl:     carbamide peroxide (Debrox) 6.5 % otic solution, Administer 5 drops into both ears 2 (Two) Times a Day., Disp: 15 mL, Rfl: 2    cholecalciferol (VITAMIN D3) 25 MCG (1000 UT) tablet, Take 1 tablet by mouth Daily., Disp: , Rfl:     clobetasol propionate (TEMOVATE) 0.05 % cream, APPLY CREAM TOPICALLY TWICE DAILY TO SORE ON EAR, Disp: , Rfl:     desonide (DESOWEN) 0.05 % cream, desonide 0.05 % topical cream apply sparingly and rub gently into the affected area(s) by topical route 2 times per day    "Suspended, Disp: , Rfl:     Diclofenac Sodium (VOLTAREN) 1 % gel gel, Apply 4 g topically to the appropriate area as directed 4 (Four) Times a Day As Needed (joint pain)., Disp: 350 g, Rfl: 1    fluticasone (FLONASE) 50 MCG/ACT nasal spray, 2 sprays into the nostril(s) as directed by provider Daily. 2 sprays each nostril daily, Disp: 18.2 mL, Rfl: 1    levothyroxine (SYNTHROID, LEVOTHROID) 50 MCG tablet, Take 1 tablet by mouth Every Morning., Disp: 90 tablet, Rfl: 1    multivitamin with minerals tablet tablet, Take 2 tablets by mouth Daily. For eyes/macular degeneration LAST DOSE 4/13/23, Disp: , Rfl:     Omega-3 Fatty Acids (fish oil) 1000 MG capsule capsule, Take  by mouth Daily With Breakfast., Disp: , Rfl:     pantoprazole (Protonix) 40 MG EC tablet, Take 1 tablet by mouth Daily., Disp: 30 tablet, Rfl: 3    rosuvastatin (Crestor) 5 MG tablet, Take 1 tablet by mouth Daily., Disp: 90 tablet, Rfl: 0    traZODone (DESYREL) 50 MG tablet, Take 2 tablets by mouth Every Night., Disp: 180 tablet, Rfl: 1    vitamin B-12 (CYANOCOBALAMIN) 100 MCG tablet, LAST DOSE 4/13/23, Disp: , Rfl:     Zinc 10 MG lozenge, LAST DOSE 4/13/23, Disp: , Rfl:     alendronate (Fosamax) 70 MG tablet, Take 1 tablet by mouth Every 7 (Seven) Days. (Patient not taking: Reported on 6/24/2024), Disp: 13 tablet, Rfl: 3    Allergies:   Allergies   Allergen Reactions    Fenofibrate Hives    Aleve [Naproxen] GI Intolerance    Atorvastatin Myalgia    Nsaids GI Intolerance    Omega 3-6-9 Fatty Acids Diarrhea    Penicillins Hives    Sulfa Antibiotics Rash    Zetia [Ezetimibe] Rash         Objective     Physical Exam:  Vital Signs:   Vitals:    06/26/24 1422   BP: 119/70   Pulse: 63   Temp: 97.7 °F (36.5 °C)   SpO2: 99%   Weight: 48.1 kg (106 lb)   Height: 157.5 cm (62\")     Body mass index is 19.39 kg/m².     Physical Exam  Cardiovascular:      Rate and Rhythm: Normal rate and regular rhythm.      Heart sounds: Normal heart sounds. No murmur " heard.  Pulmonary:      Effort: Pulmonary effort is normal.      Breath sounds: Normal breath sounds.   Abdominal:      General: Bowel sounds are normal.      Palpations: Abdomen is soft.   Skin:     General: Skin is warm and dry.   Neurological:      Mental Status: She is alert.             Assessment / Plan      Assessment/Plan:   Diagnoses and all orders for this visit:    1. Osteoporosis, unspecified osteoporosis type, unspecified pathological fracture presence (Primary)    2. Chest pain, unspecified type    3. Gastro-esophageal reflux disease without esophagitis    4. Mixed hyperlipidemia  -     Comprehensive Metabolic Panel  -     Lipid Panel    5. Acquired hypothyroidism  -     TSH  -     T4, Free       Osteoporosis reviewed most recent bone density patient declines to try further treatment at this time is going to take calcium plus vitamin D and states she is active with weightbearing exercises daily  Chest pain has resolved reviewed cardiology note with patient  Reflux currently controlled with Protonix only after 2 doses did discuss changes in diet  Hyperlipidemia will obtain lipid panel and CMP to monitor current statin dose Crestor 5 mg at nighttime denies myalgias patient report tolerating well  Hypothyroidism will obtain labs to monitor current Synthroid dose 50 mcg daily denies palpitations does complain of hair loss        Follow Up:   Return in about 3 months (around 10/1/2024).    MICHELLE Donaldson      Please note that portions of this note were completed with a voice recognition program.

## 2024-07-22 DIAGNOSIS — E03.9 ACQUIRED HYPOTHYROIDISM: ICD-10-CM

## 2024-07-22 RX ORDER — LEVOTHYROXINE SODIUM 0.05 MG/1
50 TABLET ORAL EVERY MORNING
Qty: 90 TABLET | Refills: 0 | Status: SHIPPED | OUTPATIENT
Start: 2024-07-22

## 2024-07-24 RX ORDER — ROSUVASTATIN CALCIUM 5 MG/1
5 TABLET, COATED ORAL DAILY
Qty: 90 TABLET | Refills: 0 | Status: SHIPPED | OUTPATIENT
Start: 2024-07-24

## 2024-09-12 ENCOUNTER — OFFICE VISIT (OUTPATIENT)
Dept: OTOLARYNGOLOGY | Facility: CLINIC | Age: 74
End: 2024-09-12
Payer: MEDICARE

## 2024-09-12 VITALS — WEIGHT: 108.4 LBS | TEMPERATURE: 97.5 F | BODY MASS INDEX: 19.95 KG/M2 | HEIGHT: 62 IN

## 2024-09-12 DIAGNOSIS — K11.8 PAROTID MASS: Primary | ICD-10-CM

## 2024-09-12 DIAGNOSIS — H61.23 HEARING LOSS DUE TO CERUMEN IMPACTION, BILATERAL: ICD-10-CM

## 2024-09-12 DIAGNOSIS — H61.303 EXTERNAL EAR CANAL STENOSIS, ACQUIRED, BILATERAL: ICD-10-CM

## 2024-09-12 NOTE — PATIENT INSTRUCTIONS
1.  Patient had bilateral extensive cerumen impactions cleaned out of both ears otherwise bilateral TMs are unremarkable.  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  2.  I would suggest the patient get her ears cleaned maybe once every 6 months.  3.  Patient has bilateral parotid masses but unfortunately none of them are palpable despite the fact that they showed up on the CT scan.  I would recommend conservative follow-up and I will examine again in 6 months.  When it is definitely palpable that is when I would recommend either biopsy or surgical intervention.  4.  I will see patient in 6 months for follow-up.

## 2024-09-12 NOTE — PROGRESS NOTES
Patient Name: Samira Kramer   Visit Date: 09/12/2024   Patient ID: [id number]  Provider: Vlad Rodriguez MD    Sex: female  Location: [facility]   YOB: 1950  Location Address: [address] [place] [address], [address] [place] [address],?[place]?[id number]    Primary Care Provider Jason Dunlap APRN  Location Phone: [phone]    Referring Provider: Jason Dunlap*        Chief Complaint  Parotid Mass    History of Present Illness  Samira Kramer is a 73 y.o. female who presents to [facility] for Parotid Mass.  Patient had a screening CT of chest performed on 12/21/2023 showing 9 mm pleural-based nodule.  Subsequently she underwent PET/CT on 1/22/2024 showing 1.2 cm x 0.8 cm left parotid soft tissue mass and also a 0.6 cm x 0.5 cm mass that is well-circumscribed in the right parotid gland as well.  There were both hypermetabolic and suspected that it might represent Warthin's tumor.  9 mm pleural-based nodule in the right medial right lower lobe was less nodular and was not hypermetabolic.  Follow-up CT of chest on 4/1/2024 showed millimeter pleural-based right lower lobe nodule has decreased in size to 7 mm consistent with benign pathology.  However CT of facial bones showed bilateral parotid masses there is 7 mm and 1.1 cm and persistent suspecting Warthin's tumor or possibly intraparotid nodes.  Past Medical History:   Diagnosis Date    Gastro-esophageal reflux disease without esophagitis     Humerus fracture     RIGHT PROXIMAL    Hyperlipidemia, unspecified     Hypothyroidism, unspecified     Kidney stone     NO CURRENT ISSUES    Macular degeneration 2021    Smoker     Vitamin D deficiency, unspecified        Past Surgical History:   Procedure Laterality Date    APPENDECTOMY      COLONOSCOPY  07/2017, 12/2018    per Dr. Hernandez, normal, due in 2027    ORIF HUMERUS FRACTURE Right 4/18/2023    Procedure: HUMERUS PROXIMAL OPEN  REDUCTION INTERNAL FIXATION;  Surgeon: Keshawn Arellano MD;  Location: [facility] MAIN OR;  Service: Orthopedics;  Laterality: Right;    URETEROSCOPY  02/2018    SCOPE-BLADDER AND KIDNEYS         Current Outpatient Medications:     Ascorbic Acid (Vitamin C) 500 MG/5ML liquid, Daily. LAST DOSE 4/13/23, Disp: , Rfl:     Calcium Polycarbophil (FIBER-CAPS PO), Take  by mouth. LAST DOSE 4/13/23, Disp: , Rfl:     cholecalciferol (VITAMIN D3) 25 MCG (1000 UT) tablet, Take 1 tablet by mouth Daily., Disp: , Rfl:     clobetasol propionate (TEMOVATE) 0.05 % cream, APPLY CREAM TOPICALLY TWICE DAILY TO SORE ON EAR, Disp: , Rfl:     levothyroxine (SYNTHROID, LEVOTHROID) 50 MCG tablet, TAKE 1 TABLET BY MOUTH ONCE DAILY IN THE MORNING, Disp: 90 tablet, Rfl: 0    multivitamin with minerals tablet tablet, Take 2 tablets by mouth Daily. For eyes/macular degeneration LAST DOSE 4/13/23, Disp: , Rfl:     pantoprazole (Protonix) 40 MG EC tablet, Take 1 tablet by mouth Daily., Disp: 30 tablet, Rfl: 3    rosuvastatin (CRESTOR) 5 MG tablet, Take 1 tablet by mouth once daily, Disp: 90 tablet, Rfl: 0    traZODone (DESYREL) 50 MG tablet, Take 2 tablets by mouth Every Night., Disp: 180 tablet, Rfl: 1    vitamin B-12 (CYANOCOBALAMIN) 100 MCG tablet, LAST DOSE 4/13/23, Disp: , Rfl:     Zinc 10 MG lozenge, LAST DOSE 4/13/23, Disp: , Rfl:     alendronate (Fosamax) 70 MG tablet, Take 1 tablet by mouth Every 7 (Seven) Days. (Patient not taking: Reported on 6/24/2024), Disp: 13 tablet, Rfl: 3    carbamide peroxide (Debrox) 6.5 % otic solution, Administer 5 drops into both ears 2 (Two) Times a Day. (Patient not taking: Reported on 9/12/2024), Disp: 15 mL, Rfl: 2    desonide (DESOWEN) 0.05 % cream, desonide 0.05 % topical cream apply sparingly and rub gently into the affected area(s) by topical route 2 times per day   Suspended (Patient not taking: Reported on 9/12/2024), Disp: , Rfl:     Diclofenac Sodium (VOLTAREN) 1 % gel gel, Apply 4 g topically to the  "appropriate area as directed 4 (Four) Times a Day As Needed (joint pain). (Patient not taking: Reported on 9/12/2024), Disp: 350 g, Rfl: 1    fluticasone (FLONASE) 50 MCG/ACT nasal spray, 2 sprays into the nostril(s) as directed by provider Daily. 2 sprays each nostril daily (Patient not taking: Reported on 9/12/2024), Disp: 18.2 mL, Rfl: 1    Omega-3 Fatty Acids (fish oil) 1000 MG capsule capsule, Take  by mouth Daily With Breakfast. (Patient not taking: Reported on 9/12/2024), Disp: , Rfl:      Allergies   Allergen Reactions    Fenofibrate Hives    Aleve [Naproxen] GI Intolerance    Atorvastatin Myalgia    Nsaids GI Intolerance    Omega 3-6-9 Fatty Acids Diarrhea    Penicillins Hives    Sulfa Antibiotics Rash    Zetia [Ezetimibe] Rash       Social History     Tobacco Use    Smoking status: Every Day     Current packs/day: 1.00     Types: Cigarettes     Passive exposure: Never    Smokeless tobacco: Never    Tobacco comments:     INST PER ANESTHESIA PROTOCOL, smoked last yesterday   Vaping Use    Vaping status: Never Used   Substance Use Topics    Alcohol use: Never    Drug use: Never        Objective     Vital Signs:   Vitals:    09/12/24 1447   Temp: 97.5 °F (36.4 °C)   TempSrc: Temporal   Weight: 49.2 kg (108 lb 6.4 oz)   Height: 157.5 cm (62\")       Tobacco Use: High Risk (9/12/2024)    Patient History     Smoking Tobacco Use: Every Day     Smokeless Tobacco Use: Never     Passive Exposure: Never         Physical Exam    Constitutional   Appearance  well developed, well-nourished, alert and in no acute distress, voice clear and strong    Head   Inspection  no deformities or lesions      Face   Inspection  no facial lesions; House-Brackmann I/VI bilaterally   Palpation  no TMJ crepitus nor  muscle tenderness bilaterally     Eyes/Vision   Visual Fields  extraocular movements are intact, no spontaneous or gaze-induced nystagmus  Conjunctivae  clear   Sclerae  clear   Pupils and Irises  pupils equal, " round, and reactive to light.   Nystagmus  not present     Ears, Nose, Mouth and Throat  Ears  External Ears  appearance within normal limits, no lesions present   Otoscopic Examination  Bilateral cerumen impactions  Hearing  intact to conversational voice both ears   Tunning fork testing    Rinne:  Loyd:    Nose  External Nose  appearance normal   Intranasal Exam  mucosa within normal limits, vestibules normal, no intranasal lesions present, septum midline, sinuses non tender to percussion   Modified Brazos Test:    Oral Cavity  Oral Mucosa  oral mucosa normal without pallor or cyanosis   Lips  lip appearance normal   Teeth  normal dentition for age   Gums  gums pink, non-swollen, no bleeding present   Tongue  tongue appearance normal; normal mobility   Palate  hard palate normal, soft palate appearance normal with symmetric mobility     Throat  Oropharynx  no inflammation or lesions present, tonsils within normal limits   Hypopharynx  appearance within normal limits   Larynx  voice normal     Neck  Inspection/Palpation  Unable to palpate any parotid mass.    Lymphatic  Neck  no lymphadenopathy present   Supraclavicular Nodes  no lymphadenopathy present   Preauricular Nodes  no lymphadenopathy present     Respiratory  Respiratory Effort  breathing unlabored   Inspection of Chest  normal appearance, no retractions     Musculoskeletal   Cervical back: Normal range of motion and neck supple.      Skin and Subcutaneous Tissue  General Inspection  Regarding face and neck - there are no rashes present, no lesions present, and no areas of discoloration     Neurologic  Cranial Nerves  cranial nerves II-XII are grossly intact bilaterally   Gait and Station  normal gait, able to stand without diffculty    Psychiatric  Judgement and Insight  judgment and insight intact   Mood and Affect  mood normal, affect appropriate       RESULTS REVIEWED    I have reviewed the following information:   [x]  Previous Internal Note  []   Previous External Note:   [x]  Ordered Tests & Results:      Pathology:      TSH   Date Value Ref Range Status   06/26/2024 1.180 0.270 - 4.200 uIU/mL Final     T3, Free   Date Value Ref Range Status   08/23/2022 2.79 2.00 - 4.40 pg/mL Final     Free T4   Date Value Ref Range Status   06/26/2024 1.23 0.92 - 1.68 ng/dL Final     Calcium   Date Value Ref Range Status   06/26/2024 9.5 8.6 - 10.5 mg/dL Final     25 Hydroxy, Vitamin D   Date Value Ref Range Status   07/11/2023 55.9 30.0 - 100.0 ng/ml Final       No Images in the past 120 days found..    I have discussed the interpretation of the above results with the patient.    Ear Cerumen Removal    Date/Time: 9/12/2024 3:45 PM    Performed by: Vlad Rodriguez MD  Authorized by: Vlad Rodriguez MD    Anesthesia:  Local Anesthetic: none  Location details: left ear and right ear  Patient tolerance: patient tolerated the procedure well with no immediate complications  Comments: Under the microscope extensive bilateral cerumen impactions have been cleaned out of both the ears.  Bilateral tympanic membranes are unremarkable however her ear canal is stenotic and also very narrow due to cartilaginous collapse at the introitus of the canal.  Procedure type: instrumentation, alligator forceps, suction   Sedation:  Patient sedated: no                  Assessment and Plan   Diagnoses and all orders for this visit:    1. Parotid mass (Primary)    2. Hearing loss due to cerumen impaction, bilateral  -     Ear Cerumen Removal    3. External ear canal stenosis, acquired, bilateral        (K11.8) Parotid mass    (H61.23) Hearing loss due to cerumen impaction, bilateral - Plan: Ear Cerumen Removal    (H61.303) External ear canal stenosis, acquired, bilateral     Samira Nithya Kramer  reports that she has been smoking cigarettes. She has never been exposed to tobacco smoke. She has never used smokeless tobacco.     Plan:  Patient Instructions   1.  Patient had bilateral extensive  cerumen impactions cleaned out of both ears otherwise bilateral TMs are unremarkable.  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  2.  I would suggest the patient get her ears cleaned maybe once every 6 months.  3.  Patient has bilateral parotid masses but unfortunately none of them are palpable despite the fact that they showed up on the CT scan.  I would recommend conservative follow-up and I will examine again in 6 months.  When it is definitely palpable that is when I would recommend either biopsy or surgical intervention.  4.  I will see patient in 6 months for follow-up.      Follow Up   Return in about 6 months (around 3/12/2025), or From 1 year follow-up for ear cleaning and examination of parotid masses..  Patient was given instructions and counseling regarding her condition or for health maintenance advice. Please see specific information pulled into the AVS if appropriate.

## 2024-09-24 ENCOUNTER — OFFICE VISIT (OUTPATIENT)
Dept: CARDIOLOGY | Facility: CLINIC | Age: 74
End: 2024-09-24
Payer: MEDICARE

## 2024-09-24 ENCOUNTER — LAB (OUTPATIENT)
Dept: LAB | Facility: HOSPITAL | Age: 74
End: 2024-09-24
Payer: MEDICARE

## 2024-09-24 VITALS
WEIGHT: 108.4 LBS | BODY MASS INDEX: 19.95 KG/M2 | HEART RATE: 72 BPM | SYSTOLIC BLOOD PRESSURE: 135 MMHG | HEIGHT: 62 IN | DIASTOLIC BLOOD PRESSURE: 81 MMHG

## 2024-09-24 DIAGNOSIS — Z78.9 STATIN INTOLERANCE: ICD-10-CM

## 2024-09-24 DIAGNOSIS — E03.9 ACQUIRED HYPOTHYROIDISM: ICD-10-CM

## 2024-09-24 DIAGNOSIS — E03.9 ACQUIRED HYPOTHYROIDISM: Primary | ICD-10-CM

## 2024-09-24 DIAGNOSIS — Z72.0 TOBACCO ABUSE: ICD-10-CM

## 2024-09-24 DIAGNOSIS — E78.2 MIXED HYPERLIPIDEMIA: ICD-10-CM

## 2024-09-24 DIAGNOSIS — R07.9 CHEST PAIN, UNSPECIFIED TYPE: Primary | ICD-10-CM

## 2024-09-24 LAB
ALBUMIN SERPL-MCNC: 4.1 G/DL (ref 3.5–5.2)
ALBUMIN/GLOB SERPL: 1.1 G/DL
ALP SERPL-CCNC: 93 U/L (ref 39–117)
ALT SERPL W P-5'-P-CCNC: 19 U/L (ref 1–33)
ANION GAP SERPL CALCULATED.3IONS-SCNC: 9.6 MMOL/L (ref 5–15)
AST SERPL-CCNC: 34 U/L (ref 1–32)
BASOPHILS # BLD AUTO: 0.02 10*3/MM3 (ref 0–0.2)
BASOPHILS NFR BLD AUTO: 0.3 % (ref 0–1.5)
BILIRUB SERPL-MCNC: 0.3 MG/DL (ref 0–1.2)
BUN SERPL-MCNC: 16 MG/DL (ref 8–23)
BUN/CREAT SERPL: 15.5 (ref 7–25)
CALCIUM SPEC-SCNC: 10 MG/DL (ref 8.6–10.5)
CHLORIDE SERPL-SCNC: 104 MMOL/L (ref 98–107)
CHOLEST SERPL-MCNC: 145 MG/DL (ref 0–200)
CO2 SERPL-SCNC: 25.4 MMOL/L (ref 22–29)
CREAT SERPL-MCNC: 1.03 MG/DL (ref 0.57–1)
DEPRECATED RDW RBC AUTO: 48 FL (ref 37–54)
EGFRCR SERPLBLD CKD-EPI 2021: 57.2 ML/MIN/1.73
EOSINOPHIL # BLD AUTO: 0.07 10*3/MM3 (ref 0–0.4)
EOSINOPHIL NFR BLD AUTO: 1.2 % (ref 0.3–6.2)
ERYTHROCYTE [DISTWIDTH] IN BLOOD BY AUTOMATED COUNT: 13.1 % (ref 12.3–15.4)
GLOBULIN UR ELPH-MCNC: 3.7 GM/DL
GLUCOSE SERPL-MCNC: 88 MG/DL (ref 65–99)
HCT VFR BLD AUTO: 49 % (ref 34–46.6)
HDLC SERPL-MCNC: 34 MG/DL (ref 40–60)
HGB BLD-MCNC: 16.9 G/DL (ref 12–15.9)
IMM GRANULOCYTES # BLD AUTO: 0.01 10*3/MM3 (ref 0–0.05)
IMM GRANULOCYTES NFR BLD AUTO: 0.2 % (ref 0–0.5)
LDLC SERPL CALC-MCNC: 78 MG/DL (ref 0–100)
LDLC/HDLC SERPL: 2.13 {RATIO}
LYMPHOCYTES # BLD AUTO: 1.5 10*3/MM3 (ref 0.7–3.1)
LYMPHOCYTES NFR BLD AUTO: 25.1 % (ref 19.6–45.3)
MCH RBC QN AUTO: 34.1 PG (ref 26.6–33)
MCHC RBC AUTO-ENTMCNC: 34.5 G/DL (ref 31.5–35.7)
MCV RBC AUTO: 98.8 FL (ref 79–97)
MONOCYTES # BLD AUTO: 0.38 10*3/MM3 (ref 0.1–0.9)
MONOCYTES NFR BLD AUTO: 6.4 % (ref 5–12)
NEUTROPHILS NFR BLD AUTO: 4 10*3/MM3 (ref 1.7–7)
NEUTROPHILS NFR BLD AUTO: 66.8 % (ref 42.7–76)
NRBC BLD AUTO-RTO: 0 /100 WBC (ref 0–0.2)
PLATELET # BLD AUTO: 135 10*3/MM3 (ref 140–450)
PMV BLD AUTO: 11.5 FL (ref 6–12)
POTASSIUM SERPL-SCNC: 4.2 MMOL/L (ref 3.5–5.2)
PROT SERPL-MCNC: 7.8 G/DL (ref 6–8.5)
RBC # BLD AUTO: 4.96 10*6/MM3 (ref 3.77–5.28)
SODIUM SERPL-SCNC: 139 MMOL/L (ref 136–145)
T4 SERPL-MCNC: 11 MCG/DL (ref 4.5–11.7)
TRIGL SERPL-MCNC: 193 MG/DL (ref 0–150)
TSH SERPL DL<=0.05 MIU/L-ACNC: 1.81 UIU/ML (ref 0.27–4.2)
VLDLC SERPL-MCNC: 33 MG/DL (ref 5–40)
WBC NRBC COR # BLD AUTO: 5.98 10*3/MM3 (ref 3.4–10.8)

## 2024-09-24 PROCEDURE — 1159F MED LIST DOCD IN RCRD: CPT

## 2024-09-24 PROCEDURE — 36415 COLL VENOUS BLD VENIPUNCTURE: CPT

## 2024-09-24 PROCEDURE — 99213 OFFICE O/P EST LOW 20 MIN: CPT

## 2024-09-24 PROCEDURE — 80061 LIPID PANEL: CPT

## 2024-09-24 PROCEDURE — 1160F RVW MEDS BY RX/DR IN RCRD: CPT

## 2024-09-24 PROCEDURE — 85025 COMPLETE CBC W/AUTO DIFF WBC: CPT

## 2024-09-24 PROCEDURE — 84443 ASSAY THYROID STIM HORMONE: CPT

## 2024-09-24 PROCEDURE — 80053 COMPREHEN METABOLIC PANEL: CPT

## 2024-09-24 PROCEDURE — 84436 ASSAY OF TOTAL THYROXINE: CPT

## 2024-09-24 RX ORDER — CHLORHEXIDINE GLUCONATE 4 %
1 LIQUID (ML) TOPICAL DAILY
COMMUNITY

## 2024-09-24 RX ORDER — ANTIOX #8/OM3/DHA/EPA/LUT/ZEAX 250-2.5 MG
1 CAPSULE ORAL 2 TIMES DAILY
COMMUNITY

## 2024-09-27 NOTE — PROGRESS NOTES
Subjective   The ABCs of the Annual Wellness Visit  Medicare Wellness Visit      Samira Kramer is a 73 y.o. patient who presents for a Medicare Wellness Visit.    The following portions of the patient's history were reviewed and   updated as appropriate: allergies, current medications, past family history, past medical history, past social history, past surgical history, and problem list.    Compared to one year ago, the patient's physical   health is the same.  Compared to one year ago, the patient's mental   health is the same.    Recent Hospitalizations:  She was not admitted to the hospital during the last year.     Current Medical Providers:  Patient Care Team:  Jason Dunlap APRN as PCP - General (Nurse Practitioner)    Outpatient Medications Prior to Visit   Medication Sig Dispense Refill    Ascorbic Acid (Vitamin C) 500 MG/5ML liquid Daily. LAST DOSE 4/13/23      Calcium Polycarbophil (FIBER-CAPS PO) Take  by mouth. LAST DOSE 4/13/23      cholecalciferol (VITAMIN D3) 25 MCG (1000 UT) tablet Take 1 tablet by mouth Daily.      Multiple Vitamins-Minerals (Hair Skin & Nails) tablet Take 1 tablet by mouth Daily.      multivitamin with minerals tablet tablet Take 2 tablets by mouth Daily. For eyes/macular degeneration  LAST DOSE 4/13/23      multivitamins-minerals (PRESERVISION AREDS 2) capsule capsule Take 1 capsule by mouth 2 (Two) Times a Day.      pantoprazole (Protonix) 40 MG EC tablet Take 1 tablet by mouth Daily. 30 tablet 3    Vitamin A 2400 MCG (8000 UT) tablet Take 1 tablet by mouth Daily.      vitamin B-12 (CYANOCOBALAMIN) 100 MCG tablet LAST DOSE 4/13/23      Zinc 10 MG lozenge Take 50 mg by mouth 2 (Two) Times a Day. LAST DOSE 4/13/23      rosuvastatin (CRESTOR) 5 MG tablet Take 1 tablet by mouth once daily 90 tablet 0    traZODone (DESYREL) 50 MG tablet Take 2 tablets by mouth Every Night. 180 tablet 1     No facility-administered medications prior to visit.     No opioid  "medication identified on active medication list. I have reviewed chart for other potential  high risk medication/s and harmful drug interactions in the elderly.      Aspirin is not on active medication list.  Aspirin use is not indicated based on review of current medical condition/s. Risk of harm outweighs potential benefits.  .    Patient Active Problem List   Diagnosis    Gastro-esophageal reflux disease without esophagitis    Dysphagia following cerebral infarction    Hyperlipidemia    Hypothyroidism    Vitamin D deficiency    History of smoking 25-50 pack years    Diarrhea    Statin intolerance    Segmental and somatic dysfunction of lumbar region    Lesion of skin of right ear    Bilateral impacted cerumen    Tobacco abuse    Tick-borne relapsing fever    Elevated blood pressure reading    Closed fracture of proximal end of right humerus    Closed fracture of right proximal humerus    Closed 3-part fracture of proximal end of right humerus    Primary insomnia    Acute pain of right shoulder    Osteoporosis    Hematuria    Dysuria    Solid nodule of lung greater than 8 mm in diameter     Advance Care Planning Advance Directive is not on file.  ACP discussion was held with the patient during this visit. Patient does not have an advance directive, declines further assistance.            Objective   Vitals:    10/01/24 0957   BP: 126/76   Pulse: 69   Temp: 97.2 °F (36.2 °C)   SpO2: 100%   Weight: 49 kg (108 lb)   Height: 157.5 cm (62\")       Estimated body mass index is 19.75 kg/m² as calculated from the following:    Height as of this encounter: 157.5 cm (62\").    Weight as of this encounter: 49 kg (108 lb).    BMI is within normal parameters. No other follow-up for BMI required.       Does the patient have evidence of cognitive impairment? No  Lab Results   Component Value Date    TRIG 193 (H) 09/24/2024    HDL 34 (L) 09/24/2024    LDL 78 09/24/2024    VLDL 33 09/24/2024                                           "                                                      Health  Risk Assessment    Smoking Status:  Social History     Tobacco Use   Smoking Status Every Day    Current packs/day: 1.00    Types: Cigarettes    Passive exposure: Never   Smokeless Tobacco Never   Tobacco Comments    INST PER ANESTHESIA PROTOCOL, smoked last yesterday     Alcohol Consumption:  Social History     Substance and Sexual Activity   Alcohol Use Never       Fall Risk Screen  STEADI Fall Risk Assessment was completed, and patient is at LOW risk for falls.Assessment completed on:10/1/2024    Depression Screening:      10/1/2024     9:59 AM   PHQ-2/PHQ-9 Depression Screening   Little Interest or Pleasure in Doing Things 0-->not at all   Feeling Down, Depressed or Hopeless 0-->not at all   PHQ-9: Brief Depression Severity Measure Score 0     Health Habits and Functional and Cognitive Screening:      10/1/2024     9:59 AM   Functional & Cognitive Status   Do you have difficulty preparing food and eating? No   Do you have difficulty bathing yourself, getting dressed or grooming yourself? No   Do you have difficulty using the toilet? No   Do you have difficulty moving around from place to place? No   Do you have trouble with steps or getting out of a bed or a chair? No   Current Diet Well Balanced Diet   Dental Exam Up to date   Eye Exam Up to date   Current Exercises Include No Regular Exercise   Do you need help using the phone?  No   Are you deaf or do you have serious difficulty hearing?  No   Do you need help to go to places out of walking distance? No   Do you need help shopping? No   Do you need help preparing meals?  No   Do you need help with housework?  No   Do you need help with laundry? No   Do you need help taking your medications? No   Do you need help managing money? No   Do you ever drive or ride in a car without wearing a seat belt? No   Have you felt unusual stress, anger or loneliness in the last month? No   Who do you live with?  Spouse   If you need help, do you have trouble finding someone available to you? No   Have you been bothered in the last four weeks by sexual problems? No   Do you have difficulty concentrating, remembering or making decisions? No           Age-appropriate Screening Schedule:  Refer to the list below for future screening recommendations based on patient's age, sex and/or medical conditions. Orders for these recommended tests are listed in the plan section. The patient has been provided with a written plan.    Health Maintenance List  Health Maintenance   Topic Date Due    MAMMOGRAM  Never done    INFLUENZA VACCINE  08/01/2024    ZOSTER VACCINE (2 of 3) 01/24/2025 (Originally 9/11/2017)    TDAP/TD VACCINES (3 - Tdap) 01/25/2025 (Originally 1/1/2022)    COVID-19 Vaccine (6 - 2023-24 season) 10/01/2025 (Originally 9/1/2024)    DXA SCAN  09/08/2025    LIPID PANEL  09/24/2025    ANNUAL WELLNESS VISIT  10/01/2025    COLORECTAL CANCER SCREENING  11/14/2028    HEPATITIS C SCREENING  Completed    Pneumococcal Vaccine 65+  Completed                                                                                                                                                CMS Preventative Services Quick Reference  Risk Factors Identified During Encounter  Immunizations Discussed/Encouraged: Influenza    The above risks/problems have been discussed with the patient.  Pertinent information has been shared with the patient in the After Visit Summary.  An After Visit Summary and PPPS were made available to the patient.    Follow Up:  Next Medicare Wellness visit to be scheduled in 1 year.          Additional E&M Note during same encounter follows:  Patient has multiple medical problems which are significant and separately identifiable that require additional work above and beyond the Medicare Wellness Visit.      Chief Complaint  Medicare Wellness-subsequent, Heartburn, Hyperlipidemia, Hypothyroidism, and Insomnia    Samira  "Nithya Kramer is a 73 y.o. female who presents to [facility]   follow up for HTN, hyperlipidemia, hypothyroidism, GERD and lung nodule    review lab results      Labs-9/2024  Mammo- never refuses   Dexa- 9/2023  Ct chest 4/1/2024    C/o she would like to discuss Pantoprazole and if she still needs to take it.     Pt c/o wound lower left leg x2 days pulled out a piece of would, red, warm       Objective   Vital Signs:   Vitals:    10/01/24 0957   BP: 126/76   Pulse: 69   Temp: 97.2 °F (36.2 °C)   SpO2: 100%   Weight: 49 kg (108 lb)   Height: 157.5 cm (62\")       Wt Readings from Last 3 Encounters:   10/01/24 49 kg (108 lb)   09/24/24 49.2 kg (108 lb 6.4 oz)   09/12/24 49.2 kg (108 lb 6.4 oz)     BP Readings from Last 3 Encounters:   10/01/24 126/76   09/24/24 135/81   06/26/24 119/70       Physical Exam  HENT:      Right Ear: Tympanic membrane normal.      Left Ear: Tympanic membrane normal.      Nose: Nose normal.      Mouth/Throat:      Mouth: Mucous membranes are moist.   Eyes:      Conjunctiva/sclera: Conjunctivae normal.   Neck:      Vascular: No carotid bruit.   Cardiovascular:      Rate and Rhythm: Normal rate and regular rhythm.      Heart sounds: Normal heart sounds. No murmur heard.  Pulmonary:      Effort: Pulmonary effort is normal.      Breath sounds: Normal breath sounds.   Abdominal:      General: Bowel sounds are normal.      Palpations: Abdomen is soft.   Musculoskeletal:      Right lower leg: No edema.      Left lower leg: No edema.   Skin:     General: Skin is warm and dry.             Comments: Scabbed center lesion posterior lower leg with surrounding erythema, yellow drainage   Neurological:      Mental Status: She is alert.   Psychiatric:         Mood and Affect: Mood normal.         Behavior: Behavior normal.         The following data was reviewed by MICHELLE Albrecht on 10/01/2024        Assessment & Plan   Diagnoses and all orders for this " visit:    1. Encounter for Medicare annual wellness exam (Primary)    2. Mixed hyperlipidemia    3. Acquired hypothyroidism    4. Primary insomnia    5. History of smoking 25-50 pack years    6. Tobacco abuse    7. Vitamin D deficiency    8. Cellulitis of left lower extremity  -     Wound Culture - Wound, Leg, Left; Future    Other orders  -     rosuvastatin (CRESTOR) 5 MG tablet; Take 1 tablet by mouth Daily.  Dispense: 90 tablet; Refill: 0  -     traZODone (DESYREL) 50 MG tablet; Take 2 tablets by mouth Every Night.  Dispense: 180 tablet; Refill: 1  -     cephalexin (Keflex) 500 MG capsule; Take 1 capsule by mouth 2 (Two) Times a Day.  Dispense: 20 capsule; Refill: 0    Encounter for annual Medicare wellness exam immunizations screening reviewed with patient  Hyperlipidemia LDL below goal on current statin dose Crestor 5 mg at nighttime liver enzymes normal denies myalgias  Hypothyroidism TSH within goal range without current treatment at this time  Insomnia stable on trazodone denies morning sedation  History of greater than 25 smoking pack years with tobacco abuse CT low-dose of the chest up-to-date  Vitamin D deficiency recommend 2000 units supplementation daily  Cellulitis left lower extremity wound culture obtained will treat with Keflex recommend washing with soap and water daily will call with culture results and further recommendations      BMI is within normal parameters. No other follow-up for BMI required.       FOLLOW UP  Return in about 6 months (around 4/1/2025).  Patient was given instructions and counseling regarding her condition or for health maintenance advice. Please see specific information pulled into the AVS if appropriate.     MICHELLE Albrecht  10/01/24  10:28 EDT

## 2024-10-01 ENCOUNTER — OFFICE VISIT (OUTPATIENT)
Dept: FAMILY MEDICINE CLINIC | Facility: CLINIC | Age: 74
End: 2024-10-01
Payer: MEDICARE

## 2024-10-01 VITALS
HEART RATE: 69 BPM | BODY MASS INDEX: 19.88 KG/M2 | OXYGEN SATURATION: 100 % | TEMPERATURE: 97.2 F | SYSTOLIC BLOOD PRESSURE: 126 MMHG | WEIGHT: 108 LBS | HEIGHT: 62 IN | DIASTOLIC BLOOD PRESSURE: 76 MMHG

## 2024-10-01 DIAGNOSIS — E55.9 VITAMIN D DEFICIENCY: ICD-10-CM

## 2024-10-01 DIAGNOSIS — F51.01 PRIMARY INSOMNIA: ICD-10-CM

## 2024-10-01 DIAGNOSIS — E78.2 MIXED HYPERLIPIDEMIA: ICD-10-CM

## 2024-10-01 DIAGNOSIS — E03.9 ACQUIRED HYPOTHYROIDISM: ICD-10-CM

## 2024-10-01 DIAGNOSIS — Z72.0 TOBACCO ABUSE: ICD-10-CM

## 2024-10-01 DIAGNOSIS — L03.116 CELLULITIS OF LEFT LOWER EXTREMITY: ICD-10-CM

## 2024-10-01 DIAGNOSIS — Z87.891 HISTORY OF SMOKING 25-50 PACK YEARS: ICD-10-CM

## 2024-10-01 DIAGNOSIS — Z00.00 ENCOUNTER FOR MEDICARE ANNUAL WELLNESS EXAM: Primary | ICD-10-CM

## 2024-10-01 PROCEDURE — 87205 SMEAR GRAM STAIN: CPT | Performed by: NURSE PRACTITIONER

## 2024-10-01 PROCEDURE — 90662 IIV NO PRSV INCREASED AG IM: CPT | Performed by: NURSE PRACTITIONER

## 2024-10-01 PROCEDURE — G0008 ADMIN INFLUENZA VIRUS VAC: HCPCS | Performed by: NURSE PRACTITIONER

## 2024-10-01 PROCEDURE — 99214 OFFICE O/P EST MOD 30 MIN: CPT | Performed by: NURSE PRACTITIONER

## 2024-10-01 PROCEDURE — 1170F FXNL STATUS ASSESSED: CPT | Performed by: NURSE PRACTITIONER

## 2024-10-01 PROCEDURE — 87070 CULTURE OTHR SPECIMN AEROBIC: CPT | Performed by: NURSE PRACTITIONER

## 2024-10-01 PROCEDURE — G0439 PPPS, SUBSEQ VISIT: HCPCS | Performed by: NURSE PRACTITIONER

## 2024-10-01 PROCEDURE — 1125F AMNT PAIN NOTED PAIN PRSNT: CPT | Performed by: NURSE PRACTITIONER

## 2024-10-01 RX ORDER — CEPHALEXIN 500 MG/1
500 CAPSULE ORAL 2 TIMES DAILY
Qty: 20 CAPSULE | Refills: 0 | Status: SHIPPED | OUTPATIENT
Start: 2024-10-01

## 2024-10-01 RX ORDER — ROSUVASTATIN CALCIUM 5 MG/1
5 TABLET, COATED ORAL DAILY
Qty: 90 TABLET | Refills: 0 | Status: SHIPPED | OUTPATIENT
Start: 2024-10-01

## 2024-10-01 RX ORDER — TRAZODONE HYDROCHLORIDE 50 MG/1
100 TABLET, FILM COATED ORAL NIGHTLY
Qty: 180 TABLET | Refills: 1 | Status: SHIPPED | OUTPATIENT
Start: 2024-10-01

## 2024-10-04 LAB
BACTERIA SPEC AEROBE CULT: NORMAL
GRAM STN SPEC: NORMAL
GRAM STN SPEC: NORMAL

## 2024-11-14 ENCOUNTER — TELEPHONE (OUTPATIENT)
Dept: CASE MANAGEMENT | Facility: OTHER | Age: 74
End: 2024-11-14
Payer: MEDICARE

## 2024-11-14 DIAGNOSIS — E03.9 ACQUIRED HYPOTHYROIDISM: ICD-10-CM

## 2024-11-14 DIAGNOSIS — R53.1 WEAKNESS: ICD-10-CM

## 2024-11-14 DIAGNOSIS — Z78.9 STATIN INTOLERANCE: ICD-10-CM

## 2024-11-14 DIAGNOSIS — N28.9 RENAL INSUFFICIENCY: ICD-10-CM

## 2024-11-14 DIAGNOSIS — E78.2 MIXED HYPERLIPIDEMIA: Primary | ICD-10-CM

## 2024-11-14 DIAGNOSIS — E03.9 HYPOTHYROIDISM, UNSPECIFIED TYPE: ICD-10-CM

## 2024-11-14 DIAGNOSIS — E55.9 VITAMIN D DEFICIENCY: ICD-10-CM

## 2024-11-14 NOTE — TELEPHONE ENCOUNTER
Ms Kramer left message on my voicemail requesting lab orders be placed that she will need to have drawn before her next office visit which is 4/1/25.

## 2025-01-27 ENCOUNTER — TELEPHONE (OUTPATIENT)
Dept: FAMILY MEDICINE CLINIC | Facility: CLINIC | Age: 75
End: 2025-01-27
Payer: MEDICARE

## 2025-01-27 RX ORDER — ROSUVASTATIN CALCIUM 5 MG/1
5 TABLET, COATED ORAL DAILY
Qty: 90 TABLET | Refills: 0 | Status: SHIPPED | OUTPATIENT
Start: 2025-01-27

## 2025-01-27 NOTE — TELEPHONE ENCOUNTER
Pt called states she needs refill on her Levothyroxine it show it was discont by another provider. So this pt still be on this  she says she is

## 2025-01-27 NOTE — TELEPHONE ENCOUNTER
Caller: Williams Samirashaq Barriga    Relationship: Self    Best call back number: [phone]     Requested Prescriptions:   Requested Prescriptions     Pending Prescriptions Disp Refills    rosuvastatin (CRESTOR) 5 MG tablet 90 tablet 0     Sig: Take 1 tablet by mouth Daily.        Pharmacy where request should be sent: [facility]/PHARMACY #[id number] - [place], [place] - [address] [facility] - [phone]  - [phone] FX     Last office visit with prescribing clinician: 10/1/2024   Last telemedicine visit with prescribing clinician: Visit date not found   Next office visit with prescribing clinician: 4/1/2025         Does the patient have less than a 3 day supply:  [] Yes  [x] No    Would you like a call back once the refill request has been completed: [] Yes [] No    If the office needs to give you a call back, can they leave a voicemail: [] Yes [] No    Yi Kilpatrick Rep   01/27/25 10:10 EST

## 2025-01-28 DIAGNOSIS — E03.9 ACQUIRED HYPOTHYROIDISM: Primary | ICD-10-CM

## 2025-01-28 RX ORDER — LEVOTHYROXINE SODIUM 50 UG/1
50 TABLET ORAL
Qty: 90 TABLET | Refills: 1 | Status: SHIPPED | OUTPATIENT
Start: 2025-01-28

## 2025-03-12 ENCOUNTER — TELEPHONE (OUTPATIENT)
Dept: FAMILY MEDICINE CLINIC | Facility: CLINIC | Age: 75
End: 2025-03-12
Payer: MEDICARE

## 2025-03-26 ENCOUNTER — LAB (OUTPATIENT)
Dept: LAB | Facility: HOSPITAL | Age: 75
End: 2025-03-26
Payer: MEDICARE

## 2025-03-26 DIAGNOSIS — E78.2 MIXED HYPERLIPIDEMIA: ICD-10-CM

## 2025-03-26 DIAGNOSIS — Z78.9 STATIN INTOLERANCE: ICD-10-CM

## 2025-03-26 DIAGNOSIS — R53.1 WEAKNESS: ICD-10-CM

## 2025-03-26 DIAGNOSIS — E03.9 ACQUIRED HYPOTHYROIDISM: ICD-10-CM

## 2025-03-26 LAB
ALBUMIN SERPL-MCNC: 4 G/DL (ref 3.5–5.2)
ALBUMIN/GLOB SERPL: 1.3 G/DL
ALP SERPL-CCNC: 93 U/L (ref 39–117)
ALT SERPL W P-5'-P-CCNC: 12 U/L (ref 1–33)
ANION GAP SERPL CALCULATED.3IONS-SCNC: 9 MMOL/L (ref 5–15)
AST SERPL-CCNC: 22 U/L (ref 1–32)
BASOPHILS # BLD AUTO: 0.02 10*3/MM3 (ref 0–0.2)
BASOPHILS NFR BLD AUTO: 0.3 % (ref 0–1.5)
BILIRUB SERPL-MCNC: 0.2 MG/DL (ref 0–1.2)
BUN SERPL-MCNC: 14 MG/DL (ref 8–23)
BUN/CREAT SERPL: 14.9 (ref 7–25)
CALCIUM SPEC-SCNC: 9.4 MG/DL (ref 8.6–10.5)
CHLORIDE SERPL-SCNC: 107 MMOL/L (ref 98–107)
CHOLEST SERPL-MCNC: 137 MG/DL (ref 0–200)
CO2 SERPL-SCNC: 25 MMOL/L (ref 22–29)
CREAT SERPL-MCNC: 0.94 MG/DL (ref 0.57–1)
DEPRECATED RDW RBC AUTO: 47.5 FL (ref 37–54)
EGFRCR SERPLBLD CKD-EPI 2021: 63.4 ML/MIN/1.73
EOSINOPHIL # BLD AUTO: 0.22 10*3/MM3 (ref 0–0.4)
EOSINOPHIL NFR BLD AUTO: 3.6 % (ref 0.3–6.2)
ERYTHROCYTE [DISTWIDTH] IN BLOOD BY AUTOMATED COUNT: 12.9 % (ref 12.3–15.4)
GLOBULIN UR ELPH-MCNC: 3 GM/DL
GLUCOSE SERPL-MCNC: 83 MG/DL (ref 65–99)
HCT VFR BLD AUTO: 45.3 % (ref 34–46.6)
HDLC SERPL-MCNC: 35 MG/DL (ref 40–60)
HGB BLD-MCNC: 15.1 G/DL (ref 12–15.9)
IMM GRANULOCYTES # BLD AUTO: 0.01 10*3/MM3 (ref 0–0.05)
IMM GRANULOCYTES NFR BLD AUTO: 0.2 % (ref 0–0.5)
LDLC SERPL CALC-MCNC: 76 MG/DL (ref 0–100)
LDLC/HDLC SERPL: 2.06 {RATIO}
LYMPHOCYTES # BLD AUTO: 1.57 10*3/MM3 (ref 0.7–3.1)
LYMPHOCYTES NFR BLD AUTO: 25.4 % (ref 19.6–45.3)
MCH RBC QN AUTO: 33.7 PG (ref 26.6–33)
MCHC RBC AUTO-ENTMCNC: 33.3 G/DL (ref 31.5–35.7)
MCV RBC AUTO: 101.1 FL (ref 79–97)
MONOCYTES # BLD AUTO: 0.46 10*3/MM3 (ref 0.1–0.9)
MONOCYTES NFR BLD AUTO: 7.4 % (ref 5–12)
NEUTROPHILS NFR BLD AUTO: 3.91 10*3/MM3 (ref 1.7–7)
NEUTROPHILS NFR BLD AUTO: 63.1 % (ref 42.7–76)
NRBC BLD AUTO-RTO: 0 /100 WBC (ref 0–0.2)
PLATELET # BLD AUTO: 159 10*3/MM3 (ref 140–450)
PMV BLD AUTO: 10.6 FL (ref 6–12)
POTASSIUM SERPL-SCNC: 4 MMOL/L (ref 3.5–5.2)
PROT SERPL-MCNC: 7 G/DL (ref 6–8.5)
RBC # BLD AUTO: 4.48 10*6/MM3 (ref 3.77–5.28)
SODIUM SERPL-SCNC: 141 MMOL/L (ref 136–145)
T4 SERPL-MCNC: 8.8 MCG/DL (ref 4.5–11.7)
TRIGL SERPL-MCNC: 149 MG/DL (ref 0–150)
TSH SERPL DL<=0.05 MIU/L-ACNC: 1.98 UIU/ML (ref 0.27–4.2)
VLDLC SERPL-MCNC: 26 MG/DL (ref 5–40)
WBC NRBC COR # BLD AUTO: 6.19 10*3/MM3 (ref 3.4–10.8)

## 2025-03-26 PROCEDURE — 80061 LIPID PANEL: CPT

## 2025-03-26 PROCEDURE — 80053 COMPREHEN METABOLIC PANEL: CPT

## 2025-03-26 PROCEDURE — 84443 ASSAY THYROID STIM HORMONE: CPT

## 2025-03-26 PROCEDURE — 85025 COMPLETE CBC W/AUTO DIFF WBC: CPT

## 2025-03-26 PROCEDURE — 84436 ASSAY OF TOTAL THYROXINE: CPT

## 2025-03-26 PROCEDURE — 36415 COLL VENOUS BLD VENIPUNCTURE: CPT

## 2025-03-31 NOTE — PROGRESS NOTES
Follow Up Office Visit      Patient Name: Samira Kramer  : 1950   MRN: [id number]     Chief Complaint:    Chief Complaint   Patient presents with    Hyperlipidemia    Hypertension    Hypothyroidism    Heartburn       History of Present Illness: Samira Kramer is a 74 y.o. female who is here today to follow up for  HTN, hyperlipidemia, hypothyroidism, GERD and lung nodule    review lab results     Pt c/o passing out fell from standing patient reports this happened when she was reaching overhead with a broom trying to clear her roof states this is the second time that she passed out when standing reaching overhead the first time was approximately a year ago when she broke her shoulder    Patient states her right ear feels the need to pop  Denies denies,   Feels tired then pass out   States the minute she is laying down she wakes back up     Labs-3/2025  Mammo- never refuses   Dexa- 2023  Ct chest 2024   Smoking history 1 PPD for 50 years   Pt declines smoking cessation tried chantix in past caused nightmares  Reports allergic reaction to the patches causing swelling and redness       Last follow up with cardiologist per progress note  1. Chest pain, unspecified type (Primary)   2. Mixed hyperlipidemia   3. Tobacco abuse   Assessment & Plan  1. Chest discomfort.  Her blood pressure and heart rate are within normal limits today. The chest discomfort she experienced a few months ago was likely due to reflux rather than cardiac issues. She reports that the pain resolved within three days of starting Protonix and has remained absent. She took her last dose of Protonix last night. A trial off Protonix is recommended. If symptoms recur, she should consult her primary care physician for a prescription refill. Given the reflux-related nature of her symptoms, it is appropriate for her primary care physician to manage this aspect of her health. A referral to a gastroenterologist may be  necessary for further evaluation, including potential endoscopy.   2.  Continue statin therapy.    3.  Tobacco cessation is advised.    Stress test results   Date of Study: 6/4/24   Ordering Provider: Bao Canales MD    Clinical Indications: Chest Pain          Reading Physicians  Performing Staff   ECG [place], SPECT [place]: Bao Canales MD     Tech: Promise Valdez          [facility] PACS Images     Show images for Stress Test With Myocardial Perfusion One Day   Interpretation Summary         Left ventricular ejection fraction is hyperdynamic (Calculated EF > 70%).    Patient walked 9 minutes and 2 seconds on modified Ilia protocol achieving 4.82 metabolic equivalents.  The patient was then given Lexiscan.  She did get to 84% maximum predicted heart rate.    Stress electrocardiogram showed no ischemia.    Cardia perfusion images show a fixed anteroapical defect.  No obvious reversible perfusion defect greater than 20% was noted.    Feel this represents a normal Miranda-walk Cardiolite with no evidence of ischemia.         Subjective      Review of Systems:   Review of Systems   Constitutional:  Negative for fever.   Respiratory:  Negative for cough.    Cardiovascular:  Negative for chest pain.   Neurological:  Positive for syncope.        Past Medical History:   Past Medical History:   Diagnosis Date    Gastro-esophageal reflux disease without esophagitis     Humerus fracture     RIGHT PROXIMAL    Hyperlipidemia, unspecified     Hypothyroidism, unspecified     Kidney stone     NO CURRENT ISSUES    Macular degeneration 2021    Smoker     Vitamin D deficiency, unspecified        Past Surgical History:   Past Surgical History:   Procedure Laterality Date    APPENDECTOMY      COLONOSCOPY  07/2017, 12/2018    per constantin Dobson, due in 2027    ORIF HUMERUS FRACTURE Right 4/18/2023    Procedure: HUMERUS PROXIMAL OPEN REDUCTION INTERNAL FIXATION;  Surgeon: Keshawn Arellano MD;  Location: [facility] OR;   Service: Orthopedics;  Laterality: Right;    URETEROSCOPY  02/2018    SCOPE-BLADDER AND KIDNEYS       Family History:   Family History   Problem Relation Age of Onset    Cancer Mother         TYPE NOT SPECIFIED    Cancer Father         TYPE NOT SPECIFIED    Malig Hyperthermia Neg Hx        Social History:   Social History     Socioeconomic History    Marital status:    Tobacco Use    Smoking status: Every Day     Current packs/day: 1.00     Average packs/day: 1 pack/day for 57.2 years (57.2 ttl pk-yrs)     Types: Cigarettes     Start date: 1968     Passive exposure: Never    Smokeless tobacco: Never    Tobacco comments:     INST PER ANESTHESIA PROTOCOL, smoked last yesterday   Vaping Use    Vaping status: Never Used   Substance and Sexual Activity    Alcohol use: Never    Drug use: Never    Sexual activity: Defer       Medications:     Current Outpatient Medications:     Ascorbic Acid (Vitamin C) 500 MG/5ML liquid, Daily. LAST DOSE 4/13/23, Disp: , Rfl:     Calcium Polycarbophil (FIBER-CAPS PO), Take  by mouth. LAST DOSE 4/13/23, Disp: , Rfl:     cholecalciferol (VITAMIN D3) 25 MCG (1000 UT) tablet, Take 1 tablet by mouth Daily., Disp: , Rfl:     levothyroxine (Synthroid) 50 MCG tablet, Take 1 tablet by mouth Every Morning., Disp: 90 tablet, Rfl: 1    Multiple Vitamins-Minerals (Hair Skin & Nails) tablet, Take 1 tablet by mouth Daily., Disp: , Rfl:     multivitamin with minerals tablet tablet, Take 2 tablets by mouth Daily. For eyes/macular degeneration LAST DOSE 4/13/23, Disp: , Rfl:     multivitamins-minerals (PRESERVISION AREDS 2) capsule capsule, Take 1 capsule by mouth 2 (Two) Times a Day., Disp: , Rfl:     pantoprazole (Protonix) 40 MG EC tablet, Take 1 tablet by mouth Daily., Disp: 30 tablet, Rfl: 3    rosuvastatin (CRESTOR) 5 MG tablet, Take 1 tablet by mouth Daily., Disp: 90 tablet, Rfl: 0    traZODone (DESYREL) 50 MG tablet, Take 2 tablets by mouth Every Night., Disp: 180 tablet, Rfl: 1    Vitamin A  "2400 MCG (8000 UT) tablet, Take 1 tablet by mouth Daily., Disp: , Rfl:     vitamin B-12 (CYANOCOBALAMIN) 100 MCG tablet, LAST DOSE 4/13/23, Disp: , Rfl:     Zinc 10 MG lozenge, Take 50 mg by mouth 2 (Two) Times a Day. LAST DOSE 4/13/23, Disp: , Rfl:     Allergies:   Allergies   Allergen Reactions    Fenofibrate Hives    Fosamax [Alendronate] Other (See Comments)     Chest pain    Aleve [Naproxen] GI Intolerance    Atorvastatin Myalgia    Nsaids GI Intolerance    Omega 3-6-9 Fatty Acids Diarrhea    Penicillins Hives    Sulfa Antibiotics Rash    Zetia [Ezetimibe] Rash           Objective     Physical Exam:  Vital Signs:   Vitals:    04/01/25 1019   BP: 125/74   Pulse: 67   SpO2: 98%   Weight: 50.6 kg (111 lb 9.6 oz)   Height: 157.5 cm (62\")   PainSc: 0-No pain     Body mass index is 20.41 kg/m².   BMI is within normal parameters. No other follow-up for BMI required.       Physical Exam  HENT:      Nose: Nose normal.      Mouth/Throat:      Mouth: Mucous membranes are moist.   Eyes:      Conjunctiva/sclera: Conjunctivae normal.   Neck:      Thyroid: No thyroid tenderness.      Vascular: Carotid bruit present.   Cardiovascular:      Rate and Rhythm: Normal rate and regular rhythm.      Heart sounds: Normal heart sounds. No murmur heard.  Pulmonary:      Effort: Pulmonary effort is normal.      Breath sounds: Normal breath sounds.   Abdominal:      General: Bowel sounds are normal.      Palpations: Abdomen is soft.   Musculoskeletal:      Right lower leg: No edema.      Left lower leg: No edema.   Skin:     General: Skin is warm and dry.   Neurological:      Mental Status: She is alert.   Psychiatric:         Mood and Affect: Mood normal.         Behavior: Behavior normal.       ECG 12 Lead    Date/Time: 4/1/2025 1:08 PM  Performed by: Jason Dunlap APRN    Authorized by: Jason Dunlap APRN  Comparison: compared with previous ECG   Similar to previous ECG  Comparison to previous ECG: HEART RATE= " 70  bpm  RR Interval= 860  ms  MI Interval= 190  ms  P Horizontal Axis= -7  deg  P Front Axis= 58  deg  QRSD Interval= 91  ms  QT Interval= 376  ms  QRS Axis= 49  deg  T Wave Axis= 41  deg  - OTHERWISE NORMAL ECG -  Sinus rhythm  RSR' in V1 or V2, probably normal variant        Rhythm: sinus rhythm  Comments: Sinus rhythm  Minimal ST elevation inferior leads               Assessment / Plan      Assessment/Plan:   Diagnoses and all orders for this visit:    1. Syncope, unspecified syncope type  -     Duplex Carotid Ultrasound CAR; Future  -     Holter Monitor - 48 Hour; Future  -     Adult Transthoracic Echo Complete W/ Cont if Necessary Per Protocol; Future  -     CT Head Without Contrast; Future  -     ECG 12 Lead    2. Mixed hyperlipidemia    3. Acquired hypothyroidism  -     levothyroxine (Synthroid) 50 MCG tablet; Take 1 tablet by mouth Every Morning.  Dispense: 90 tablet; Refill: 1    4. Gastro-esophageal reflux disease without esophagitis    5. Vitamin D deficiency    6. History of smoking 25-50 pack years  -     CT Chest Low Dose Wo; Future    7. Tobacco abuse  -     CT Chest Low Dose Wo; Future    Other orders  -     traZODone (DESYREL) 50 MG tablet; Take 2 tablets by mouth Every Night.  Dispense: 180 tablet; Refill: 1  -     rosuvastatin (CRESTOR) 5 MG tablet; Take 1 tablet by mouth Daily.  Dispense: 90 tablet; Refill: 0  -     pantoprazole (Protonix) 40 MG EC tablet; Take 1 tablet by mouth Daily.  Dispense: 30 tablet; Refill: 3       Syncope EKG in office completed no acute findings compared to previous EKG no acute changes will obtain Holter monitor and echocardiogram carotid Doppler CT of the head will call with all results and further recommendations if syncopal episode occur again recommend calling 911 or report to the nearest emergency room  Hyperlipidemia LDL below goal on current statin dose Crestor 5 mg at nighttime denies myalgias liver enzymes normal  Hypothyroidism TSH within goal range on  "current Synthroid dose 50 mcg daily denies palpitations  Reflux currently controlled with Protonix  Vitamin D deficiency continue daily supplementation  History of smoking greater than 25 smoking pack years of tobacco abuse will obtain updated CT low-dose of the chest        Follow Up:   Return in about 6 months (around 10/1/2025).    Meera Dunlap, APRN    \"Please note that portions of this note were completed with a voice recognition program.\"     "

## 2025-04-01 ENCOUNTER — OFFICE VISIT (OUTPATIENT)
Dept: FAMILY MEDICINE CLINIC | Facility: CLINIC | Age: 75
End: 2025-04-01
Payer: MEDICARE

## 2025-04-01 VITALS
OXYGEN SATURATION: 98 % | WEIGHT: 111.6 LBS | BODY MASS INDEX: 20.54 KG/M2 | HEART RATE: 67 BPM | DIASTOLIC BLOOD PRESSURE: 74 MMHG | SYSTOLIC BLOOD PRESSURE: 125 MMHG | HEIGHT: 62 IN

## 2025-04-01 DIAGNOSIS — R55 SYNCOPE, UNSPECIFIED SYNCOPE TYPE: ICD-10-CM

## 2025-04-01 DIAGNOSIS — E78.2 MIXED HYPERLIPIDEMIA: ICD-10-CM

## 2025-04-01 DIAGNOSIS — E55.9 VITAMIN D DEFICIENCY: ICD-10-CM

## 2025-04-01 DIAGNOSIS — E03.9 ACQUIRED HYPOTHYROIDISM: ICD-10-CM

## 2025-04-01 DIAGNOSIS — K21.9 GASTRO-ESOPHAGEAL REFLUX DISEASE WITHOUT ESOPHAGITIS: ICD-10-CM

## 2025-04-01 DIAGNOSIS — Z72.0 TOBACCO ABUSE: ICD-10-CM

## 2025-04-01 DIAGNOSIS — Z87.891 HISTORY OF SMOKING 25-50 PACK YEARS: ICD-10-CM

## 2025-04-01 RX ORDER — LEVOTHYROXINE SODIUM 50 UG/1
50 TABLET ORAL
Qty: 90 TABLET | Refills: 1 | Status: SHIPPED | OUTPATIENT
Start: 2025-04-01

## 2025-04-01 RX ORDER — PANTOPRAZOLE SODIUM 40 MG/1
40 TABLET, DELAYED RELEASE ORAL DAILY
Qty: 30 TABLET | Refills: 3 | Status: SHIPPED | OUTPATIENT
Start: 2025-04-01

## 2025-04-01 RX ORDER — TRAZODONE HYDROCHLORIDE 50 MG/1
100 TABLET ORAL NIGHTLY
Qty: 180 TABLET | Refills: 1 | Status: SHIPPED | OUTPATIENT
Start: 2025-04-01

## 2025-04-01 RX ORDER — ROSUVASTATIN CALCIUM 5 MG/1
5 TABLET, COATED ORAL DAILY
Qty: 90 TABLET | Refills: 0 | Status: SHIPPED | OUTPATIENT
Start: 2025-04-01

## 2025-04-03 ENCOUNTER — OFFICE VISIT (OUTPATIENT)
Dept: OTOLARYNGOLOGY | Facility: CLINIC | Age: 75
End: 2025-04-03
Payer: MEDICARE

## 2025-04-03 ENCOUNTER — LAB (OUTPATIENT)
Dept: LAB | Facility: HOSPITAL | Age: 75
End: 2025-04-03
Payer: MEDICARE

## 2025-04-03 VITALS
BODY MASS INDEX: 20.61 KG/M2 | TEMPERATURE: 97.2 F | DIASTOLIC BLOOD PRESSURE: 72 MMHG | WEIGHT: 112 LBS | HEART RATE: 69 BPM | HEIGHT: 62 IN | SYSTOLIC BLOOD PRESSURE: 126 MMHG

## 2025-04-03 DIAGNOSIS — E03.9 HYPOTHYROIDISM (ACQUIRED): ICD-10-CM

## 2025-04-03 DIAGNOSIS — K11.8 PAROTID MASS: Primary | ICD-10-CM

## 2025-04-03 DIAGNOSIS — E03.9 HYPOTHYROIDISM (ACQUIRED): Primary | ICD-10-CM

## 2025-04-03 DIAGNOSIS — H61.23 HEARING LOSS DUE TO CERUMEN IMPACTION, BILATERAL: ICD-10-CM

## 2025-04-03 LAB
T3FREE SERPL-MCNC: 2.54 PG/ML (ref 2–4.4)
T4 FREE SERPL-MCNC: 1.26 NG/DL (ref 0.92–1.68)
TSH SERPL DL<=0.05 MIU/L-ACNC: 1.76 UIU/ML (ref 0.27–4.2)

## 2025-04-03 PROCEDURE — 84481 FREE ASSAY (FT-3): CPT

## 2025-04-03 PROCEDURE — 84443 ASSAY THYROID STIM HORMONE: CPT

## 2025-04-03 PROCEDURE — 84439 ASSAY OF FREE THYROXINE: CPT

## 2025-04-03 PROCEDURE — 36415 COLL VENOUS BLD VENIPUNCTURE: CPT

## 2025-04-03 RX ORDER — LIOTHYRONINE SODIUM 5 UG/1
5 TABLET ORAL DAILY
Qty: 90 TABLET | Refills: 2 | Status: SHIPPED | OUTPATIENT
Start: 2025-04-03

## 2025-04-03 NOTE — PROGRESS NOTES
Patient Name: Samira Kramer   Visit Date: 04/03/2025   Patient ID: [id number]  Provider: Vlad Rodriguez MD    Sex: female  Location: [facility]   YOB: 1950  Location Address: [address] [place] [address], [address] [place] [address],?[place]?[id number]    Primary Care Provider Jason Dunlap, MICHELLE  Location Phone: [phone]    Referring Provider: No ref. provider found        Chief Complaint  1 year follow up with ear cleaning, Hearing Loss, extended ear canal stenosis, and parotid mass    History of Present Illness  Samira Kramer is a 74 y.o. female who presents to [facility] for 1 year follow up with ear cleaning, Hearing Loss, extended ear canal stenosis, and parotid mass.   Patient had a screening CT of chest performed on 12/21/2023 showing 9 mm pleural-based nodule.  Subsequently she underwent PET/CT on 1/22/2024 showing 1.2 cm x 0.8 cm left parotid soft tissue mass and also a 0.6 cm x 0.5 cm mass that is well-circumscribed in the right parotid gland as well.  There were both hypermetabolic and suspected that it might represent Warthin's tumor.  9 mm pleural-based nodule in the right medial right lower lobe was less nodular and was not hypermetabolic.  Follow-up CT of chest on 4/1/2024 showed millimeter pleural-based right lower lobe nodule has decreased in size to 7 mm consistent with benign pathology.  However CT of facial bones showed bilateral parotid masses there is 7 mm and 1.1 cm and persistent suspecting Warthin's tumor or possibly intraparotid nodes.  Patient had bilateral extensive cerumen impactions cleaned out of both ears otherwise bilateral TMs are unremarkable.  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  I would suggest the patient get her ears cleaned maybe once every 6 months.  Patient has bilateral parotid masses but unfortunately none of  them are palpable despite the fact that they showed up on the CT scan.  I would recommend conservative follow-up and I will examine again in 6 months.  When it is definitely palpable that is when I would recommend either biopsy or surgical intervention.  I will see patient in 6 months for follow-up.  Patient also concerned about her hair loss and basically she was diagnosed with hypothyroidism where she was started on levothyroxine 25 mcg p.o. daily.  6 months ago patient had that doubled to 50 mcg p.o. daily and recent lab on March 26 she only had TSH but that was current normal at middle portion at 1.98.  Other parameters were not done.  Past Medical History:   Diagnosis Date    Gastro-esophageal reflux disease without esophagitis     Humerus fracture     RIGHT PROXIMAL    Hyperlipidemia, unspecified     Hypothyroidism, unspecified     Kidney stone     NO CURRENT ISSUES    Macular degeneration 2021    Smoker     Vitamin D deficiency, unspecified        Past Surgical History:   Procedure Laterality Date    APPENDECTOMY      COLONOSCOPY  07/2017, 12/2018    per Dr. Hernandez, normal, due in 2027    ORIF HUMERUS FRACTURE Right 4/18/2023    Procedure: HUMERUS PROXIMAL OPEN REDUCTION INTERNAL FIXATION;  Surgeon: Keshawn Arellano MD;  Location: [facility];  Service: Orthopedics;  Laterality: Right;    URETEROSCOPY  02/2018    SCOPE-BLADDER AND KIDNEYS         Current Outpatient Medications:     Ascorbic Acid (Vitamin C) 500 MG/5ML liquid, Daily. LAST DOSE 4/13/23, Disp: , Rfl:     Calcium Polycarbophil (FIBER-CAPS PO), Take  by mouth. LAST DOSE 4/13/23, Disp: , Rfl:     cholecalciferol (VITAMIN D3) 25 MCG (1000 UT) tablet, Take 1 tablet by mouth Daily., Disp: , Rfl:     levothyroxine (Synthroid) 50 MCG tablet, Take 1 tablet by mouth Every Morning., Disp: 90 tablet, Rfl: 1    Multiple Vitamins-Minerals (Hair Skin & Nails) tablet, Take 1 tablet by mouth Daily., Disp: , Rfl:     multivitamin with minerals tablet tablet,  "Take 2 tablets by mouth Daily. For eyes/macular degeneration LAST DOSE 4/13/23, Disp: , Rfl:     multivitamins-minerals (PRESERVISION AREDS 2) capsule capsule, Take 1 capsule by mouth 2 (Two) Times a Day., Disp: , Rfl:     pantoprazole (Protonix) 40 MG EC tablet, Take 1 tablet by mouth Daily., Disp: 30 tablet, Rfl: 3    rosuvastatin (CRESTOR) 5 MG tablet, Take 1 tablet by mouth Daily., Disp: 90 tablet, Rfl: 0    traZODone (DESYREL) 50 MG tablet, Take 2 tablets by mouth Every Night., Disp: 180 tablet, Rfl: 1    Vitamin A 2400 MCG (8000 UT) tablet, Take 1 tablet by mouth Daily., Disp: , Rfl:     vitamin B-12 (CYANOCOBALAMIN) 100 MCG tablet, LAST DOSE 4/13/23, Disp: , Rfl:     Zinc 10 MG lozenge, Take 50 mg by mouth 2 (Two) Times a Day. LAST DOSE 4/13/23, Disp: , Rfl:      Allergies   Allergen Reactions    Fenofibrate Hives    Fosamax [Alendronate] Other (See Comments)     Chest pain    Aleve [Naproxen] GI Intolerance    Atorvastatin Myalgia    Nsaids GI Intolerance    Omega 3-6-9 Fatty Acids Diarrhea    Penicillins Hives    Sulfa Antibiotics Rash    Zetia [Ezetimibe] Rash       Social History     Tobacco Use    Smoking status: Every Day     Current packs/day: 1.00     Average packs/day: 1 pack/day for 57.3 years (57.3 ttl pk-yrs)     Types: Cigarettes     Start date: 1968     Passive exposure: Never    Smokeless tobacco: Never    Tobacco comments:     INST PER ANESTHESIA PROTOCOL, smoked last yesterday   Vaping Use    Vaping status: Never Used   Substance Use Topics    Alcohol use: Never    Drug use: Never        Objective     Vital Signs:   Vitals:    04/03/25 0857   BP: 126/72   Pulse: 69   Temp: 97.2 °F (36.2 °C)   Weight: 50.8 kg (112 lb)   Height: 157.5 cm (62\")       Tobacco Use: High Risk (4/3/2025)    Patient History     Smoking Tobacco Use: Every Day     Smokeless Tobacco Use: Never     Passive Exposure: Never         Physical Exam    Constitutional   Appearance  well developed, well-nourished, alert and in " no acute distress, voice clear and strong    Head   Inspection  no deformities or lesions      Face   Inspection  no facial lesions; House-Brackmann I/VI bilaterally   Palpation  no TMJ crepitus nor  muscle tenderness bilaterally  Parotid glands unremarkable with no palpable discrete masses present.  Left side at the tail of parotid may be a small barely ballotable area less than a centimeter.  On the right side I could not discretely palpate a mass but patient was tender in the tail of parotid region.    Eyes/Vision   Visual Fields  extraocular movements are intact, no spontaneous or gaze-induced nystagmus  Conjunctivae  clear   Sclerae  clear   Pupils and Irises  pupils equal, round, and reactive to light.   Nystagmus  not present     Ears, Nose, Mouth and Throat  Ears  External Ears  appearance within normal limits, no lesions present   Otoscopic Examination  Bilateral ear canals small and impacted with cerumen  Hearing  intact to conversational voice both ears   Tunning fork testing    Rinne:  Loyd:    Nose  External Nose  appearance normal   Intranasal Exam  mucosa within normal limits, vestibules normal, no intranasal lesions present, septum midline, sinuses non tender to percussion   Modified Adelia Test:    Oral Cavity  Oral Mucosa  oral mucosa normal without pallor or cyanosis   Lips  lip appearance normal   Teeth  normal dentition for age   Gums  gums pink, non-swollen, no bleeding present   Tongue  tongue appearance normal; normal mobility   Palate  hard palate normal, soft palate appearance normal with symmetric mobility     Throat  Oropharynx  no inflammation or lesions present, tonsils within normal limits   Hypopharynx  appearance within normal limits   Larynx  voice normal     Neck  Inspection/Palpation  normal appearance, no masses or tenderness, trachea midline; thyroid size normal, nontender, no nodules or masses present on palpation     Lymphatic  Neck  no lymphadenopathy present  "  Supraclavicular Nodes  no lymphadenopathy present   Preauricular Nodes  no lymphadenopathy present     Respiratory  Respiratory Effort  breathing unlabored   Inspection of Chest  normal appearance, no retractions     Musculoskeletal   Cervical back: Normal range of motion and neck supple.      Skin and Subcutaneous Tissue  General Inspection  Regarding face and neck - there are no rashes present, no lesions present, and no areas of discoloration     Neurologic  Cranial Nerves  cranial nerves II-XII are grossly intact bilaterally   Gait and Station  normal gait, able to stand without diffculty    Psychiatric  Judgement and Insight  judgment and insight intact   Mood and Affect  mood normal, affect appropriate       RESULTS REVIEWED    I have reviewed the following information:   [x]  Previous Internal Note  []  Previous External Note:   [x]  Ordered Tests & Results:      Pathology: No results found for: \"MICRO\"    TSH   Date Value Ref Range Status   03/26/2025 1.980 0.270 - 4.200 uIU/mL Final     T3, Free   Date Value Ref Range Status   08/23/2022 2.79 2.00 - 4.40 pg/mL Final     Free T4   Date Value Ref Range Status   06/26/2024 1.23 0.92 - 1.68 ng/dL Final     Calcium   Date Value Ref Range Status   03/26/2025 9.4 8.6 - 10.5 mg/dL Final     25 Hydroxy, Vitamin D   Date Value Ref Range Status   07/11/2023 55.9 30.0 - 100.0 ng/ml Final       No Images in the past 120 days found..    I have discussed the interpretation of the above results with the patient.    Procedures          Assessment and Plan   Diagnoses and all orders for this visit:    1. Parotid mass (Primary)    2. Hearing loss due to cerumen impaction, bilateral    3. Hypothyroidism (acquired)  -     TSH; Future  -     T4, free; Future  -     T3, Free; Future        (K11.8) Parotid mass    (H61.23) Hearing loss due to cerumen impaction, bilateral    (E03.9) Hypothyroidism (acquired) - Plan: TSH, T4, free, T3, Free     Samira Kramer  reports that " she has been smoking cigarettes. She started smoking about 57 years ago. She has a 57.3 pack-year smoking history. She has never been exposed to tobacco smoke. She has never used smokeless tobacco.         Plan:  Patient Instructions   1.  Patient has cerumen impaction both ears cleaned under microscope.  Otherwise unremarkable.  2.  Patient had history of bilateral parotid masses which was not palpable and still again after 6 months follow-up still not palpable.  I gave patient options and she would like to wait on any further workup.  Therefore I will probably check on her and a year from now.  3.  Patient is being treated with levothyroxine 50 mcg p.o. daily for hypothyroidism still complaining about hair loss.  I will repeat the labs today with all the parameters.        Follow Up   Return in about 1 year (around 4/3/2026), or Follow-up 1 year otherwise patient can get the blood work done today..  Patient was given instructions and counseling regarding her condition or for health maintenance advice. Please see specific information pulled into the AVS if appropriate.

## 2025-04-03 NOTE — PATIENT INSTRUCTIONS
1.  Patient has cerumen impaction both ears cleaned under microscope.  Otherwise unremarkable.  2.  Patient had history of bilateral parotid masses which was not palpable and still again after 6 months follow-up still not palpable.  I gave patient options and she would like to wait on any further workup.  Therefore I will probably check on her and a year from now.  3.  Patient is being treated with levothyroxine 50 mcg p.o. daily for hypothyroidism still complaining about hair loss.  I will repeat the labs today with all the parameters.

## 2025-04-15 ENCOUNTER — HOSPITAL ENCOUNTER (OUTPATIENT)
Dept: CT IMAGING | Facility: HOSPITAL | Age: 75
Discharge: HOME | End: 2025-04-15
Admitting: NURSE PRACTITIONER
Payer: MEDICARE

## 2025-04-15 DIAGNOSIS — Z87.891 HISTORY OF SMOKING 25-50 PACK YEARS: ICD-10-CM

## 2025-04-15 DIAGNOSIS — Z72.0 TOBACCO ABUSE: ICD-10-CM

## 2025-04-15 PROCEDURE — 71271 CT THORAX LUNG CANCER SCR C-: CPT

## 2025-04-17 ENCOUNTER — RESULTS FOLLOW-UP (OUTPATIENT)
Dept: FAMILY MEDICINE CLINIC | Facility: CLINIC | Age: 75
End: 2025-04-17

## 2025-04-17 DIAGNOSIS — R55 SYNCOPE, UNSPECIFIED SYNCOPE TYPE: Primary | ICD-10-CM

## 2025-04-21 DIAGNOSIS — R93.5 ABNORMAL CT SCAN, PELVIS: Primary | ICD-10-CM

## 2025-06-11 ENCOUNTER — HOSPITAL ENCOUNTER (OUTPATIENT)
Dept: CT IMAGING | Facility: HOSPITAL | Age: 75
Discharge: HOME | End: 2025-06-11
Admitting: NURSE PRACTITIONER
Payer: MEDICARE

## 2025-06-11 DIAGNOSIS — R55 SYNCOPE, UNSPECIFIED SYNCOPE TYPE: ICD-10-CM

## 2025-06-11 DIAGNOSIS — R93.5 ABNORMAL CT SCAN, PELVIS: ICD-10-CM

## 2025-06-11 PROCEDURE — 74178 CT ABD&PLV WO CNTR FLWD CNTR: CPT

## 2025-06-11 PROCEDURE — 25510000001 IOPAMIDOL PER 1 ML: Performed by: NURSE PRACTITIONER

## 2025-06-11 PROCEDURE — 70450 CT HEAD/BRAIN W/O DYE: CPT

## 2025-06-11 RX ORDER — IOPAMIDOL 755 MG/ML
100 INJECTION, SOLUTION INTRAVASCULAR
Status: COMPLETED | OUTPATIENT
Start: 2025-06-11 | End: 2025-06-11

## 2025-06-11 RX ADMIN — IOPAMIDOL 75 ML: 755 INJECTION, SOLUTION INTRAVENOUS at 10:22

## 2025-06-17 ENCOUNTER — RESULTS FOLLOW-UP (OUTPATIENT)
Dept: FAMILY MEDICINE CLINIC | Facility: CLINIC | Age: 75
End: 2025-06-17
Payer: MEDICARE

## 2025-06-17 DIAGNOSIS — R91.1 NODULE OF LOWER LOBE OF LEFT LUNG: Primary | ICD-10-CM

## 2025-06-26 ENCOUNTER — HOSPITAL ENCOUNTER (OUTPATIENT)
Facility: HOSPITAL | Age: 75
Discharge: HOME | End: 2025-06-26
Payer: MEDICARE

## 2025-06-26 ENCOUNTER — HOSPITAL ENCOUNTER (OUTPATIENT)
Dept: CARDIOLOGY | Facility: HOSPITAL | Age: 75
Discharge: HOME | End: 2025-06-26
Payer: MEDICARE

## 2025-06-26 DIAGNOSIS — R55 SYNCOPE, UNSPECIFIED SYNCOPE TYPE: ICD-10-CM

## 2025-06-26 LAB
BH CV XLRA MEAS LEFT CAROTID BULB EDV: 8.6 CM/SEC
BH CV XLRA MEAS LEFT CAROTID BULB PSV: 36.4 CM/SEC
BH CV XLRA MEAS LEFT DIST CCA EDV: 15.9 CM/SEC
BH CV XLRA MEAS LEFT DIST CCA PSV: 71.2 CM/SEC
BH CV XLRA MEAS LEFT DIST ICA EDV: 28.5 CM/SEC
BH CV XLRA MEAS LEFT DIST ICA PSV: 77.5 CM/SEC
BH CV XLRA MEAS LEFT ICA/CCA RATIO: 0.6
BH CV XLRA MEAS LEFT MID ICA EDV: 20.9 CM/SEC
BH CV XLRA MEAS LEFT MID ICA PSV: 65.2 CM/SEC
BH CV XLRA MEAS LEFT PROX CCA EDV: 11.6 CM/SEC
BH CV XLRA MEAS LEFT PROX CCA PSV: 48.7 CM/SEC
BH CV XLRA MEAS LEFT PROX ECA EDV: 9.2 CM/SEC
BH CV XLRA MEAS LEFT PROX ECA PSV: 95.9 CM/SEC
BH CV XLRA MEAS LEFT PROX ICA EDV: 14.8 CM/SEC
BH CV XLRA MEAS LEFT PROX ICA PSV: 43.6 CM/SEC
BH CV XLRA MEAS LEFT VERTEBRAL A EDV: 16.7 CM/SEC
BH CV XLRA MEAS LEFT VERTEBRAL A PSV: 49.9 CM/SEC
BH CV XLRA MEAS RIGHT CAROTID BULB EDV: 23.8 CM/SEC
BH CV XLRA MEAS RIGHT CAROTID BULB PSV: 78.1 CM/SEC
BH CV XLRA MEAS RIGHT DIST CCA EDV: 11.9 CM/SEC
BH CV XLRA MEAS RIGHT DIST CCA PSV: 53.7 CM/SEC
BH CV XLRA MEAS RIGHT DIST ICA EDV: 25.3 CM/SEC
BH CV XLRA MEAS RIGHT DIST ICA PSV: 74 CM/SEC
BH CV XLRA MEAS RIGHT ICA/CCA RATIO: 1.5
BH CV XLRA MEAS RIGHT MID ICA EDV: 23.8 CM/SEC
BH CV XLRA MEAS RIGHT MID ICA PSV: 75.7 CM/SEC
BH CV XLRA MEAS RIGHT PROX CCA EDV: 12.2 CM/SEC
BH CV XLRA MEAS RIGHT PROX CCA PSV: 54 CM/SEC
BH CV XLRA MEAS RIGHT PROX ECA EDV: 7.5 CM/SEC
BH CV XLRA MEAS RIGHT PROX ECA PSV: 56.2 CM/SEC
BH CV XLRA MEAS RIGHT PROX ICA EDV: 21.3 CM/SEC
BH CV XLRA MEAS RIGHT PROX ICA PSV: 78.4 CM/SEC
BH CV XLRA MEAS RIGHT VERTEBRAL A EDV: 3.9 CM/SEC
BH CV XLRA MEAS RIGHT VERTEBRAL A PSV: 24.8 CM/SEC
LEFT ARM BP: NORMAL MMHG
RIGHT ARM BP: NORMAL MMHG

## 2025-06-26 PROCEDURE — 93225 XTRNL ECG REC<48 HRS REC: CPT

## 2025-06-26 PROCEDURE — 93306 TTE W/DOPPLER COMPLETE: CPT

## 2025-06-26 PROCEDURE — 93880 EXTRACRANIAL BILAT STUDY: CPT

## 2025-06-27 LAB
AORTIC DIMENSIONLESS INDEX: 0.64 (DI)
AV MEAN PRESS GRAD SYS DOP V1V2: 3 MMHG
BH CV ECHO MEAS - ACS: 1.8 CM
BH CV ECHO MEAS - AO ROOT DIAM: 3.2 CM
BH CV ECHO MEAS - AO V2 VTI: 28 CM
BH CV ECHO MEAS - AVA(I,D): 2.2 CM2
BH CV ECHO MEAS - EDV(CUBED): 77.3 ML
BH CV ECHO MEAS - EDV(MOD-SP4): 61.4 ML
BH CV ECHO MEAS - EF(MOD-SP4): 68.2 %
BH CV ECHO MEAS - ESV(CUBED): 23.4 ML
BH CV ECHO MEAS - ESV(MOD-SP4): 19.5 ML
BH CV ECHO MEAS - FS: 32.9 %
BH CV ECHO MEAS - IVS/LVPW: 1.1 CM
BH CV ECHO MEAS - IVSD: 0.95 CM
BH CV ECHO MEAS - LA DIMENSION: 2.7 CM
BH CV ECHO MEAS - LAT PEAK E' VEL: 7.7 CM/SEC
BH CV ECHO MEAS - LV DIASTOLIC VOL/BSA (35-75): 41.1 CM2
BH CV ECHO MEAS - LV MASS(C)D: 121.8 GRAMS
BH CV ECHO MEAS - LV MAX PG: 1.84 MMHG
BH CV ECHO MEAS - LV MEAN PG: 1 MMHG
BH CV ECHO MEAS - LV SYSTOLIC VOL/BSA (12-30): 13.1 CM2
BH CV ECHO MEAS - LV V1 MAX: 67.9 CM/SEC
BH CV ECHO MEAS - LV V1 VTI: 17.8 CM
BH CV ECHO MEAS - LVIDD: 4.3 CM
BH CV ECHO MEAS - LVIDS: 2.9 CM
BH CV ECHO MEAS - LVOT AREA: 3.5 CM2
BH CV ECHO MEAS - LVOT DIAM: 2.1 CM
BH CV ECHO MEAS - LVPWD: 0.86 CM
BH CV ECHO MEAS - MED PEAK E' VEL: 6.3 CM/SEC
BH CV ECHO MEAS - MV A MAX VEL: 75 CM/SEC
BH CV ECHO MEAS - MV DEC SLOPE: 250 CM/SEC2
BH CV ECHO MEAS - MV DEC TIME: 0.28 SEC
BH CV ECHO MEAS - MV E MAX VEL: 69.8 CM/SEC
BH CV ECHO MEAS - MV E/A: 0.93
BH CV ECHO MEAS - PA V2 MAX: 73.4 CM/SEC
BH CV ECHO MEAS - SV(LVOT): 61.7 ML
BH CV ECHO MEAS - SV(MOD-SP4): 41.9 ML
BH CV ECHO MEAS - SVI(LVOT): 41.3 ML/M2
BH CV ECHO MEAS - SVI(MOD-SP4): 28 ML/M2
BH CV ECHO MEAS - TAPSE (>1.6): 1.73 CM
BH CV ECHO MEAS - TR MAX PG: 19.4 MMHG
BH CV ECHO MEAS - TR MAX VEL: 220 CM/SEC
BH CV ECHO MEASUREMENTS AVERAGE E/E' RATIO: 9.97
BH CV XLRA - TDI S': 9 CM/SEC
IVRT: 85 MS

## 2025-06-30 ENCOUNTER — LAB (OUTPATIENT)
Dept: LAB | Facility: HOSPITAL | Age: 75
End: 2025-06-30
Payer: MEDICARE

## 2025-06-30 DIAGNOSIS — E03.9 HYPOTHYROIDISM (ACQUIRED): ICD-10-CM

## 2025-06-30 LAB
T3FREE SERPL-MCNC: 3.51 PG/ML (ref 2–4.4)
T4 FREE SERPL-MCNC: 1.17 NG/DL (ref 0.92–1.68)
TSH SERPL DL<=0.05 MIU/L-ACNC: 0.8 UIU/ML (ref 0.27–4.2)

## 2025-06-30 PROCEDURE — 36415 COLL VENOUS BLD VENIPUNCTURE: CPT

## 2025-06-30 PROCEDURE — 84443 ASSAY THYROID STIM HORMONE: CPT

## 2025-06-30 PROCEDURE — 84481 FREE ASSAY (FT-3): CPT

## 2025-06-30 PROCEDURE — 84439 ASSAY OF FREE THYROXINE: CPT

## 2025-07-01 ENCOUNTER — TELEPHONE (OUTPATIENT)
Dept: CARDIOLOGY | Facility: CLINIC | Age: 75
End: 2025-07-01
Payer: MEDICARE

## 2025-07-01 NOTE — TELEPHONE ENCOUNTER
Caller: Samira Kramer    Relationship: Self    Best call back number:   Telephone Information:   Mobile [phone]       What is the best time to reach you: THURSDAY ANY TIME    Who are you requesting to speak with (clinical staff, provider,  specific staff member): ANY    What was the call regarding: PATIENT HAD TESTING DONE AND GOT A CALL FOR A HEART SPECIALIST THEN GOT A CALL TO SCHEDULE WITH A VASCULAR DOCTOR WITH [facility].  SHE WOULD LIKE TO KNOW IF DR. CHILDRESS CAN DO THIS OR EXPLAIN WHY THESE RECOMMENDATIONS WERE MADE

## 2025-07-02 LAB
CV ZIO BASELINE AVG BPM: 73
CV ZIO BASELINE BPM HIGH: 147
CV ZIO BASELINE BPM LOW: 52

## 2025-07-02 NOTE — TELEPHONE ENCOUNTER
SW patient after viewing carotid ultrasound. Advised Dr Canales does not do anything with the carotids. Advised it would be vascular surgery. Patient voiced understanding

## 2025-07-09 ENCOUNTER — OFFICE VISIT (OUTPATIENT)
Dept: OTOLARYNGOLOGY | Facility: CLINIC | Age: 75
End: 2025-07-09
Payer: MEDICARE

## 2025-07-09 VITALS
DIASTOLIC BLOOD PRESSURE: 74 MMHG | TEMPERATURE: 98 F | WEIGHT: 109 LBS | HEART RATE: 74 BPM | BODY MASS INDEX: 20.06 KG/M2 | SYSTOLIC BLOOD PRESSURE: 126 MMHG | HEIGHT: 62 IN

## 2025-07-09 DIAGNOSIS — E03.9 HYPOTHYROIDISM (ACQUIRED): ICD-10-CM

## 2025-07-09 DIAGNOSIS — H61.303 EXTERNAL EAR CANAL STENOSIS, ACQUIRED, BILATERAL: ICD-10-CM

## 2025-07-09 DIAGNOSIS — K11.8 PAROTID MASS: ICD-10-CM

## 2025-07-09 DIAGNOSIS — H61.23 HEARING LOSS DUE TO CERUMEN IMPACTION, BILATERAL: Primary | ICD-10-CM

## 2025-07-09 DIAGNOSIS — E03.9 ACQUIRED HYPOTHYROIDISM: ICD-10-CM

## 2025-07-09 RX ORDER — LEVOTHYROXINE SODIUM 50 UG/1
50 TABLET ORAL
Qty: 90 TABLET | Refills: 3 | Status: SHIPPED | OUTPATIENT
Start: 2025-07-09

## 2025-07-09 RX ORDER — LIOTHYRONINE SODIUM 5 UG/1
5 TABLET ORAL DAILY
Qty: 90 TABLET | Refills: 3 | Status: SHIPPED | OUTPATIENT
Start: 2025-07-09

## 2025-07-09 NOTE — PROGRESS NOTES
Patient Name: Samira Kramer   Visit Date: 07/09/2025   Patient ID: [id number]  Provider: Vlad Rodriguez MD    Sex: female  [place] [facility]   YOB: 1950  Location Address: [address] [place] [address], [address] [place] [address],?[place]?[id number]    Primary Care Provider Jason Dunlap, MICHELLE  Location Phone: [phone]    Referring Provider: No ref. provider found        Chief Complaint  3 month follow up with labs, Hypothyroidism, parotid mass, Hearing Loss, and Cerumen Impaction    History of Present Illness  Samira Kramer is a 74 y.o. female who presents to [facility] for 3 month follow up with labs, Hypothyroidism, parotid mass, Hearing Loss, and Cerumen Impaction.     Patient had a screening CT of chest performed on 12/21/2023 showing 9 mm pleural-based nodule.  Subsequently she underwent PET/CT on 1/22/2024 showing 1.2 cm x 0.8 cm left parotid soft tissue mass and also a 0.6 cm x 0.5 cm mass that is well-circumscribed in the right parotid gland as well.  There were both hypermetabolic and suspected that it might represent Warthin's tumor.  9 mm pleural-based nodule in the right medial right lower lobe was less nodular and was not hypermetabolic.  Follow-up CT of chest on 4/1/2024 showed millimeter pleural-based right lower lobe nodule has decreased in size to 7 mm consistent with benign pathology.  However CT of facial bones showed bilateral parotid masses there is 7 mm and 1.1 cm and persistent suspecting Warthin's tumor or possibly intraparotid nodes.  Patient had bilateral extensive cerumen impactions cleaned out of both ears otherwise bilateral TMs are unremarkable.  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  I would suggest the patient get her ears cleaned maybe once every 6 months.  Patient has bilateral parotid masses but unfortunately none of  them are palpable despite the fact that they showed up on the CT scan.  I would recommend conservative follow-up and I will examine again in 6 months.  When it is definitely palpable that is when I would recommend either biopsy or surgical intervention.  I will see patient in 6 months for follow-up.  Patient also concerned about her hair loss and basically she was diagnosed with hypothyroidism where she was started on levothyroxine 25 mcg p.o. daily.  6 months ago patient had that doubled to 50 mcg p.o. daily and recent lab on March 26 she only had TSH but that was current normal at middle portion at 1.98.  Other parameters were not done.    Patient has cerumen impaction both ears cleaned under microscope.  Otherwise unremarkable.  Patient had history of bilateral parotid masses which was not palpable and still again after 6 months follow-up still not palpable.  I gave patient options and she would like to wait on any further workup.  Therefore I will probably check on her and a year from now.  Patient is being treated with levothyroxine 50 mcg p.o. daily for hypothyroidism still complaining about hair loss.  I will repeat the labs today with all the parameters.  Past Medical History:   Diagnosis Date    Gastro-esophageal reflux disease without esophagitis     Humerus fracture     RIGHT PROXIMAL    Hyperlipidemia, unspecified     Hypothyroidism, unspecified     Kidney stone     NO CURRENT ISSUES    Macular degeneration 2021    Smoker     Vitamin D deficiency, unspecified        Past Surgical History:   Procedure Laterality Date    APPENDECTOMY      COLONOSCOPY  07/2017, 12/2018    per constantin Dobson, due in 2027    ORIF HUMERUS FRACTURE Right 4/18/2023    Procedure: HUMERUS PROXIMAL OPEN REDUCTION INTERNAL FIXATION;  Surgeon: Keshawn Arellano MD;  Location: [facility];  Service: Orthopedics;  Laterality: Right;    URETEROSCOPY  02/2018    SCOPE-BLADDER AND KIDNEYS         Current Outpatient Medications:      Ascorbic Acid (Vitamin C) 500 MG/5ML liquid, Daily. LAST DOSE 4/13/23, Disp: , Rfl:     Calcium Polycarbophil (FIBER-CAPS PO), Take  by mouth. LAST DOSE 4/13/23, Disp: , Rfl:     cholecalciferol (VITAMIN D3) 25 MCG (1000 UT) tablet, Take 1 tablet by mouth Daily., Disp: , Rfl:     levothyroxine (Synthroid) 50 MCG tablet, Take 1 tablet by mouth Every Morning., Disp: 90 tablet, Rfl: 3    liothyronine (Cytomel) 5 MCG tablet, Take 1 tablet by mouth Daily., Disp: 90 tablet, Rfl: 3    Multiple Vitamins-Minerals (Hair Skin & Nails) tablet, Take 1 tablet by mouth Daily., Disp: , Rfl:     multivitamin with minerals tablet tablet, Take 2 tablets by mouth Daily. For eyes/macular degeneration LAST DOSE 4/13/23, Disp: , Rfl:     multivitamins-minerals (PRESERVISION AREDS 2) capsule capsule, Take 1 capsule by mouth 2 (Two) Times a Day., Disp: , Rfl:     pantoprazole (Protonix) 40 MG EC tablet, Take 1 tablet by mouth Daily., Disp: 30 tablet, Rfl: 3    rosuvastatin (CRESTOR) 5 MG tablet, Take 1 tablet by mouth Daily., Disp: 90 tablet, Rfl: 0    traZODone (DESYREL) 50 MG tablet, Take 2 tablets by mouth Every Night., Disp: 180 tablet, Rfl: 1    Vitamin A 2400 MCG (8000 UT) tablet, Take 1 tablet by mouth Daily., Disp: , Rfl:     vitamin B-12 (CYANOCOBALAMIN) 100 MCG tablet, LAST DOSE 4/13/23, Disp: , Rfl:     Zinc 10 MG lozenge, Take 50 mg by mouth 2 (Two) Times a Day. LAST DOSE 4/13/23, Disp: , Rfl:      Allergies   Allergen Reactions    Fenofibrate Hives    Fosamax [Alendronate] Other (See Comments)     Chest pain    Aleve [Naproxen] GI Intolerance    Atorvastatin Myalgia    Nsaids GI Intolerance    Omega 3-6-9 Fatty Acids Diarrhea    Penicillins Hives    Sulfa Antibiotics Rash    Zetia [Ezetimibe] Rash       Social History     Tobacco Use    Smoking status: Every Day     Current packs/day: 1.00     Average packs/day: 1 pack/day for 57.5 years (57.5 ttl pk-yrs)     Types: Cigarettes     Start date: 1968     Passive exposure: Never     "Smokeless tobacco: Never    Tobacco comments:     INST PER ANESTHESIA PROTOCOL, smoked last yesterday   Vaping Use    Vaping status: Never Used   Substance Use Topics    Alcohol use: Never    Drug use: Never        Objective     Vital Signs:   Vitals:    07/09/25 0919   BP: 126/74   Pulse: 74   Temp: 98 °F (36.7 °C)   Weight: 49.4 kg (109 lb)   Height: 157.5 cm (62\")       Tobacco Use: High Risk (7/9/2025)    Patient History     Smoking Tobacco Use: Every Day     Smokeless Tobacco Use: Never     Passive Exposure: Never         Physical Exam    Constitutional   Appearance  well developed, well-nourished, alert and in no acute distress, voice clear and strong    Head   Inspection  no deformities or lesions      Face   Inspection  no facial lesions; House-Brackmann I/VI bilaterally   Palpation  no TMJ crepitus nor  muscle tenderness bilaterally     Eyes/Vision   Visual Fields  extraocular movements are intact, no spontaneous or gaze-induced nystagmus  Conjunctivae  clear   Sclerae  clear   Pupils and Irises  pupils equal, round, and reactive to light.   Nystagmus  not present     Ears, Nose, Mouth and Throat  Ears  External Ears  appearance within normal limits, no lesions present   Otoscopic Examination  tympanic membrane appearance within normal limits bilaterally without perforations, well-aerated middle ears   Hearing  intact to conversational voice both ears   Tunning fork testing    Rinne:  Loyd:    Nose  External Nose  appearance normal   Intranasal Exam  mucosa within normal limits, vestibules normal, no intranasal lesions present, septum midline, sinuses non tender to percussion   Modified Roberts Test:    Oral Cavity  Oral Mucosa  oral mucosa normal without pallor or cyanosis   Lips  lip appearance normal   Teeth  normal dentition for age   Gums  gums pink, non-swollen, no bleeding present   Tongue  tongue appearance normal; normal mobility   Palate  hard palate normal, soft palate appearance normal " "with symmetric mobility     Throat  Oropharynx  no inflammation or lesions present, tonsils within normal limits   Hypopharynx  appearance within normal limits   Larynx  voice normal     Neck  Inspection/Palpation  normal appearance, no masses or tenderness, trachea midline; thyroid size normal, nontender, no nodules or masses present on palpation     Lymphatic  Neck  no lymphadenopathy present   Supraclavicular Nodes  no lymphadenopathy present   Preauricular Nodes  no lymphadenopathy present     Respiratory  Respiratory Effort  breathing unlabored   Inspection of Chest  normal appearance, no retractions     Musculoskeletal   Cervical back: Normal range of motion and neck supple.      Skin and Subcutaneous Tissue  General Inspection  Regarding face and neck - there are no rashes present, no lesions present, and no areas of discoloration     Neurologic  Cranial Nerves  cranial nerves II-XII are grossly intact bilaterally   Gait and Station  normal gait, able to stand without diffculty    Psychiatric  Judgement and Insight  judgment and insight intact   Mood and Affect  mood normal, affect appropriate       RESULTS REVIEWED    I have reviewed the following information:   [x]  Previous Internal Note  []  Previous External Note:   [x]  Ordered Tests & Results:      Pathology: No results found for: \"MICRO\"    TSH   Date Value Ref Range Status   06/30/2025 0.803 0.270 - 4.200 uIU/mL Final     T3, Free   Date Value Ref Range Status   06/30/2025 3.51 2.00 - 4.40 pg/mL Final     Free T4   Date Value Ref Range Status   06/30/2025 1.17 0.92 - 1.68 ng/dL Final     Calcium   Date Value Ref Range Status   03/26/2025 9.4 8.6 - 10.5 mg/dL Final     25 Hydroxy, Vitamin D   Date Value Ref Range Status   07/11/2023 55.9 30.0 - 100.0 ng/ml Final       CT Abdomen Pelvis With & Without Contrast  Result Date: 6/16/2025  Impression: 1.Compared with chest CT 4/15/2025, the large left renal pelvis stone seen previously is not seen on " today's exam. 2.Bilateral nephrolithiasis measuring up to 0.7 cm lower pole the right collecting system and 0.9 cm lower pole left renal collecting system. 3.No definite hydronephrosis. No definite ureteral stone. Pelvic calcifications are favored to reflect phleboliths. 4.Small 0.7 cm nodular density in the left lower lobe of the lung. This was not seen on recent chest CT 4/15/2025. Consider short-term follow-up chest CT in 3 to 4 months. 5.Moderate atherosclerosis. 6.Other findings as above. Electronically Signed: Justen Pham MD  6/16/2025 5:08 PM EDT  Workstation ID: [id number]    CT Head Without Contrast  Result Date: 6/11/2025  Impression: No acute intracranial abnormality. Electronically Signed: Al Montana MD  6/11/2025 12:39 PM EDT  Workstation ID: [id number]    CT Chest Low Dose Cancer Screening WO  Result Date: 4/17/2025  Impression: 1. No suspicious pulmonary nodules identified. Recommend a low-dose screening CT scan of the chest in 1 year. 2. 1.2 cm partially visualized calcification in the left renal pelvis. Suggest a follow-up urology protocol CT scan of the abdomen and pelvis with and without intravenous contrast. Lung RADS 2S Electronically Signed: Bob Martinez MD  4/17/2025 10:35 AM EDT  Workstation ID: [id number]      I have discussed the interpretation of the above results with the patient.    Ear Cerumen Removal    Date/Time: 7/9/2025 9:59 AM    Performed by: Vlad Rodriguez MD  Authorized by: Vlad Rodriguez MD  Location details: left ear and right ear  Patient tolerance: patient tolerated the procedure well with no immediate complications  Comments: Under the microscope both ear canal cerumen impactions were removed uneventfully using alligator forceps.  Otherwise both TMs are unremarkable.  Procedure type: instrumentation, alligator forceps   Sedation:  Patient sedated: no                  Assessment and Plan   Diagnoses and all orders for this visit:    1. Hearing loss due to cerumen  impaction, bilateral (Primary)  -     Ear Cerumen Removal    2. Parotid mass    3. Hypothyroidism (acquired)  -     T3, Free; Future  -     TSH; Future  -     T4, free; Future  -     liothyronine (Cytomel) 5 MCG tablet; Take 1 tablet by mouth Daily.  Dispense: 90 tablet; Refill: 3  -     levothyroxine (Synthroid) 50 MCG tablet; Take 1 tablet by mouth Every Morning.  Dispense: 90 tablet; Refill: 3    4. External ear canal stenosis, acquired, bilateral  -     Ear Cerumen Removal    5. Acquired hypothyroidism        (H61.23) Hearing loss due to cerumen impaction, bilateral - Plan: Ear Cerumen Removal    (K11.8) Parotid mass    (E03.9) Hypothyroidism (acquired) - Plan: T3, Free, TSH, T4, free, liothyronine (Cytomel) 5 MCG tablet, levothyroxine (Synthroid) 50 MCG tablet    (H61.303) External ear canal stenosis, acquired, bilateral - Plan: Ear Cerumen Removal    (E03.9) Acquired hypothyroidism     Samira Kramer  reports that she has been smoking cigarettes. She started smoking about 57 years ago. She has a 57.5 pack-year smoking history. She has never been exposed to tobacco smoke. She has never used smokeless tobacco.         Plan:  Patient Instructions   1.  Patient had hypothyroidism being treated with levothyroxine 50 mcg p.o. daily.  However on lab following last visit she was given additional Cytomel 5 mcg p.o. daily.  She returns today feeling great and her hair is coming back.  Her thyroid function test repeated is looking excellent.  Therefore we will continue levothyroxine 50 mcg p.o. daily along with liothyronine 5 mcg p.o. daily.  2.  Patient also had bilateral parotid masses but again is not able to be palpated on today's examination.  3.  Patient had bilateral cerumen impaction which was cleaned under microscope.  4.  I will see patient in 6 months for ear cleaning as well as to follow-up with thyroid function test.        Follow Up   Return in about 6 months (around 1/9/2026), or Follow-up 6 months  for ear cleaning and thyroid labs.  Patient already has appointment in April.  Patient was given instructions and counseling regarding her condition or for health maintenance advice. Please see specific information pulled into the AVS if appropriate.

## 2025-07-09 NOTE — PATIENT INSTRUCTIONS
1.  Patient had hypothyroidism being treated with levothyroxine 50 mcg p.o. daily.  However on lab following last visit she was given additional Cytomel 5 mcg p.o. daily.  She returns today feeling great and her hair is coming back.  Her thyroid function test repeated is looking excellent.  Therefore we will continue levothyroxine 50 mcg p.o. daily along with liothyronine 5 mcg p.o. daily.  2.  Patient also had bilateral parotid masses but again is not able to be palpated on today's examination.  3.  Patient had bilateral cerumen impaction which was cleaned under microscope.  4.  I will see patient in 6 months for ear cleaning as well as to follow-up with thyroid function test.

## 2025-07-24 RX ORDER — ROSUVASTATIN CALCIUM 5 MG/1
5 TABLET, COATED ORAL DAILY
Qty: 90 TABLET | Refills: 0 | Status: SHIPPED | OUTPATIENT
Start: 2025-07-24

## 2025-08-19 ENCOUNTER — OFFICE VISIT (OUTPATIENT)
Age: 75
End: 2025-08-19
Payer: MEDICARE

## 2025-08-19 VITALS
OXYGEN SATURATION: 100 % | HEART RATE: 67 BPM | SYSTOLIC BLOOD PRESSURE: 149 MMHG | RESPIRATION RATE: 18 BRPM | TEMPERATURE: 97.5 F | DIASTOLIC BLOOD PRESSURE: 71 MMHG

## 2025-08-19 DIAGNOSIS — I65.23 CAROTID STENOSIS, ASYMPTOMATIC, BILATERAL: Primary | ICD-10-CM

## 2025-08-22 ENCOUNTER — PATIENT ROUNDING (BHMG ONLY) (OUTPATIENT)
Age: 75
End: 2025-08-22
Payer: MEDICARE

## (undated) DEVICE — KWIRE SS 2.0MM NS
Type: IMPLANTABLE DEVICE | Site: HUMERUS | Status: NON-FUNCTIONAL
Removed: 2023-04-18

## (undated) DEVICE — PROXIMATE RH ROTATING HEAD SKIN STAPLERS (35 WIDE) CONTAINS 35 STAINLESS STEEL STAPLES: Brand: PROXIMATE

## (undated) DEVICE — EXTREMITY-LF: Brand: MEDLINE INDUSTRIES, INC.

## (undated) DEVICE — APPL CHLORAPREP HI/LITE 26ML ORNG

## (undated) DEVICE — STERILE POLYISOPRENE POWDER-FREE SURGICAL GLOVES: Brand: PROTEXIS

## (undated) DEVICE — GAUZE,SPONGE,4"X4",16PLY,STRL,LF,10/TRAY: Brand: MEDLINE

## (undated) DEVICE — PENCL E/S SMOKEEVAC W/TELESCP CANN

## (undated) DEVICE — Device

## (undated) DEVICE — GLV SURG ULTRAFREE MAX LTX PF 8

## (undated) DEVICE — BNDG ESMARK 4IN 12FT LF STRL BLU

## (undated) DEVICE — SOL IRR NACL 0.9PCT BT 1000ML

## (undated) DEVICE — UNDYED BRAIDED (POLYGLACTIN 910), SYNTHETIC ABSORBABLE SUTURE: Brand: COATED VICRYL

## (undated) DEVICE — GOWN,REINFORCE,POLY,SIRUS,BREATH SLV,XLG: Brand: MEDLINE

## (undated) DEVICE — DRSNG PAD ABD 8X10IN STRL

## (undated) DEVICE — SUT POLY FORCEFIBER W/CUT/NDL NO5 38IN

## (undated) DEVICE — COMFORT ARM SLING: Brand: DEROYAL

## (undated) DEVICE — DRSNG WND AQUACEL AGEXTRA HYDROFIBER RECTG 4X5IN

## (undated) DEVICE — GLV SURG SENSICARE SLT PF LF 7 STRL

## (undated) DEVICE — BIPOLAR SEALER 23-112-1 AQM 6.0: Brand: AQUAMANTYS™

## (undated) DEVICE — DRSNG GZ PETROLTM XEROFORM CURAD 1X8IN STRL

## (undated) DEVICE — TOWEL,OR,DSP,ST,BLUE,STD,4/PK,20PK/CS: Brand: MEDLINE

## (undated) DEVICE — SUT VIC UD BR COAT 0 CP2 27IN

## (undated) DEVICE — SUT VIC PLS CTD BR 0 TIE 18IN VIL

## (undated) DEVICE — DRP SURG U/DRP INVISISHIELD PA 48X52IN

## (undated) DEVICE — GLV SURG SENSICARE W/ALOE PF LF 7 STRL

## (undated) DEVICE — PAD GRND REM POLYHESIVE A/ DISP

## (undated) DEVICE — 3M™ STERI-DRAPE™ X-RAY IMAGE INTENSIFIER DRAPE, 10 PER CARTON / 4 CARTONS PER CASE, 1013: Brand: STERI-DRAPE™

## (undated) DEVICE — 3M™ IOBAN™ 2 ANTIMICROBIAL INCISE DRAPE 6650EZ: Brand: IOBAN™ 2